# Patient Record
Sex: MALE | Race: WHITE | Employment: OTHER
[De-identification: names, ages, dates, MRNs, and addresses within clinical notes are randomized per-mention and may not be internally consistent; named-entity substitution may affect disease eponyms.]

---

## 2017-01-03 DIAGNOSIS — E13.9 DIABETES 1.5, MANAGED AS TYPE 2 (HCC): Primary | ICD-10-CM

## 2017-01-03 RX ORDER — PEN NEEDLE, DIABETIC 31 GX5/16"
NEEDLE, DISPOSABLE MISCELLANEOUS
Qty: 100 EACH | Refills: 6 | Status: SHIPPED | OUTPATIENT
Start: 2017-01-03 | End: 2017-05-15 | Stop reason: SDUPTHER

## 2017-01-23 PROBLEM — C44.319 BASAL CELL CARCINOMA OF CHEEK: Status: ACTIVE | Noted: 2017-01-23

## 2017-01-23 PROBLEM — D48.5 NEOPLASM OF UNCERTAIN BEHAVIOR OF SKIN: Status: ACTIVE | Noted: 2017-01-23

## 2017-01-24 ENCOUNTER — OFFICE VISIT (OUTPATIENT)
Dept: UROLOGY | Facility: CLINIC | Age: 77
End: 2017-01-24

## 2017-01-24 VITALS
BODY MASS INDEX: 31.36 KG/M2 | DIASTOLIC BLOOD PRESSURE: 50 MMHG | TEMPERATURE: 97.8 F | HEART RATE: 92 BPM | HEIGHT: 66 IN | WEIGHT: 195.11 LBS | SYSTOLIC BLOOD PRESSURE: 83 MMHG

## 2017-01-24 DIAGNOSIS — C67.9 MALIGNANT NEOPLASM OF URINARY BLADDER, UNSPECIFIED SITE (HCC): ICD-10-CM

## 2017-01-24 DIAGNOSIS — N13.8 BPH WITH OBSTRUCTION/LOWER URINARY TRACT SYMPTOMS: ICD-10-CM

## 2017-01-24 DIAGNOSIS — R33.9 INCOMPLETE EMPTYING OF BLADDER: Primary | ICD-10-CM

## 2017-01-24 DIAGNOSIS — N40.1 BPH WITH OBSTRUCTION/LOWER URINARY TRACT SYMPTOMS: ICD-10-CM

## 2017-01-24 PROCEDURE — 99214 OFFICE O/P EST MOD 30 MIN: CPT | Performed by: UROLOGY

## 2017-01-24 PROCEDURE — 1036F TOBACCO NON-USER: CPT | Performed by: UROLOGY

## 2017-01-24 PROCEDURE — G8427 DOCREV CUR MEDS BY ELIG CLIN: HCPCS | Performed by: UROLOGY

## 2017-01-24 PROCEDURE — G8484 FLU IMMUNIZE NO ADMIN: HCPCS | Performed by: UROLOGY

## 2017-01-24 PROCEDURE — 51798 US URINE CAPACITY MEASURE: CPT | Performed by: UROLOGY

## 2017-01-24 PROCEDURE — 4040F PNEUMOC VAC/ADMIN/RCVD: CPT | Performed by: UROLOGY

## 2017-01-24 PROCEDURE — G8419 CALC BMI OUT NRM PARAM NOF/U: HCPCS | Performed by: UROLOGY

## 2017-01-24 PROCEDURE — 1123F ACP DISCUSS/DSCN MKR DOCD: CPT | Performed by: UROLOGY

## 2017-01-24 ASSESSMENT — ENCOUNTER SYMPTOMS
EYE PAIN: 0
WHEEZING: 1
BACK PAIN: 1
COUGH: 1
SHORTNESS OF BREATH: 1
VOMITING: 0
COLOR CHANGE: 0
ABDOMINAL PAIN: 1
EYE REDNESS: 0
NAUSEA: 1

## 2017-02-06 ENCOUNTER — HOSPITAL ENCOUNTER (OUTPATIENT)
Age: 77
Discharge: HOME | End: 2017-02-06
Payer: MEDICARE

## 2017-02-06 ENCOUNTER — OFFICE VISIT (OUTPATIENT)
Dept: FAMILY MEDICINE CLINIC | Facility: CLINIC | Age: 77
End: 2017-02-06

## 2017-02-06 VITALS
OXYGEN SATURATION: 82 % | DIASTOLIC BLOOD PRESSURE: 78 MMHG | WEIGHT: 197.8 LBS | SYSTOLIC BLOOD PRESSURE: 113 MMHG | BODY MASS INDEX: 31.79 KG/M2 | HEIGHT: 66 IN | TEMPERATURE: 97.9 F | HEART RATE: 97 BPM

## 2017-02-06 DIAGNOSIS — E13.9 DIABETES 1.5, MANAGED AS TYPE 2 (HCC): ICD-10-CM

## 2017-02-06 DIAGNOSIS — R13.12 OROPHARYNGEAL DYSPHAGIA: Primary | ICD-10-CM

## 2017-02-06 DIAGNOSIS — J41.8 MIXED SIMPLE AND MUCOPURULENT CHRONIC BRONCHITIS (HCC): ICD-10-CM

## 2017-02-06 PROCEDURE — G8484 FLU IMMUNIZE NO ADMIN: HCPCS | Performed by: FAMILY MEDICINE

## 2017-02-06 PROCEDURE — 1036F TOBACCO NON-USER: CPT | Performed by: FAMILY MEDICINE

## 2017-02-06 PROCEDURE — G8926 SPIRO NO PERF OR DOC: HCPCS | Performed by: FAMILY MEDICINE

## 2017-02-06 PROCEDURE — 99213 OFFICE O/P EST LOW 20 MIN: CPT | Performed by: FAMILY MEDICINE

## 2017-02-06 PROCEDURE — 1123F ACP DISCUSS/DSCN MKR DOCD: CPT | Performed by: FAMILY MEDICINE

## 2017-02-06 PROCEDURE — 4040F PNEUMOC VAC/ADMIN/RCVD: CPT | Performed by: FAMILY MEDICINE

## 2017-02-06 PROCEDURE — G8427 DOCREV CUR MEDS BY ELIG CLIN: HCPCS | Performed by: FAMILY MEDICINE

## 2017-02-06 PROCEDURE — G8419 CALC BMI OUT NRM PARAM NOF/U: HCPCS | Performed by: FAMILY MEDICINE

## 2017-02-06 PROCEDURE — 3023F SPIROM DOC REV: CPT | Performed by: FAMILY MEDICINE

## 2017-02-06 ASSESSMENT — ENCOUNTER SYMPTOMS
VOMITING: 0
SORE THROAT: 0
ABDOMINAL PAIN: 0
CHEST TIGHTNESS: 0
COUGH: 1
BLOOD IN STOOL: 0
CHANGE IN BOWEL HABIT: 0
NAUSEA: 0
SHORTNESS OF BREATH: 0

## 2017-02-13 ENCOUNTER — HOSPITAL ENCOUNTER (OUTPATIENT)
Dept: GENERAL RADIOLOGY | Age: 77
Discharge: HOME | End: 2017-02-13
Payer: MEDICARE

## 2017-02-13 DIAGNOSIS — R13.12 OROPHARYNGEAL DYSPHAGIA: ICD-10-CM

## 2017-02-13 PROCEDURE — G8996 SWALLOW CURRENT STATUS: HCPCS

## 2017-02-13 PROCEDURE — 74230 X-RAY XM SWLNG FUNCJ C+: CPT | Performed by: RADIOLOGY

## 2017-02-13 PROCEDURE — G9500 RAD EXPOS IND/EXP TM DOC: HCPCS | Performed by: RADIOLOGY

## 2017-02-13 PROCEDURE — 74230 X-RAY XM SWLNG FUNCJ C+: CPT

## 2017-02-13 PROCEDURE — G8998 SWALLOW D/C STATUS: HCPCS

## 2017-02-14 ENCOUNTER — TELEPHONE (OUTPATIENT)
Dept: FAMILY MEDICINE CLINIC | Facility: CLINIC | Age: 77
End: 2017-02-14

## 2017-02-14 DIAGNOSIS — E13.9 DIABETES 1.5, MANAGED AS TYPE 2 (HCC): ICD-10-CM

## 2017-02-14 RX ORDER — INSULIN GLARGINE 100 [IU]/ML
22 INJECTION, SOLUTION SUBCUTANEOUS 2 TIMES DAILY
Qty: 5 PEN | Refills: 5 | Status: SHIPPED | OUTPATIENT
Start: 2017-02-14 | End: 2017-05-15

## 2017-03-03 ENCOUNTER — PROCEDURE VISIT (OUTPATIENT)
Dept: UROLOGY | Facility: CLINIC | Age: 77
End: 2017-03-03

## 2017-03-03 ENCOUNTER — HOSPITAL ENCOUNTER (OUTPATIENT)
Age: 77
Setting detail: SPECIMEN
Discharge: HOME | End: 2017-03-03
Payer: MEDICARE

## 2017-03-03 VITALS
WEIGHT: 197.75 LBS | BODY MASS INDEX: 31.78 KG/M2 | DIASTOLIC BLOOD PRESSURE: 42 MMHG | TEMPERATURE: 97.6 F | HEIGHT: 66 IN | HEART RATE: 65 BPM | RESPIRATION RATE: 16 BRPM | SYSTOLIC BLOOD PRESSURE: 99 MMHG

## 2017-03-03 DIAGNOSIS — N13.8 BPH WITH OBSTRUCTION/LOWER URINARY TRACT SYMPTOMS: ICD-10-CM

## 2017-03-03 DIAGNOSIS — C67.9 MALIGNANT NEOPLASM OF URINARY BLADDER, UNSPECIFIED SITE (HCC): Primary | ICD-10-CM

## 2017-03-03 DIAGNOSIS — N40.1 BPH WITH OBSTRUCTION/LOWER URINARY TRACT SYMPTOMS: ICD-10-CM

## 2017-03-03 PROCEDURE — 52000 CYSTOURETHROSCOPY: CPT | Performed by: UROLOGY

## 2017-03-03 PROCEDURE — 1036F TOBACCO NON-USER: CPT | Performed by: UROLOGY

## 2017-03-03 RX ORDER — FINASTERIDE 5 MG/1
5 TABLET, FILM COATED ORAL DAILY
Qty: 90 TABLET | Refills: 3 | Status: SHIPPED | OUTPATIENT
Start: 2017-03-03 | End: 2017-03-03 | Stop reason: CLARIF

## 2017-03-03 RX ORDER — FINASTERIDE 5 MG/1
5 TABLET, FILM COATED ORAL DAILY
Qty: 90 TABLET | Refills: 3 | Status: SHIPPED | OUTPATIENT
Start: 2017-03-03 | End: 2018-02-21 | Stop reason: SDUPTHER

## 2017-03-06 LAB
CASE NUMBER:: NORMAL
SPECIMEN DESCRIPTION: NORMAL
SURGICAL PATHOLOGY REPORT: NORMAL

## 2017-03-09 ENCOUNTER — TELEPHONE (OUTPATIENT)
Dept: UROLOGY | Facility: CLINIC | Age: 77
End: 2017-03-09

## 2017-03-24 ENCOUNTER — HOSPITAL ENCOUNTER (OUTPATIENT)
Dept: PREADMISSION TESTING | Age: 77
Discharge: HOME | End: 2017-03-24
Payer: MEDICARE

## 2017-03-24 ENCOUNTER — HOSPITAL ENCOUNTER (OUTPATIENT)
Dept: GENERAL RADIOLOGY | Age: 77
Discharge: HOME | End: 2017-03-24
Payer: MEDICARE

## 2017-03-24 VITALS
WEIGHT: 198 LBS | DIASTOLIC BLOOD PRESSURE: 63 MMHG | SYSTOLIC BLOOD PRESSURE: 106 MMHG | BODY MASS INDEX: 31.82 KG/M2 | RESPIRATION RATE: 16 BRPM | OXYGEN SATURATION: 90 % | HEIGHT: 66 IN | HEART RATE: 52 BPM | TEMPERATURE: 98 F

## 2017-03-24 LAB
ABSOLUTE EOS #: 0.1 K/UL (ref 0–0.4)
ABSOLUTE LYMPH #: 1.3 K/UL (ref 1–4.8)
ABSOLUTE MONO #: 0.5 K/UL (ref 0.1–1.3)
ANION GAP SERPL CALCULATED.3IONS-SCNC: 15 MMOL/L (ref 9–17)
BASOPHILS # BLD: 1 % (ref 0–2)
BASOPHILS ABSOLUTE: 0 K/UL (ref 0–0.2)
BUN BLDV-MCNC: 24 MG/DL (ref 8–23)
BUN/CREAT BLD: ABNORMAL (ref 9–20)
CALCIUM SERPL-MCNC: 9.7 MG/DL (ref 8.6–10.4)
CHLORIDE BLD-SCNC: 99 MMOL/L (ref 98–107)
CO2: 24 MMOL/L (ref 20–31)
CREAT SERPL-MCNC: 1.04 MG/DL (ref 0.7–1.2)
DIFFERENTIAL TYPE: ABNORMAL
EOSINOPHILS RELATIVE PERCENT: 2 % (ref 0–4)
GFR AFRICAN AMERICAN: >60 ML/MIN
GFR NON-AFRICAN AMERICAN: >60 ML/MIN
GFR SERPL CREATININE-BSD FRML MDRD: ABNORMAL ML/MIN/{1.73_M2}
GFR SERPL CREATININE-BSD FRML MDRD: ABNORMAL ML/MIN/{1.73_M2}
GLUCOSE BLD-MCNC: 390 MG/DL (ref 70–99)
HCT VFR BLD CALC: 45.5 % (ref 41–53)
HEMOGLOBIN: 15.4 G/DL (ref 13.5–17.5)
LYMPHOCYTES # BLD: 20 % (ref 24–44)
MCH RBC QN AUTO: 34.4 PG (ref 26–34)
MCHC RBC AUTO-ENTMCNC: 33.8 G/DL (ref 31–37)
MCV RBC AUTO: 101.9 FL (ref 80–100)
MONOCYTES # BLD: 7 % (ref 1–7)
PDW BLD-RTO: 13.6 % (ref 11.5–14.9)
PLATELET # BLD: 98 K/UL (ref 150–450)
PLATELET ESTIMATE: ABNORMAL
PMV BLD AUTO: 10 FL (ref 6–12)
POTASSIUM SERPL-SCNC: 4.7 MMOL/L (ref 3.7–5.3)
RBC # BLD: 4.47 M/UL (ref 4.5–5.9)
RBC # BLD: ABNORMAL 10*6/UL
SEG NEUTROPHILS: 70 % (ref 36–66)
SEGMENTED NEUTROPHILS ABSOLUTE COUNT: 4.8 K/UL (ref 1.3–9.1)
SODIUM BLD-SCNC: 138 MMOL/L (ref 135–144)
WBC # BLD: 6.8 K/UL (ref 3.5–11)
WBC # BLD: ABNORMAL 10*3/UL

## 2017-03-24 PROCEDURE — 80048 BASIC METABOLIC PNL TOTAL CA: CPT

## 2017-03-24 PROCEDURE — 85025 COMPLETE CBC W/AUTO DIFF WBC: CPT

## 2017-03-24 PROCEDURE — 71020 XR CHEST STANDARD TWO VW: CPT

## 2017-03-24 PROCEDURE — 36415 COLL VENOUS BLD VENIPUNCTURE: CPT

## 2017-03-24 PROCEDURE — 87086 URINE CULTURE/COLONY COUNT: CPT

## 2017-03-25 LAB
CULTURE: NORMAL
CULTURE: NORMAL
Lab: NORMAL
SPECIMEN DESCRIPTION: NORMAL
SPECIMEN DESCRIPTION: NORMAL
STATUS: NORMAL

## 2017-03-25 RX ORDER — SODIUM CHLORIDE 9 MG/ML
INJECTION, SOLUTION INTRAVENOUS CONTINUOUS
Status: CANCELLED | OUTPATIENT
Start: 2017-03-25

## 2017-03-25 RX ORDER — SODIUM CHLORIDE 0.9 % (FLUSH) 0.9 %
10 SYRINGE (ML) INJECTION EVERY 12 HOURS SCHEDULED
Status: CANCELLED | OUTPATIENT
Start: 2017-03-25

## 2017-03-25 RX ORDER — SODIUM CHLORIDE 0.9 % (FLUSH) 0.9 %
10 SYRINGE (ML) INJECTION PRN
Status: CANCELLED | OUTPATIENT
Start: 2017-03-25

## 2017-03-25 RX ORDER — LIDOCAINE HYDROCHLORIDE 10 MG/ML
1 INJECTION, SOLUTION EPIDURAL; INFILTRATION; INTRACAUDAL; PERINEURAL
Status: CANCELLED | OUTPATIENT
Start: 2017-03-25 | End: 2017-03-25

## 2017-03-29 ENCOUNTER — TELEPHONE (OUTPATIENT)
Dept: UROLOGY | Age: 77
End: 2017-03-29

## 2017-03-29 ENCOUNTER — TELEPHONE (OUTPATIENT)
Dept: PULMONOLOGY | Age: 77
End: 2017-03-29

## 2017-04-07 ENCOUNTER — CLINICAL DOCUMENTATION (OUTPATIENT)
Dept: UROLOGY | Age: 77
End: 2017-04-07

## 2017-04-10 ENCOUNTER — OFFICE VISIT (OUTPATIENT)
Dept: PULMONOLOGY | Age: 77
End: 2017-04-10
Payer: MEDICARE

## 2017-04-10 VITALS
HEIGHT: 67 IN | BODY MASS INDEX: 31.23 KG/M2 | OXYGEN SATURATION: 94 % | BODY MASS INDEX: 31.23 KG/M2 | HEART RATE: 86 BPM | OXYGEN SATURATION: 94 % | TEMPERATURE: 97.2 F | WEIGHT: 199 LBS | HEIGHT: 67 IN | HEART RATE: 83 BPM | WEIGHT: 199 LBS | RESPIRATION RATE: 16 BRPM | DIASTOLIC BLOOD PRESSURE: 59 MMHG | SYSTOLIC BLOOD PRESSURE: 101 MMHG

## 2017-04-10 DIAGNOSIS — J96.11 CHRONIC RESPIRATORY FAILURE WITH HYPOXIA (HCC): ICD-10-CM

## 2017-04-10 DIAGNOSIS — J44.9 COPD, MODERATE (HCC): Primary | ICD-10-CM

## 2017-04-10 DIAGNOSIS — R06.00 DYSPNEA, UNSPECIFIED TYPE: ICD-10-CM

## 2017-04-10 DIAGNOSIS — Z01.818 PREOP TESTING: ICD-10-CM

## 2017-04-10 DIAGNOSIS — Z87.891 PERSONAL HISTORY OF TOBACCO USE: ICD-10-CM

## 2017-04-10 PROCEDURE — G8427 DOCREV CUR MEDS BY ELIG CLIN: HCPCS | Performed by: INTERNAL MEDICINE

## 2017-04-10 PROCEDURE — G8417 CALC BMI ABV UP PARAM F/U: HCPCS | Performed by: INTERNAL MEDICINE

## 2017-04-10 PROCEDURE — G8926 SPIRO NO PERF OR DOC: HCPCS | Performed by: INTERNAL MEDICINE

## 2017-04-10 PROCEDURE — 94375 RESPIRATORY FLOW VOLUME LOOP: CPT | Performed by: INTERNAL MEDICINE

## 2017-04-10 PROCEDURE — 1036F TOBACCO NON-USER: CPT | Performed by: INTERNAL MEDICINE

## 2017-04-10 PROCEDURE — 94729 DIFFUSING CAPACITY: CPT | Performed by: INTERNAL MEDICINE

## 2017-04-10 PROCEDURE — 4040F PNEUMOC VAC/ADMIN/RCVD: CPT | Performed by: INTERNAL MEDICINE

## 2017-04-10 PROCEDURE — 1123F ACP DISCUSS/DSCN MKR DOCD: CPT | Performed by: INTERNAL MEDICINE

## 2017-04-10 PROCEDURE — 99213 OFFICE O/P EST LOW 20 MIN: CPT | Performed by: INTERNAL MEDICINE

## 2017-04-10 PROCEDURE — 3023F SPIROM DOC REV: CPT | Performed by: INTERNAL MEDICINE

## 2017-04-10 RX ORDER — ROPINIROLE 2 MG/1
TABLET, FILM COATED ORAL
Refills: 3 | COMMUNITY
Start: 2017-03-31 | End: 2017-12-11 | Stop reason: SDUPTHER

## 2017-04-10 ASSESSMENT — ENCOUNTER SYMPTOMS
SHORTNESS OF BREATH: 1
COUGH: 1
EYES NEGATIVE: 1

## 2017-04-24 RX ORDER — LOVASTATIN 40 MG/1
40 TABLET ORAL NIGHTLY
Qty: 90 TABLET | Refills: 1 | Status: SHIPPED | OUTPATIENT
Start: 2017-04-24 | End: 2017-10-20 | Stop reason: SDUPTHER

## 2017-04-28 ENCOUNTER — ANESTHESIA (OUTPATIENT)
Dept: OPERATING ROOM | Age: 77
End: 2017-04-28
Payer: MEDICARE

## 2017-04-28 ENCOUNTER — ANESTHESIA EVENT (OUTPATIENT)
Dept: OPERATING ROOM | Age: 77
End: 2017-04-28
Payer: MEDICARE

## 2017-04-28 ENCOUNTER — HOSPITAL ENCOUNTER (OUTPATIENT)
Age: 77
Setting detail: OUTPATIENT SURGERY
Discharge: HOME | End: 2017-04-28
Attending: UROLOGY | Admitting: UROLOGY
Payer: MEDICARE

## 2017-04-28 VITALS
HEART RATE: 85 BPM | WEIGHT: 199 LBS | SYSTOLIC BLOOD PRESSURE: 135 MMHG | HEIGHT: 69 IN | OXYGEN SATURATION: 92 % | TEMPERATURE: 97.4 F | BODY MASS INDEX: 29.47 KG/M2 | RESPIRATION RATE: 18 BRPM | DIASTOLIC BLOOD PRESSURE: 51 MMHG

## 2017-04-28 VITALS — SYSTOLIC BLOOD PRESSURE: 88 MMHG | DIASTOLIC BLOOD PRESSURE: 63 MMHG | OXYGEN SATURATION: 98 %

## 2017-04-28 LAB — GLUCOSE BLD-MCNC: 204 MG/DL (ref 75–110)

## 2017-04-28 PROCEDURE — 7100000031 HC ASPR PHASE II RECOVERY - ADDTL 15 MIN: Performed by: UROLOGY

## 2017-04-28 PROCEDURE — 88305 TISSUE EXAM BY PATHOLOGIST: CPT

## 2017-04-28 PROCEDURE — 7100000001 HC PACU RECOVERY - ADDTL 15 MIN: Performed by: UROLOGY

## 2017-04-28 PROCEDURE — 3700000001 HC ADD 15 MINUTES (ANESTHESIA): Performed by: UROLOGY

## 2017-04-28 PROCEDURE — 7100000030 HC ASPR PHASE II RECOVERY - FIRST 15 MIN: Performed by: UROLOGY

## 2017-04-28 PROCEDURE — 3600000012 HC SURGERY LEVEL 2 ADDTL 15MIN: Performed by: UROLOGY

## 2017-04-28 PROCEDURE — 2580000003 HC RX 258: Performed by: NURSE ANESTHETIST, CERTIFIED REGISTERED

## 2017-04-28 PROCEDURE — 6360000002 HC RX W HCPCS: Performed by: NURSE ANESTHETIST, CERTIFIED REGISTERED

## 2017-04-28 PROCEDURE — 7100000000 HC PACU RECOVERY - FIRST 15 MIN: Performed by: UROLOGY

## 2017-04-28 PROCEDURE — 2500000003 HC RX 250 WO HCPCS: Performed by: NURSE ANESTHETIST, CERTIFIED REGISTERED

## 2017-04-28 PROCEDURE — 82947 ASSAY GLUCOSE BLOOD QUANT: CPT

## 2017-04-28 PROCEDURE — 6360000002 HC RX W HCPCS: Performed by: UROLOGY

## 2017-04-28 PROCEDURE — 2580000003 HC RX 258: Performed by: UROLOGY

## 2017-04-28 PROCEDURE — 3600000002 HC SURGERY LEVEL 2 BASE: Performed by: UROLOGY

## 2017-04-28 PROCEDURE — 3700000000 HC ANESTHESIA ATTENDED CARE: Performed by: UROLOGY

## 2017-04-28 RX ORDER — EPHEDRINE SULFATE 50 MG/ML
INJECTION, SOLUTION INTRAVENOUS PRN
Status: DISCONTINUED | OUTPATIENT
Start: 2017-04-28 | End: 2017-04-28 | Stop reason: SDUPTHER

## 2017-04-28 RX ORDER — FENTANYL CITRATE 50 UG/ML
50 INJECTION, SOLUTION INTRAMUSCULAR; INTRAVENOUS EVERY 5 MIN PRN
Status: DISCONTINUED | OUTPATIENT
Start: 2017-04-28 | End: 2017-04-28 | Stop reason: HOSPADM

## 2017-04-28 RX ORDER — FENTANYL CITRATE 50 UG/ML
25 INJECTION, SOLUTION INTRAMUSCULAR; INTRAVENOUS EVERY 5 MIN PRN
Status: DISCONTINUED | OUTPATIENT
Start: 2017-04-28 | End: 2017-04-28 | Stop reason: HOSPADM

## 2017-04-28 RX ORDER — DIPHENHYDRAMINE HYDROCHLORIDE 50 MG/ML
12.5 INJECTION INTRAMUSCULAR; INTRAVENOUS
Status: DISCONTINUED | OUTPATIENT
Start: 2017-04-28 | End: 2017-04-28 | Stop reason: HOSPADM

## 2017-04-28 RX ORDER — MEPERIDINE HYDROCHLORIDE 25 MG/ML
12.5 INJECTION INTRAMUSCULAR; INTRAVENOUS; SUBCUTANEOUS EVERY 5 MIN PRN
Status: DISCONTINUED | OUTPATIENT
Start: 2017-04-28 | End: 2017-04-28 | Stop reason: HOSPADM

## 2017-04-28 RX ORDER — SODIUM CHLORIDE 9 MG/ML
INJECTION, SOLUTION INTRAVENOUS CONTINUOUS
Status: DISCONTINUED | OUTPATIENT
Start: 2017-04-28 | End: 2017-04-28 | Stop reason: HOSPADM

## 2017-04-28 RX ORDER — LABETALOL HYDROCHLORIDE 5 MG/ML
5 INJECTION, SOLUTION INTRAVENOUS EVERY 10 MIN PRN
Status: DISCONTINUED | OUTPATIENT
Start: 2017-04-28 | End: 2017-04-28 | Stop reason: HOSPADM

## 2017-04-28 RX ORDER — MIDAZOLAM HYDROCHLORIDE 1 MG/ML
INJECTION INTRAMUSCULAR; INTRAVENOUS PRN
Status: DISCONTINUED | OUTPATIENT
Start: 2017-04-28 | End: 2017-04-28 | Stop reason: SDUPTHER

## 2017-04-28 RX ORDER — HYDRALAZINE HYDROCHLORIDE 20 MG/ML
5 INJECTION INTRAMUSCULAR; INTRAVENOUS EVERY 10 MIN PRN
Status: DISCONTINUED | OUTPATIENT
Start: 2017-04-28 | End: 2017-04-28 | Stop reason: HOSPADM

## 2017-04-28 RX ORDER — MORPHINE SULFATE 2 MG/ML
1 INJECTION, SOLUTION INTRAMUSCULAR; INTRAVENOUS EVERY 5 MIN PRN
Status: DISCONTINUED | OUTPATIENT
Start: 2017-04-28 | End: 2017-04-28 | Stop reason: HOSPADM

## 2017-04-28 RX ORDER — ONDANSETRON 2 MG/ML
4 INJECTION INTRAMUSCULAR; INTRAVENOUS
Status: DISCONTINUED | OUTPATIENT
Start: 2017-04-28 | End: 2017-04-28 | Stop reason: HOSPADM

## 2017-04-28 RX ORDER — PROPOFOL 10 MG/ML
INJECTION, EMULSION INTRAVENOUS PRN
Status: DISCONTINUED | OUTPATIENT
Start: 2017-04-28 | End: 2017-04-28 | Stop reason: SDUPTHER

## 2017-04-28 RX ORDER — LIDOCAINE HYDROCHLORIDE 10 MG/ML
INJECTION, SOLUTION EPIDURAL; INFILTRATION; INTRACAUDAL; PERINEURAL PRN
Status: DISCONTINUED | OUTPATIENT
Start: 2017-04-28 | End: 2017-04-28 | Stop reason: SDUPTHER

## 2017-04-28 RX ORDER — METOCLOPRAMIDE HYDROCHLORIDE 5 MG/ML
10 INJECTION INTRAMUSCULAR; INTRAVENOUS
Status: DISCONTINUED | OUTPATIENT
Start: 2017-04-28 | End: 2017-04-28 | Stop reason: HOSPADM

## 2017-04-28 RX ORDER — FENTANYL CITRATE 50 UG/ML
INJECTION, SOLUTION INTRAMUSCULAR; INTRAVENOUS PRN
Status: DISCONTINUED | OUTPATIENT
Start: 2017-04-28 | End: 2017-04-28 | Stop reason: SDUPTHER

## 2017-04-28 RX ORDER — ONDANSETRON 2 MG/ML
INJECTION INTRAMUSCULAR; INTRAVENOUS PRN
Status: DISCONTINUED | OUTPATIENT
Start: 2017-04-28 | End: 2017-04-28 | Stop reason: SDUPTHER

## 2017-04-28 RX ORDER — SODIUM CHLORIDE, SODIUM LACTATE, POTASSIUM CHLORIDE, CALCIUM CHLORIDE 600; 310; 30; 20 MG/100ML; MG/100ML; MG/100ML; MG/100ML
INJECTION, SOLUTION INTRAVENOUS CONTINUOUS PRN
Status: DISCONTINUED | OUTPATIENT
Start: 2017-04-28 | End: 2017-04-28 | Stop reason: SDUPTHER

## 2017-04-28 RX ADMIN — EPHEDRINE SULFATE 10 MG: 50 INJECTION INTRAMUSCULAR; INTRAVENOUS; SUBCUTANEOUS at 07:38

## 2017-04-28 RX ADMIN — EPHEDRINE SULFATE 5 MG: 50 INJECTION INTRAMUSCULAR; INTRAVENOUS; SUBCUTANEOUS at 07:36

## 2017-04-28 RX ADMIN — PROPOFOL 150 MG: 10 INJECTION, EMULSION INTRAVENOUS at 07:25

## 2017-04-28 RX ADMIN — FENTANYL CITRATE 50 MCG: 50 INJECTION INTRAMUSCULAR; INTRAVENOUS at 07:32

## 2017-04-28 RX ADMIN — MIDAZOLAM HYDROCHLORIDE 2 MG: 1 INJECTION, SOLUTION INTRAMUSCULAR; INTRAVENOUS at 07:25

## 2017-04-28 RX ADMIN — EPHEDRINE SULFATE 5 MG: 50 INJECTION INTRAMUSCULAR; INTRAVENOUS; SUBCUTANEOUS at 07:42

## 2017-04-28 RX ADMIN — LIDOCAINE HYDROCHLORIDE 50 MG: 10 INJECTION, SOLUTION EPIDURAL; INFILTRATION; INTRACAUDAL; PERINEURAL at 07:25

## 2017-04-28 RX ADMIN — SODIUM CHLORIDE, POTASSIUM CHLORIDE, SODIUM LACTATE AND CALCIUM CHLORIDE: 600; 310; 30; 20 INJECTION, SOLUTION INTRAVENOUS at 07:20

## 2017-04-28 RX ADMIN — SODIUM CHLORIDE: 9 INJECTION, SOLUTION INTRAVENOUS at 07:09

## 2017-04-28 RX ADMIN — Medication 2 G: at 07:22

## 2017-04-28 RX ADMIN — ONDANSETRON 4 MG: 2 INJECTION, SOLUTION INTRAMUSCULAR; INTRAVENOUS at 07:34

## 2017-04-28 ASSESSMENT — PAIN SCALES - GENERAL
PAINLEVEL_OUTOF10: 0

## 2017-04-28 ASSESSMENT — COPD QUESTIONNAIRES: CAT_SEVERITY: SEVERE

## 2017-04-28 ASSESSMENT — PAIN - FUNCTIONAL ASSESSMENT: PAIN_FUNCTIONAL_ASSESSMENT: 0-10

## 2017-05-01 LAB — SURGICAL PATHOLOGY REPORT: NORMAL

## 2017-05-07 RX ORDER — CITALOPRAM 20 MG/1
20 TABLET ORAL EVERY MORNING
Qty: 90 TABLET | Refills: 1 | Status: SHIPPED | OUTPATIENT
Start: 2017-05-07 | End: 2017-10-28 | Stop reason: SDUPTHER

## 2017-05-12 ENCOUNTER — OFFICE VISIT (OUTPATIENT)
Dept: UROLOGY | Age: 77
End: 2017-05-12
Payer: MEDICARE

## 2017-05-12 VITALS
WEIGHT: 200.62 LBS | BODY MASS INDEX: 29.71 KG/M2 | HEIGHT: 69 IN | HEART RATE: 92 BPM | TEMPERATURE: 97.7 F | SYSTOLIC BLOOD PRESSURE: 98 MMHG | DIASTOLIC BLOOD PRESSURE: 60 MMHG

## 2017-05-12 DIAGNOSIS — R39.15 URINARY URGENCY: ICD-10-CM

## 2017-05-12 DIAGNOSIS — C67.2 MALIGNANT NEOPLASM OF LATERAL WALL OF URINARY BLADDER (HCC): Primary | ICD-10-CM

## 2017-05-12 DIAGNOSIS — N40.1 BPH WITH OBSTRUCTION/LOWER URINARY TRACT SYMPTOMS: ICD-10-CM

## 2017-05-12 DIAGNOSIS — N13.8 BPH WITH OBSTRUCTION/LOWER URINARY TRACT SYMPTOMS: ICD-10-CM

## 2017-05-12 PROCEDURE — 1036F TOBACCO NON-USER: CPT | Performed by: UROLOGY

## 2017-05-12 PROCEDURE — 99214 OFFICE O/P EST MOD 30 MIN: CPT | Performed by: UROLOGY

## 2017-05-12 PROCEDURE — 4040F PNEUMOC VAC/ADMIN/RCVD: CPT | Performed by: UROLOGY

## 2017-05-12 PROCEDURE — G8420 CALC BMI NORM PARAMETERS: HCPCS | Performed by: UROLOGY

## 2017-05-12 PROCEDURE — G8427 DOCREV CUR MEDS BY ELIG CLIN: HCPCS | Performed by: UROLOGY

## 2017-05-12 PROCEDURE — 1123F ACP DISCUSS/DSCN MKR DOCD: CPT | Performed by: UROLOGY

## 2017-05-15 ENCOUNTER — OFFICE VISIT (OUTPATIENT)
Dept: FAMILY MEDICINE CLINIC | Age: 77
End: 2017-05-15
Payer: MEDICARE

## 2017-05-15 VITALS
DIASTOLIC BLOOD PRESSURE: 57 MMHG | HEART RATE: 74 BPM | BODY MASS INDEX: 29.92 KG/M2 | TEMPERATURE: 97.4 F | OXYGEN SATURATION: 90 % | SYSTOLIC BLOOD PRESSURE: 93 MMHG | HEIGHT: 69 IN | WEIGHT: 202 LBS

## 2017-05-15 DIAGNOSIS — E13.9 DIABETES 1.5, MANAGED AS TYPE 2 (HCC): Primary | ICD-10-CM

## 2017-05-15 DIAGNOSIS — J41.8 MIXED SIMPLE AND MUCOPURULENT CHRONIC BRONCHITIS (HCC): ICD-10-CM

## 2017-05-15 DIAGNOSIS — I10 ESSENTIAL HYPERTENSION: ICD-10-CM

## 2017-05-15 PROCEDURE — 1123F ACP DISCUSS/DSCN MKR DOCD: CPT | Performed by: FAMILY MEDICINE

## 2017-05-15 PROCEDURE — 4040F PNEUMOC VAC/ADMIN/RCVD: CPT | Performed by: FAMILY MEDICINE

## 2017-05-15 PROCEDURE — G8926 SPIRO NO PERF OR DOC: HCPCS | Performed by: FAMILY MEDICINE

## 2017-05-15 PROCEDURE — 99213 OFFICE O/P EST LOW 20 MIN: CPT | Performed by: FAMILY MEDICINE

## 2017-05-15 PROCEDURE — 1036F TOBACCO NON-USER: CPT | Performed by: FAMILY MEDICINE

## 2017-05-15 PROCEDURE — G8420 CALC BMI NORM PARAMETERS: HCPCS | Performed by: FAMILY MEDICINE

## 2017-05-15 PROCEDURE — G8427 DOCREV CUR MEDS BY ELIG CLIN: HCPCS | Performed by: FAMILY MEDICINE

## 2017-05-15 PROCEDURE — 99213 OFFICE O/P EST LOW 20 MIN: CPT

## 2017-05-15 PROCEDURE — 3023F SPIROM DOC REV: CPT | Performed by: FAMILY MEDICINE

## 2017-05-15 RX ORDER — QUINAPRIL 40 MG/1
40 TABLET ORAL DAILY
Qty: 90 TABLET | Refills: 3 | Status: SHIPPED | OUTPATIENT
Start: 2017-05-15 | End: 2017-11-20 | Stop reason: SDUPTHER

## 2017-05-15 RX ORDER — SITAGLIPTIN 100 MG/1
100 TABLET, FILM COATED ORAL DAILY
Qty: 30 TABLET | Status: CANCELLED | OUTPATIENT
Start: 2017-05-15

## 2017-05-15 ASSESSMENT — ENCOUNTER SYMPTOMS
SORE THROAT: 0
NAUSEA: 0
BLOOD IN STOOL: 0
CHEST TIGHTNESS: 0
VOMITING: 0
SHORTNESS OF BREATH: 0
ABDOMINAL PAIN: 0
COUGH: 0

## 2017-05-16 ENCOUNTER — HOSPITAL ENCOUNTER (OUTPATIENT)
Age: 77
Discharge: HOME | End: 2017-05-16
Payer: MEDICARE

## 2017-05-16 DIAGNOSIS — E13.9 DIABETES 1.5, MANAGED AS TYPE 2 (HCC): ICD-10-CM

## 2017-05-16 LAB
ANION GAP SERPL CALCULATED.3IONS-SCNC: 13 MMOL/L (ref 9–17)
BUN BLDV-MCNC: 18 MG/DL (ref 8–23)
BUN/CREAT BLD: ABNORMAL (ref 9–20)
CALCIUM SERPL-MCNC: 9.4 MG/DL (ref 8.6–10.4)
CHLORIDE BLD-SCNC: 100 MMOL/L (ref 98–107)
CO2: 28 MMOL/L (ref 20–31)
CREAT SERPL-MCNC: 0.93 MG/DL (ref 0.7–1.2)
ESTIMATED AVERAGE GLUCOSE: 272 MG/DL
GFR AFRICAN AMERICAN: >60 ML/MIN
GFR NON-AFRICAN AMERICAN: >60 ML/MIN
GFR SERPL CREATININE-BSD FRML MDRD: ABNORMAL ML/MIN/{1.73_M2}
GFR SERPL CREATININE-BSD FRML MDRD: ABNORMAL ML/MIN/{1.73_M2}
GLUCOSE BLD-MCNC: 257 MG/DL (ref 70–99)
HBA1C MFR BLD: 11.1 % (ref 4–6)
POTASSIUM SERPL-SCNC: 4.7 MMOL/L (ref 3.7–5.3)
SODIUM BLD-SCNC: 141 MMOL/L (ref 135–144)

## 2017-05-16 PROCEDURE — 36415 COLL VENOUS BLD VENIPUNCTURE: CPT

## 2017-05-16 PROCEDURE — 83036 HEMOGLOBIN GLYCOSYLATED A1C: CPT

## 2017-05-16 PROCEDURE — 80048 BASIC METABOLIC PNL TOTAL CA: CPT

## 2017-05-29 PROBLEM — C67.8 MALIGNANT NEOPLASM OF OVERLAPPING SITES OF BLADDER (HCC): Status: ACTIVE | Noted: 2017-05-29

## 2017-07-17 ENCOUNTER — TELEPHONE (OUTPATIENT)
Dept: PULMONOLOGY | Age: 77
End: 2017-07-17

## 2017-08-24 DIAGNOSIS — E13.9 DIABETES 1.5, MANAGED AS TYPE 2 (HCC): ICD-10-CM

## 2017-08-25 RX ORDER — INSULIN HUMAN 100 [IU]/ML
INJECTION, SUSPENSION SUBCUTANEOUS
Qty: 5 PEN | Refills: 3 | Status: SHIPPED | OUTPATIENT
Start: 2017-08-25 | End: 2017-11-20 | Stop reason: SDUPTHER

## 2017-09-05 ENCOUNTER — PROCEDURE VISIT (OUTPATIENT)
Dept: UROLOGY | Age: 77
End: 2017-09-05
Payer: MEDICARE

## 2017-09-05 ENCOUNTER — HOSPITAL ENCOUNTER (OUTPATIENT)
Age: 77
Setting detail: SPECIMEN
Discharge: HOME | End: 2017-09-05
Payer: MEDICARE

## 2017-09-05 VITALS — HEART RATE: 96 BPM | TEMPERATURE: 97.8 F | SYSTOLIC BLOOD PRESSURE: 123 MMHG | DIASTOLIC BLOOD PRESSURE: 81 MMHG

## 2017-09-05 DIAGNOSIS — C67.8 MALIGNANT NEOPLASM OF OVERLAPPING SITES OF BLADDER (HCC): ICD-10-CM

## 2017-09-05 DIAGNOSIS — C67.8 MALIGNANT NEOPLASM OF OVERLAPPING SITES OF BLADDER (HCC): Primary | ICD-10-CM

## 2017-09-05 PROCEDURE — 52000 CYSTOURETHROSCOPY: CPT | Performed by: UROLOGY

## 2017-09-05 PROCEDURE — 1036F TOBACCO NON-USER: CPT | Performed by: UROLOGY

## 2017-09-05 RX ORDER — AMOXICILLIN 500 MG/1
1 CAPSULE ORAL EVERY 8 HOURS
Refills: 0 | COMMUNITY
Start: 2017-08-30 | End: 2017-12-18 | Stop reason: ALTCHOICE

## 2017-09-12 LAB — UROTHELIAL CANCER DETECTION: NORMAL

## 2017-09-29 ENCOUNTER — OFFICE VISIT (OUTPATIENT)
Dept: FAMILY MEDICINE CLINIC | Age: 77
End: 2017-09-29
Payer: MEDICARE

## 2017-09-29 DIAGNOSIS — Z23 FLU VACCINE NEED: Primary | ICD-10-CM

## 2017-09-29 PROCEDURE — 90688 IIV4 VACCINE SPLT 0.5 ML IM: CPT | Performed by: FAMILY MEDICINE

## 2017-09-29 PROCEDURE — 4040F PNEUMOC VAC/ADMIN/RCVD: CPT | Performed by: FAMILY MEDICINE

## 2017-09-29 PROCEDURE — G8428 CUR MEDS NOT DOCUMENT: HCPCS | Performed by: FAMILY MEDICINE

## 2017-09-29 PROCEDURE — G0008 ADMIN INFLUENZA VIRUS VAC: HCPCS

## 2017-09-29 PROCEDURE — G8417 CALC BMI ABV UP PARAM F/U: HCPCS | Performed by: FAMILY MEDICINE

## 2017-10-16 ENCOUNTER — TELEPHONE (OUTPATIENT)
Dept: PULMONOLOGY | Age: 77
End: 2017-10-16

## 2017-10-16 NOTE — TELEPHONE ENCOUNTER
Telephone encounter from 7-17-17 regarding CT- Evette Henson talked to Oli Sosa.   I called and left message on her voice mail- I also re -sent referral/order via in-basket, asking her to please sched/let me know status

## 2017-10-30 NOTE — TELEPHONE ENCOUNTER
Medication is on med list please review and address    Please address the medication refill and close the encounter. If I can be of assistance, please route to the applicable pool. Thank you.   Next Visit Date:  Future Appointments  Date Time Provider Magan Wright   11/10/2017 8:45 AM [facility]   3/6/2018 9:30 AM Jasper Jackson MD [facility]   4/10/2018 9:30 AM John Granado DO Resp Spec [address] Maintenance   Topic Date Due    DTaP/Tdap/Td vaccine (1 - Tdap) 03/01/1957    Pneumococcal low/med risk (1 of 2 - PCV13) 03/01/2003    Lipid screen  07/12/2019    Zostavax vaccine  Addressed    Flu vaccine  Completed       Hemoglobin A1C (%)   Date Value   05/16/2017 11.1 (H)   11/14/2016 12.1   07/12/2014 12.6             ( goal A1C is < 7)   No results found for: LABMICR  LDL Calculated (mg/dL)   Date Value   07/12/2014 59       (goal LDL is <100)   BUN (mg/dL)   Date Value   05/16/2017 18     BP Readings from Last 3 Encounters:   09/05/17 123/81   05/15/17 (!) 93/57   05/12/17 98/60          (goal 120/80)    All Future Testing planned in [facility]  Lab Frequency Next Occurrence               Patient Active Problem List:     COPD (chronic obstructive pulmonary disease) (HCC)     Diabetes 1.5, managed as type 2 (HCC)     Restless legs syndrome     Hyperlipidemia     Hypertension     Depression     History of bladder cancer     BPH (benign prostatic hyperplasia)     Tobacco use     Neoplasm of uncertain behavior of skin     Basal cell carcinoma of cheek     Malignant neoplasm of overlapping sites of bladder ([facility] [address]

## 2017-10-31 RX ORDER — CITALOPRAM 20 MG/1
20 TABLET ORAL EVERY MORNING
Qty: 90 TABLET | Refills: 1 | Status: ON HOLD | OUTPATIENT
Start: 2017-10-31 | End: 2018-03-05 | Stop reason: HOSPADM

## 2017-10-31 RX ORDER — LOVASTATIN 40 MG/1
40 TABLET ORAL NIGHTLY
Qty: 90 TABLET | Refills: 1 | Status: SHIPPED | OUTPATIENT
Start: 2017-10-31 | End: 2018-04-29

## 2017-11-15 DIAGNOSIS — E13.9 DIABETES 1.5, MANAGED AS TYPE 2 (HCC): ICD-10-CM

## 2017-11-15 RX ORDER — PEN NEEDLE, DIABETIC 31 GX5/16"
NEEDLE, DISPOSABLE MISCELLANEOUS
Qty: 100 EACH | Refills: 2 | Status: SHIPPED | OUTPATIENT
Start: 2017-11-15

## 2017-11-15 NOTE — TELEPHONE ENCOUNTER
Please address the medication refill and close the encounter. If I can be of assistance, please route to the applicable pool. Thank you.   Next Visit Date:  Future Appointments  Date Time Provider Magan Wright   11/20/2017 2:15 PM [facility]   3/6/2018 9:30 AM Mamta Tellez MD [facility]   4/10/2018 9:30 AM Chasidy Pollard DO Resp Spec [address] Maintenance   Topic Date Due    DTaP/Tdap/Td vaccine (1 - Tdap) 03/01/1957    Pneumococcal low/med risk (1 of 2 - PCV13) 03/01/2003    Lipid screen  07/12/2019    Zostavax vaccine  Addressed    Flu vaccine  Completed       Hemoglobin A1C (%)   Date Value   05/16/2017 11.1 (H)   11/14/2016 12.1   07/12/2014 12.6             ( goal A1C is < 7)   No results found for: LABMICR  LDL Calculated (mg/dL)   Date Value   07/12/2014 59       (goal LDL is <100)   BUN (mg/dL)   Date Value   05/16/2017 18     BP Readings from Last 3 Encounters:   09/05/17 123/81   05/15/17 (!) 93/57   05/12/17 98/60          (goal 120/80)    All Future Testing planned in [facility]  Lab Frequency Next Occurrence   CT Lung Screening Once 04/10/2018               Patient Active Problem List:     COPD (chronic obstructive pulmonary disease) (HCC)     Diabetes 1.5, managed as type 2 (HCC)     Restless legs syndrome     Hyperlipidemia     Hypertension     Depression     History of bladder cancer     BPH (benign prostatic hyperplasia)     Tobacco use     Neoplasm of uncertain behavior of skin     Basal cell carcinoma of cheek     Malignant neoplasm of overlapping sites of bladder ([facility] [address]

## 2017-11-20 ENCOUNTER — OFFICE VISIT (OUTPATIENT)
Dept: FAMILY MEDICINE CLINIC | Age: 77
End: 2017-11-20
Payer: MEDICARE

## 2017-11-20 VITALS
DIASTOLIC BLOOD PRESSURE: 62 MMHG | TEMPERATURE: 96.7 F | HEIGHT: 69 IN | HEART RATE: 94 BPM | BODY MASS INDEX: 28.76 KG/M2 | SYSTOLIC BLOOD PRESSURE: 99 MMHG | WEIGHT: 194.2 LBS

## 2017-11-20 DIAGNOSIS — I10 ESSENTIAL HYPERTENSION: ICD-10-CM

## 2017-11-20 DIAGNOSIS — Z23 NEED FOR VACCINE FOR DT (DIPHTHERIA-TETANUS): ICD-10-CM

## 2017-11-20 DIAGNOSIS — Z23 NEED FOR PNEUMOCOCCAL VACCINATION: ICD-10-CM

## 2017-11-20 DIAGNOSIS — E03.8 OTHER SPECIFIED HYPOTHYROIDISM: ICD-10-CM

## 2017-11-20 DIAGNOSIS — E13.9 DIABETES 1.5, MANAGED AS TYPE 2 (HCC): ICD-10-CM

## 2017-11-20 DIAGNOSIS — R53.82 CHRONIC FATIGUE: Primary | ICD-10-CM

## 2017-11-20 PROCEDURE — 90471 IMMUNIZATION ADMIN: CPT | Performed by: FAMILY MEDICINE

## 2017-11-20 PROCEDURE — G0009 ADMIN PNEUMOCOCCAL VACCINE: HCPCS | Performed by: FAMILY MEDICINE

## 2017-11-20 PROCEDURE — G8484 FLU IMMUNIZE NO ADMIN: HCPCS | Performed by: FAMILY MEDICINE

## 2017-11-20 PROCEDURE — 99213 OFFICE O/P EST LOW 20 MIN: CPT | Performed by: FAMILY MEDICINE

## 2017-11-20 PROCEDURE — 99214 OFFICE O/P EST MOD 30 MIN: CPT

## 2017-11-20 PROCEDURE — 90715 TDAP VACCINE 7 YRS/> IM: CPT

## 2017-11-20 PROCEDURE — 1123F ACP DISCUSS/DSCN MKR DOCD: CPT | Performed by: FAMILY MEDICINE

## 2017-11-20 PROCEDURE — G8427 DOCREV CUR MEDS BY ELIG CLIN: HCPCS | Performed by: FAMILY MEDICINE

## 2017-11-20 PROCEDURE — 90670 PCV13 VACCINE IM: CPT

## 2017-11-20 PROCEDURE — 4040F PNEUMOC VAC/ADMIN/RCVD: CPT | Performed by: FAMILY MEDICINE

## 2017-11-20 PROCEDURE — 1036F TOBACCO NON-USER: CPT | Performed by: FAMILY MEDICINE

## 2017-11-20 PROCEDURE — G8417 CALC BMI ABV UP PARAM F/U: HCPCS | Performed by: FAMILY MEDICINE

## 2017-11-20 RX ORDER — QUINAPRIL 40 MG/1
40 TABLET ORAL DAILY
Qty: 90 TABLET | Refills: 3 | Status: ON HOLD | OUTPATIENT
Start: 2017-11-20 | End: 2018-03-15 | Stop reason: HOSPADM

## 2017-11-20 ASSESSMENT — PATIENT HEALTH QUESTIONNAIRE - PHQ9
SUM OF ALL RESPONSES TO PHQ9 QUESTIONS 1 & 2: 0
2. FEELING DOWN, DEPRESSED OR HOPELESS: 0
1. LITTLE INTEREST OR PLEASURE IN DOING THINGS: 0
SUM OF ALL RESPONSES TO PHQ QUESTIONS 1-9: 0

## 2017-11-20 ASSESSMENT — ENCOUNTER SYMPTOMS
COUGH: 0
SHORTNESS OF BREATH: 0
BACK PAIN: 0

## 2017-11-20 NOTE — PATIENT INSTRUCTIONS
Thank you for letting us take care of you today. We hope all your questions were addressed. If a question was overlooked or something else comes to mind after you return home, please contact a member of your Care Team listed below. Please make sure you have a routine office visit set up to follow-up on [address]. Your Care Team at [place] [address] is Team #2  Sarika Nieves DO (Faculty)  Delfin Garcia MD (Resident)  Ivelisse Swann MD (Resident)  Kirill Leonard MD (Resident)  Cuong Cartagena MD (Resident)  Nan Osborn MD (Resident)  RAZ Dimas., A  Thu Conde, TOM Abdi ([address])  Nancy Roberts RN, ([address] [facility])  Amee Castro, Ph.D., (Behavioral Services)  Navi Murillo, [facility] (Clinical Pharmacist)     Office phone number: [phone]    If you need to get in right away due to illness, please be advised we have \"Same Day\" appointments available Monday-Friday. Please call us at [phone] option #1 to schedule your \"Same Day\" appointment.

## 2017-11-20 NOTE — PROGRESS NOTES
Subjective:      Patient ID: Jake Og is a 76 y.o. male. Recent tooth pull - (5)  Plans to pull top teeth yet - 12-28    Need to check labs - DM control  Wife brought medication bottles - reviewed    Usual tiredness reported        Review of Systems   Constitutional: Positive for fatigue. Negative for activity change. Respiratory: Negative for cough and shortness of breath. Cardiovascular: Negative for chest pain and leg swelling. Genitourinary: Negative for dysuria. Musculoskeletal: Negative for back pain. Neurological: Negative for dizziness. Psychiatric/Behavioral: Negative for agitation, dysphoric mood and self-injury. Objective:   Physical Exam   Constitutional: He appears well-developed and well-nourished. HENT:   Head: Atraumatic. Eyes: Conjunctivae are normal.   Neck: Neck supple. No thyromegaly present. Cardiovascular: Normal rate and regular rhythm. Pulmonary/Chest: Effort normal and breath sounds normal.   No tobacco odor   Skin: Skin is warm and dry. Psychiatric: He has a normal mood and affect. His behavior is normal. Judgment and thought content normal.       Assessment:      1. Chronic fatigue  Hemoglobin A1C   2. Need for vaccine for DT (diphtheria-tetanus)  Tdap (age 6y and older) IM ([employer])   3. Need for pneumococcal vaccination  Pneumococcal conjugate vaccine 13-valent IM (PREVNAR 13)   4. Diabetes 1.5, [facility] as [facility]  Hemoglobin A1C    Microalbumin, Ur    Basic Metabolic Panel    TSH With Reflex Ft4    insulin NPH (HUMULIN N KWIKPEN) 100 UNIT/ML injection pen    quinapril (ACCUPRIL) 40 MG tablet   5. Other specified hypothyroidism  TSH With Reflex Ft4   6. Essential hypertension  quinapril (ACCUPRIL) 40 MG tablet           Plan:      Labs - ASAP  Medications - renewed - see orders  UTD - FluVax    Update TDaP and Pneumovax ()    The patient is asked to return in 3 months.

## 2017-11-20 NOTE — PROGRESS NOTES
Visit Information    Have you changed or started any medications since your last visit including any over-the-counter medicines, vitamins, or herbal medicines? no   Have you stopped taking any of your medications? Is so, why? -  no  Are you having any side effects from any of your medications? - no    Have you seen any other physician or provider since your last visit?  no   Have you had any other diagnostic tests since your last visit?  no   Have you been seen in the emergency room and/or had an admission in a hospital since we last saw you?  no   Have you had your routine dental cleaning in the past 6 months?  yes     Do you have an active MyChart account? If no, what is the barrier?   No: declined     Patient Care Team:  Alexia Abdi DO as PCP - General (Family Medicine)  Andres Toledo MD as Consulting Physician (Urology)  Brown Abbott DO as Consulting Physician (Pulmonology)  Libby Gunderson RN as     Medical History Review  Past Medical, Family, and Social History reviewed and does contribute to the patient presenting condition    Health Maintenance   Topic Date Due    DTaP/Tdap/Td vaccine (1 - Tdap) 03/01/1957    Pneumococcal low/med risk (1 of 2 - PCV13) 03/01/2003    Lipid screen  07/12/2019    Zostavax vaccine  Addressed    Flu vaccine  Completed

## 2017-12-08 RX ORDER — GLIPIZIDE 10 MG/1
10 TABLET ORAL DAILY
Qty: 180 TABLET | Refills: 1 | Status: SHIPPED | OUTPATIENT
Start: 2017-12-08 | End: 2017-12-18 | Stop reason: SDUPTHER

## 2017-12-08 RX ORDER — GLIPIZIDE 10 MG/1
10 TABLET ORAL DAILY
Qty: 180 TABLET | Refills: 1 | Status: SHIPPED | OUTPATIENT
Start: 2017-12-08 | End: 2017-12-08 | Stop reason: SDUPTHER

## 2017-12-11 RX ORDER — ROPINIROLE 2 MG/1
TABLET, FILM COATED ORAL
Qty: 90 TABLET | Refills: 1 | Status: SHIPPED | OUTPATIENT
Start: 2017-12-11 | End: 2018-08-03 | Stop reason: ALTCHOICE

## 2017-12-14 ENCOUNTER — HOSPITAL ENCOUNTER (EMERGENCY)
Age: 77
Discharge: HOME | End: 2017-12-14
Attending: EMERGENCY MEDICINE
Payer: MEDICARE

## 2017-12-14 VITALS
BODY MASS INDEX: 28.25 KG/M2 | HEART RATE: 57 BPM | RESPIRATION RATE: 18 BRPM | TEMPERATURE: 97.5 F | OXYGEN SATURATION: 92 % | HEIGHT: 67 IN | DIASTOLIC BLOOD PRESSURE: 73 MMHG | SYSTOLIC BLOOD PRESSURE: 134 MMHG | WEIGHT: 180 LBS

## 2017-12-14 DIAGNOSIS — L03.314 CELLULITIS OF GROIN, RIGHT: Primary | ICD-10-CM

## 2017-12-14 PROCEDURE — 99283 EMERGENCY DEPT VISIT LOW MDM: CPT

## 2017-12-14 RX ORDER — CEPHALEXIN 500 MG/1
500 CAPSULE ORAL 4 TIMES DAILY
Qty: 40 CAPSULE | Refills: 0 | Status: SHIPPED | OUTPATIENT
Start: 2017-12-14 | End: 2017-12-24

## 2017-12-14 RX ORDER — SULFAMETHOXAZOLE AND TRIMETHOPRIM 800; 160 MG/1; MG/1
1 TABLET ORAL 2 TIMES DAILY
Qty: 20 TABLET | Refills: 0 | Status: SHIPPED | OUTPATIENT
Start: 2017-12-14 | End: 2017-12-24

## 2017-12-14 ASSESSMENT — PAIN DESCRIPTION - LOCATION: LOCATION: ABDOMEN

## 2017-12-14 ASSESSMENT — ENCOUNTER SYMPTOMS
NAUSEA: 0
CONSTIPATION: 1
DIARRHEA: 0
SHORTNESS OF BREATH: 0
CHEST TIGHTNESS: 0
ABDOMINAL PAIN: 0
COLOR CHANGE: 1
VOMITING: 0

## 2017-12-14 ASSESSMENT — PAIN SCALES - GENERAL: PAINLEVEL_OUTOF10: 5

## 2017-12-14 ASSESSMENT — PAIN DESCRIPTION - ORIENTATION: ORIENTATION: RIGHT;LOWER

## 2017-12-14 ASSESSMENT — PAIN DESCRIPTION - PAIN TYPE: TYPE: ACUTE PAIN

## 2017-12-14 NOTE — ED PROVIDER NOTES
[address] ED  Emergency Department Encounter  Emergency Medicine Resident     Pt Name: Bhavna Hurt  MRN: [id number]  [place] 1940  Date of evaluation: 12/14/17  PCP:  Adam Forman DO    CHIEF COMPLAINT       Chief Complaint   Patient presents with    Cyst       HISTORY OF PRESENT ILLNESS  (Location/Symptom, Timing/Onset, Context/Setting, Quality, Duration, Modifying Factors, Severity.)      Bhavna Hurt is a 76 y.o. male who presents with A draining abscess. Patient says that he has had this over his right groin for the last 1 week. Associated symptoms include redness around the site. Patient says that since yesterday the abscess started draining both purulent as well as bloody fluid. However denies any fevers, chills, nausea, vomiting, shortness of breath, chest pain, abdominal pain. Denies any systemic signs of infections. Was afebrile in the emergency department and at his baseline. Past medical history is significant for a triple a repair, however there was multiple years ago and patient has no abdominal pain at this time. Patient already has a primary care appointment for tomorrow. No leg swelling reported. PAST MEDICAL / SURGICAL / SOCIAL / FAMILY HISTORY      has a past medical history of Abdominal aortic aneurysm (AAA) ([facility] [address] Alveolar hypoventilation; Asthma; BCC (basal cell carcinoma of skin); Bladder cancer [facility]); BPH (benign prostatic hyperplasia); Cataracts, bilateral; Chronic constipation; Chronic cor pulmonale ([address] Chronic hypoxemic respiratory failure (HCC); COPD (chronic obstructive pulmonary disease) ([facility] [address] Depression; Diverticulitis; GERD (gastroesophageal reflux disease); Hard of hearing; History of kidney stones; History of nasal obstruction; History of smoking; Hoarseness; Hyperlipidemia; Hypertension; Hypertrophy of nasal turbinates; Irritable bowel; MDRO (multiple drug resistant organisms) resistance; Mild obstructive sleep apnea;  On °C) (Oral)   Resp 18   Ht 5' 7\" (1.702 m)   Wt 180 lb (81.6 kg)   SpO2 92%   BMI 28.19 kg/m²     Physical Exam   Constitutional: He appears well-developed and well-nourished. No distress. HENT:   Head: Normocephalic and atraumatic. Eyes: Conjunctivae and EOM are normal.   Neck: Normal range of motion. Cardiovascular: Normal rate, regular rhythm and normal heart sounds. Exam reveals no gallop and no friction rub. No murmur heard. Pulmonary/Chest: Effort normal and breath sounds normal. No respiratory distress. He has no wheezes. He has no rales. Abdominal: Soft. He exhibits no distension. There is no tenderness. There is no rebound and no guarding. Musculoskeletal: He exhibits no tenderness. Skin: Skin is warm and dry. There is erythema. Right groin with significant erythema that is about 4 inches in diameter, small area in the center with serosanguineous drainage. Bedside ultrasound placed that showed no drainable fluid pocket. Significant induration surrounding however no fluctuance appreciated. DIFFERENTIAL  DIAGNOSIS     PLAN (LABS / IMAGING / EKG):  No orders of the defined types were placed in this encounter. MEDICATIONS ORDERED:  Orders Placed This Encounter   Medications    sulfamethoxazole-trimethoprim (BACTRIM DS) 800-160 MG per tablet     Sig: Take 1 tablet by mouth 2 times daily for 10 days     Dispense:  20 tablet     Refill:  0    cephALEXin (KEFLEX) 500 MG capsule     Sig: Take 1 capsule by mouth 4 times daily for 10 days     Dispense:  40 capsule     Refill:  0       DDX: cellulitis vs abscess     DIAGNOSTIC RESULTS / EMERGENCY DEPARTMENT COURSE / MDM     LABS:  No results found for this visit on 12/14/17. IMPRESSION: 75-year-old male comes in with a draining abscess and surrounding cellulitis over the right groin. An ultrasound was placed, patient no longer has any drainable fluid, patient says that his abscess spontaneously drained yesterday.   Continuing to

## 2017-12-14 NOTE — ED PROVIDER NOTES
[address] ED  eMERGENCY dEPARTMENT eNCOUnter   Attending Attestation     Pt Name: Nikolas Prado  MRN: [id number]  [place] 1940  Date of evaluation: 12/14/17       Nikolas Prado is a 76 y.o. male who presents with Cyst      MDM:   43-year-old male presents with a draining abscess and cellulitis to the right lower anterior abdominal wall. Patient states that he noticed some irritation and discomfort with a small area of drainage. He denies any systemic symptoms, has no fevers or chills. The patient's vital signs are stable, at this time we will provide oral antibiotics, the wound is draining and ultrasound showed no evidence of a drainable fluid collection. Patient was instructed to return immediately if he develops worsening fevers, chills nausea vomiting or for any other concerning symptoms he might develop. We did circumscribe the cellulitis with a skin marker and the patient was told to return if the cellulitis extends beyond these borders. Vitals:   Vitals:    12/14/17 1448   BP: 134/73   Pulse: 57   Resp: 18   Temp: 97.5 °F (36.4 °C)   TempSrc: Oral   SpO2: 92%   Weight: 180 lb (81.6 kg)   Height: 5' 7\" (1.702 m)         I personally evaluated and examined the patient in conjunction with the resident and agree with the assessment, treatment plan, and disposition of the patient as recorded by the resident. I performed a history and physical examination of the patient and discussed management with the resident. I reviewed the residents note and agree with the documented findings and plan of care. Any areas of disagreement are noted on the chart. I was personally present for the key portions of any procedures. I have documented in the chart those procedures where I was not present during the key portions. I have personally reviewed all images and agree with the resident's interpretation. I have reviewed the emergency nurses triage note.  I agree with the chief complaint, past

## 2017-12-18 ENCOUNTER — OFFICE VISIT (OUTPATIENT)
Dept: FAMILY MEDICINE CLINIC | Age: 77
End: 2017-12-18
Payer: MEDICARE

## 2017-12-18 VITALS
BODY MASS INDEX: 30.54 KG/M2 | DIASTOLIC BLOOD PRESSURE: 65 MMHG | TEMPERATURE: 98 F | HEIGHT: 67 IN | SYSTOLIC BLOOD PRESSURE: 94 MMHG | WEIGHT: 194.6 LBS | HEART RATE: 101 BPM

## 2017-12-18 DIAGNOSIS — L02.221 FURUNCLE OF ABDOMINAL WALL: Primary | ICD-10-CM

## 2017-12-18 DIAGNOSIS — R26.81 GAIT INSTABILITY: ICD-10-CM

## 2017-12-18 DIAGNOSIS — F32.A DEPRESSIVE DISORDER: ICD-10-CM

## 2017-12-18 DIAGNOSIS — E13.9 DIABETES 1.5, MANAGED AS TYPE 2 (HCC): ICD-10-CM

## 2017-12-18 PROCEDURE — G8484 FLU IMMUNIZE NO ADMIN: HCPCS | Performed by: FAMILY MEDICINE

## 2017-12-18 PROCEDURE — 4040F PNEUMOC VAC/ADMIN/RCVD: CPT | Performed by: FAMILY MEDICINE

## 2017-12-18 PROCEDURE — 1036F TOBACCO NON-USER: CPT | Performed by: FAMILY MEDICINE

## 2017-12-18 PROCEDURE — G8417 CALC BMI ABV UP PARAM F/U: HCPCS | Performed by: FAMILY MEDICINE

## 2017-12-18 PROCEDURE — 99213 OFFICE O/P EST LOW 20 MIN: CPT

## 2017-12-18 PROCEDURE — G8427 DOCREV CUR MEDS BY ELIG CLIN: HCPCS | Performed by: FAMILY MEDICINE

## 2017-12-18 PROCEDURE — 1123F ACP DISCUSS/DSCN MKR DOCD: CPT | Performed by: FAMILY MEDICINE

## 2017-12-18 PROCEDURE — 99213 OFFICE O/P EST LOW 20 MIN: CPT | Performed by: FAMILY MEDICINE

## 2017-12-18 RX ORDER — GLIPIZIDE 10 MG/1
10 TABLET ORAL
Qty: 180 TABLET | Refills: 3 | Status: ON HOLD | OUTPATIENT
Start: 2017-12-18 | End: 2018-03-05 | Stop reason: HOSPADM

## 2017-12-18 ASSESSMENT — ENCOUNTER SYMPTOMS
CHEST TIGHTNESS: 0
ABDOMINAL PAIN: 0
SHORTNESS OF BREATH: 0
COUGH: 0

## 2017-12-18 NOTE — PROGRESS NOTES
Subjective:      Patient ID: Jake Og is a 76 y.o. male. Inspect lower right abd. - open wound  Seen and treated within the week at [facility] ED - Dx - abcess - Rx'd - Keflex/Bactrim for 10 days  Wife watching it carefully - drainage has stopped  Denies fever, N/V or fatigue (out of the4 ordinary)        Review of Systems   Constitutional: Negative for fatigue, fever and unexpected weight change. Respiratory: Negative for cough, chest tightness and shortness of breath. Cardiovascular: Negative for chest pain. Gastrointestinal: Negative for abdominal pain. Genitourinary: Negative for dysuria. Skin: Positive for wound. Neurological: Negative for dizziness and weakness. Objective:   Physical Exam   Constitutional: He appears well-developed and well-nourished. Cardiovascular: Normal rate and regular rhythm. Distant Heart sounds - stable   Pulmonary/Chest: Effort normal and breath sounds normal.   Abdominal:   Tiny point of healing abscess open, drained, red, induration, measures 4-5 mm. Adjacent to the prior abscess he has been previously treated for. Area is clean and dry. No red streaking on skin, no foul smell       Assessment:      1. Furuncle of abdominal wall     2. Diabetes 1.5, managed as type 2 (HCC)  glipiZIDE (GLUCOTROL) 10 MG tablet   3. Gait instability  Handicap placard   4. Depressive disorder             Plan:      Continue ATB's (Keflex 4/d and Bactrim 2/d)  till gone [facility] ED ordered)    Labs reviewed (Pathology Labs - see copy in media) - A1c - 9.4 (medications adjusted - increase Humulin to 30 units twice daily) - glipizide 5 mg.  2/d. Follow DM closely. Will titrate up insulin slowly in next 90 days - follow A1c    Handicap permit - 2 copies - Dx. Dyspnea / 5 years    The patient is asked to return in 3 months.

## 2017-12-18 NOTE — PATIENT INSTRUCTIONS
Thank you for letting us take care of you today. We hope all your questions were addressed. If a question was overlooked or something else comes to mind after you return home, please contact a member of your Care Team listed below. Please make sure you have a routine office visit set up to follow-up on [address]. Your Care Team at [place] [address] is Team #2  Gerardo Ozuna DO (Faculty)  Stephanie Taveras MD (Resident)  Ming Smith MD (Resident)  Celena German MD (Resident)  Sherman Johnson MD (Resident)  Silas Borjas MD (Resident)  RAZ Mckeon., TOM Blank, COLLEEN Edge ([address])  Mirela Sanchez RN, ([address] [facility])  Alma Alvarez, Ph.D., (Behavioral Services)  Edna Mendoza, [facility] (Clinical Pharmacist)     Office phone number: [phone]    If you need to get in right away due to illness, please be advised we have \"Same Day\" appointments available Monday-Friday. Please call us at [phone] option #1 to schedule your \"Same Day\" appointment.

## 2018-02-21 ENCOUNTER — HOSPITAL ENCOUNTER (INPATIENT)
Age: 78
LOS: 27 days | Discharge: NURSING, REHAB OR LONG-TERM CARE | DRG: 871 | End: 2018-03-20
Attending: EMERGENCY MEDICINE | Admitting: INTERNAL MEDICINE
Payer: MEDICARE

## 2018-02-21 ENCOUNTER — APPOINTMENT (OUTPATIENT)
Dept: CT IMAGING | Age: 78
DRG: 871 | End: 2018-02-21
Payer: MEDICARE

## 2018-02-21 ENCOUNTER — APPOINTMENT (OUTPATIENT)
Dept: GENERAL RADIOLOGY | Age: 78
DRG: 871 | End: 2018-02-21
Payer: MEDICARE

## 2018-02-21 DIAGNOSIS — R79.89 ELEVATED D-DIMER: ICD-10-CM

## 2018-02-21 DIAGNOSIS — A41.9 SEPSIS, DUE TO UNSPECIFIED ORGANISM: Primary | ICD-10-CM

## 2018-02-21 DIAGNOSIS — E87.20 LACTIC ACIDOSIS: ICD-10-CM

## 2018-02-21 DIAGNOSIS — J44.1 COPD EXACERBATION (HCC): ICD-10-CM

## 2018-02-21 DIAGNOSIS — N40.1 BPH WITH OBSTRUCTION/LOWER URINARY TRACT SYMPTOMS: ICD-10-CM

## 2018-02-21 DIAGNOSIS — N13.8 BPH WITH OBSTRUCTION/LOWER URINARY TRACT SYMPTOMS: ICD-10-CM

## 2018-02-21 DIAGNOSIS — R39.12 WEAK URINARY STREAM: ICD-10-CM

## 2018-02-21 PROBLEM — E11.9 DIABETES MELLITUS TYPE 2, INSULIN DEPENDENT (HCC): Status: ACTIVE | Noted: 2018-02-21

## 2018-02-21 PROBLEM — Z79.4 DIABETES MELLITUS TYPE 2, INSULIN DEPENDENT (HCC): Status: ACTIVE | Noted: 2018-02-21

## 2018-02-21 LAB
-: ABNORMAL
ABSOLUTE EOS #: 0 K/UL (ref 0–0.4)
ABSOLUTE IMMATURE GRANULOCYTE: ABNORMAL K/UL (ref 0–0.3)
ABSOLUTE LYMPH #: 0.42 K/UL (ref 1–4.8)
ABSOLUTE MONO #: 1.25 K/UL (ref 0.1–1.3)
ALBUMIN SERPL-MCNC: 3.6 G/DL (ref 3.5–5.2)
ALBUMIN SERPL-MCNC: 3.9 G/DL (ref 3.5–5.2)
ALBUMIN/GLOBULIN RATIO: ABNORMAL (ref 1–2.5)
ALBUMIN/GLOBULIN RATIO: ABNORMAL (ref 1–2.5)
ALP BLD-CCNC: 56 U/L (ref 40–129)
ALP BLD-CCNC: 66 U/L (ref 40–129)
ALT SERPL-CCNC: 27 U/L (ref 5–41)
ALT SERPL-CCNC: 29 U/L (ref 5–41)
AMORPHOUS: ABNORMAL
ANION GAP SERPL CALCULATED.3IONS-SCNC: 14 MMOL/L (ref 9–17)
ANION GAP SERPL CALCULATED.3IONS-SCNC: 17 MMOL/L (ref 9–17)
AST SERPL-CCNC: 24 U/L
AST SERPL-CCNC: 24 U/L
BACTERIA: ABNORMAL
BASOPHILS # BLD: 0 % (ref 0–2)
BASOPHILS ABSOLUTE: 0 K/UL (ref 0–0.2)
BILIRUB SERPL-MCNC: 1.37 MG/DL (ref 0.3–1.2)
BILIRUB SERPL-MCNC: 1.64 MG/DL (ref 0.3–1.2)
BILIRUBIN DIRECT: 0.44 MG/DL
BILIRUBIN URINE: NEGATIVE
BILIRUBIN, INDIRECT: 1.2 MG/DL (ref 0–1)
BUN BLDV-MCNC: 17 MG/DL (ref 8–23)
BUN BLDV-MCNC: 19 MG/DL (ref 8–23)
BUN/CREAT BLD: ABNORMAL (ref 9–20)
BUN/CREAT BLD: ABNORMAL (ref 9–20)
CALCIUM SERPL-MCNC: 8.2 MG/DL (ref 8.6–10.4)
CALCIUM SERPL-MCNC: 9.2 MG/DL (ref 8.6–10.4)
CASTS UA: ABNORMAL /LPF
CHLORIDE BLD-SCNC: 100 MMOL/L (ref 98–107)
CHLORIDE BLD-SCNC: 96 MMOL/L (ref 98–107)
CO2: 24 MMOL/L (ref 20–31)
CO2: 25 MMOL/L (ref 20–31)
COLOR: YELLOW
COMMENT UA: ABNORMAL
CREAT SERPL-MCNC: 0.87 MG/DL (ref 0.7–1.2)
CREAT SERPL-MCNC: 0.96 MG/DL (ref 0.7–1.2)
CRYSTALS, UA: ABNORMAL /HPF
D-DIMER QUANTITATIVE: 7.17 MG/L FEU
DIFFERENTIAL TYPE: ABNORMAL
DIRECT EXAM: NORMAL
EKG ATRIAL RATE: 101 BPM
EKG Q-T INTERVAL: 378 MS
EKG QRS DURATION: 152 MS
EKG QTC CALCULATION (BAZETT): 490 MS
EKG R AXIS: -32 DEGREES
EKG T AXIS: -13 DEGREES
EKG VENTRICULAR RATE: 101 BPM
EOSINOPHILS RELATIVE PERCENT: 0 % (ref 0–4)
EPITHELIAL CELLS UA: ABNORMAL /HPF
GFR AFRICAN AMERICAN: >60 ML/MIN
GFR AFRICAN AMERICAN: >60 ML/MIN
GFR NON-AFRICAN AMERICAN: >60 ML/MIN
GFR NON-AFRICAN AMERICAN: >60 ML/MIN
GFR SERPL CREATININE-BSD FRML MDRD: ABNORMAL ML/MIN/{1.73_M2}
GLOBULIN: ABNORMAL G/DL (ref 1.5–3.8)
GLUCOSE BLD-MCNC: 279 MG/DL (ref 70–99)
GLUCOSE BLD-MCNC: 314 MG/DL (ref 70–99)
GLUCOSE URINE: ABNORMAL
HCT VFR BLD CALC: 44.4 % (ref 41–53)
HEMOGLOBIN: 15.1 G/DL (ref 13.5–17.5)
IMMATURE GRANULOCYTES: ABNORMAL %
INR BLD: 1.3
INR BLD: 1.4
KETONES, URINE: ABNORMAL
LACTIC ACID, SEPSIS WHOLE BLOOD: ABNORMAL MMOL/L (ref 0.5–1.9)
LACTIC ACID, SEPSIS: 2.9 MMOL/L (ref 0.5–1.9)
LACTIC ACID: 2.4 MMOL/L (ref 0.5–2.2)
LACTIC ACID: 4.1 MMOL/L (ref 0.5–2.2)
LEUKOCYTE ESTERASE, URINE: NEGATIVE
LYMPHOCYTES # BLD: 3 % (ref 24–44)
Lab: NORMAL
MCH RBC QN AUTO: 34 PG (ref 26–34)
MCHC RBC AUTO-ENTMCNC: 34 G/DL (ref 31–37)
MCV RBC AUTO: 100.1 FL (ref 80–100)
MONOCYTES # BLD: 9 % (ref 1–7)
MORPHOLOGY: ABNORMAL
MUCUS: ABNORMAL
MYOGLOBIN: 104 NG/ML (ref 28–72)
NITRITE, URINE: NEGATIVE
NRBC AUTOMATED: ABNORMAL PER 100 WBC
OTHER OBSERVATIONS UA: ABNORMAL
PARTIAL THROMBOPLASTIN TIME: 27.3 SEC (ref 23–31)
PDW BLD-RTO: 13.6 % (ref 11.5–14.9)
PH UA: 6 (ref 5–8)
PLATELET # BLD: 99 K/UL (ref 150–450)
PLATELET ESTIMATE: ABNORMAL
PMV BLD AUTO: 9 FL (ref 6–12)
POTASSIUM SERPL-SCNC: 4.4 MMOL/L (ref 3.7–5.3)
POTASSIUM SERPL-SCNC: 4.4 MMOL/L (ref 3.7–5.3)
PROTEIN UA: ABNORMAL
PROTHROMBIN TIME: 13.8 SEC (ref 9.7–12)
PROTHROMBIN TIME: 14.5 SEC (ref 9.7–12)
RBC # BLD: 4.44 M/UL (ref 4.5–5.9)
RBC # BLD: ABNORMAL 10*6/UL
RBC UA: ABNORMAL /HPF
RENAL EPITHELIAL, UA: ABNORMAL /HPF
SEG NEUTROPHILS: 88 % (ref 36–66)
SEGMENTED NEUTROPHILS ABSOLUTE COUNT: 12.23 K/UL (ref 1.3–9.1)
SODIUM BLD-SCNC: 138 MMOL/L (ref 135–144)
SODIUM BLD-SCNC: 138 MMOL/L (ref 135–144)
SPECIFIC GRAVITY UA: 1.02 (ref 1–1.03)
SPECIMEN DESCRIPTION: NORMAL
SPECIMEN DESCRIPTION: NORMAL
STATUS: NORMAL
TOTAL CK: 126 U/L (ref 39–308)
TOTAL PROTEIN: 7.1 G/DL (ref 6.4–8.3)
TOTAL PROTEIN: 7.7 G/DL (ref 6.4–8.3)
TRICHOMONAS: ABNORMAL
TROPONIN INTERP: NORMAL
TROPONIN T: <0.03 NG/ML
TURBIDITY: CLEAR
URINE HGB: NEGATIVE
UROBILINOGEN, URINE: NORMAL
WBC # BLD: 13.9 K/UL (ref 3.5–11)
WBC # BLD: ABNORMAL 10*3/UL
WBC UA: ABNORMAL /HPF
YEAST: ABNORMAL

## 2018-02-21 PROCEDURE — 82947 ASSAY GLUCOSE BLOOD QUANT: CPT

## 2018-02-21 PROCEDURE — 80048 BASIC METABOLIC PNL TOTAL CA: CPT

## 2018-02-21 PROCEDURE — 6370000000 HC RX 637 (ALT 250 FOR IP): Performed by: STUDENT IN AN ORGANIZED HEALTH CARE EDUCATION/TRAINING PROGRAM

## 2018-02-21 PROCEDURE — 83874 ASSAY OF MYOGLOBIN: CPT

## 2018-02-21 PROCEDURE — 87449 NOS EACH ORGANISM AG IA: CPT

## 2018-02-21 PROCEDURE — 87186 SC STD MICRODIL/AGAR DIL: CPT

## 2018-02-21 PROCEDURE — 72125 CT NECK SPINE W/O DYE: CPT

## 2018-02-21 PROCEDURE — 87804 INFLUENZA ASSAY W/OPTIC: CPT

## 2018-02-21 PROCEDURE — 72170 X-RAY EXAM OF PELVIS: CPT

## 2018-02-21 PROCEDURE — 85379 FIBRIN DEGRADATION QUANT: CPT

## 2018-02-21 PROCEDURE — 86403 PARTICLE AGGLUT ANTBDY SCRN: CPT

## 2018-02-21 PROCEDURE — 80076 HEPATIC FUNCTION PANEL: CPT

## 2018-02-21 PROCEDURE — 80053 COMPREHEN METABOLIC PANEL: CPT

## 2018-02-21 PROCEDURE — 87147 CULTURE TYPE IMMUNOLOGIC: CPT

## 2018-02-21 PROCEDURE — 96366 THER/PROPH/DIAG IV INF ADDON: CPT

## 2018-02-21 PROCEDURE — P9612 CATHETERIZE FOR URINE SPEC: HCPCS

## 2018-02-21 PROCEDURE — 96375 TX/PRO/DX INJ NEW DRUG ADDON: CPT

## 2018-02-21 PROCEDURE — 99285 EMERGENCY DEPT VISIT HI MDM: CPT

## 2018-02-21 PROCEDURE — 2580000003 HC RX 258: Performed by: STUDENT IN AN ORGANIZED HEALTH CARE EDUCATION/TRAINING PROGRAM

## 2018-02-21 PROCEDURE — 6370000000 HC RX 637 (ALT 250 FOR IP): Performed by: EMERGENCY MEDICINE

## 2018-02-21 PROCEDURE — 87040 BLOOD CULTURE FOR BACTERIA: CPT

## 2018-02-21 PROCEDURE — 96365 THER/PROPH/DIAG IV INF INIT: CPT

## 2018-02-21 PROCEDURE — 6360000004 HC RX CONTRAST MEDICATION: Performed by: EMERGENCY MEDICINE

## 2018-02-21 PROCEDURE — 83605 ASSAY OF LACTIC ACID: CPT

## 2018-02-21 PROCEDURE — 2580000003 HC RX 258: Performed by: EMERGENCY MEDICINE

## 2018-02-21 PROCEDURE — 85730 THROMBOPLASTIN TIME PARTIAL: CPT

## 2018-02-21 PROCEDURE — 94761 N-INVAS EAR/PLS OXIMETRY MLT: CPT

## 2018-02-21 PROCEDURE — 84484 ASSAY OF TROPONIN QUANT: CPT

## 2018-02-21 PROCEDURE — 94664 DEMO&/EVAL PT USE INHALER: CPT

## 2018-02-21 PROCEDURE — 85610 PROTHROMBIN TIME: CPT

## 2018-02-21 PROCEDURE — 71260 CT THORAX DX C+: CPT

## 2018-02-21 PROCEDURE — 36415 COLL VENOUS BLD VENIPUNCTURE: CPT

## 2018-02-21 PROCEDURE — 87086 URINE CULTURE/COLONY COUNT: CPT

## 2018-02-21 PROCEDURE — 6360000002 HC RX W HCPCS: Performed by: EMERGENCY MEDICINE

## 2018-02-21 PROCEDURE — 2060000000 HC ICU INTERMEDIATE R&B

## 2018-02-21 PROCEDURE — 94640 AIRWAY INHALATION TREATMENT: CPT

## 2018-02-21 PROCEDURE — 81001 URINALYSIS AUTO W/SCOPE: CPT

## 2018-02-21 PROCEDURE — 87205 SMEAR GRAM STAIN: CPT

## 2018-02-21 PROCEDURE — 70450 CT HEAD/BRAIN W/O DYE: CPT

## 2018-02-21 PROCEDURE — 87899 AGENT NOS ASSAY W/OPTIC: CPT

## 2018-02-21 PROCEDURE — 82550 ASSAY OF CK (CPK): CPT

## 2018-02-21 PROCEDURE — 6370000000 HC RX 637 (ALT 250 FOR IP): Performed by: INTERNAL MEDICINE

## 2018-02-21 PROCEDURE — 85025 COMPLETE CBC W/AUTO DIFF WBC: CPT

## 2018-02-21 PROCEDURE — 87149 DNA/RNA DIRECT PROBE: CPT

## 2018-02-21 PROCEDURE — 6360000002 HC RX W HCPCS: Performed by: STUDENT IN AN ORGANIZED HEALTH CARE EDUCATION/TRAINING PROGRAM

## 2018-02-21 PROCEDURE — 71046 X-RAY EXAM CHEST 2 VIEWS: CPT

## 2018-02-21 PROCEDURE — 86738 MYCOPLASMA ANTIBODY: CPT

## 2018-02-21 RX ORDER — SODIUM CHLORIDE 9 MG/ML
INJECTION, SOLUTION INTRAVENOUS CONTINUOUS
Status: DISCONTINUED | OUTPATIENT
Start: 2018-02-21 | End: 2018-02-27

## 2018-02-21 RX ORDER — POTASSIUM CHLORIDE 7.45 MG/ML
10 INJECTION INTRAVENOUS PRN
Status: DISCONTINUED | OUTPATIENT
Start: 2018-02-21 | End: 2018-03-20 | Stop reason: HOSPADM

## 2018-02-21 RX ORDER — DEXTROSE MONOHYDRATE 25 G/50ML
12.5 INJECTION, SOLUTION INTRAVENOUS PRN
Status: DISCONTINUED | OUTPATIENT
Start: 2018-02-21 | End: 2018-03-20 | Stop reason: HOSPADM

## 2018-02-21 RX ORDER — NICOTINE POLACRILEX 4 MG
15 LOZENGE BUCCAL PRN
Status: DISCONTINUED | OUTPATIENT
Start: 2018-02-21 | End: 2018-03-20 | Stop reason: HOSPADM

## 2018-02-21 RX ORDER — 0.9 % SODIUM CHLORIDE 0.9 %
1000 INTRAVENOUS SOLUTION INTRAVENOUS ONCE
Status: COMPLETED | OUTPATIENT
Start: 2018-02-21 | End: 2018-02-21

## 2018-02-21 RX ORDER — SODIUM CHLORIDE 0.9 % (FLUSH) 0.9 %
10 SYRINGE (ML) INJECTION PRN
Status: DISCONTINUED | OUTPATIENT
Start: 2018-02-21 | End: 2018-03-20 | Stop reason: HOSPADM

## 2018-02-21 RX ORDER — ALBUTEROL SULFATE 90 UG/1
2 AEROSOL, METERED RESPIRATORY (INHALATION)
Status: DISCONTINUED | OUTPATIENT
Start: 2018-02-21 | End: 2018-02-21

## 2018-02-21 RX ORDER — 0.9 % SODIUM CHLORIDE 0.9 %
100 INTRAVENOUS SOLUTION INTRAVENOUS ONCE
Status: COMPLETED | OUTPATIENT
Start: 2018-02-21 | End: 2018-02-21

## 2018-02-21 RX ORDER — ACETAMINOPHEN 325 MG/1
650 TABLET ORAL ONCE
Status: COMPLETED | OUTPATIENT
Start: 2018-02-21 | End: 2018-02-21

## 2018-02-21 RX ORDER — TAMSULOSIN HYDROCHLORIDE 0.4 MG/1
CAPSULE ORAL
Qty: 90 CAPSULE | Refills: 3 | Status: SHIPPED | OUTPATIENT
Start: 2018-02-21 | End: 2018-02-21 | Stop reason: ALTCHOICE

## 2018-02-21 RX ORDER — CITALOPRAM 20 MG/1
20 TABLET ORAL EVERY MORNING
Status: DISCONTINUED | OUTPATIENT
Start: 2018-02-22 | End: 2018-02-22

## 2018-02-21 RX ORDER — QUINAPRIL 10 MG/1
40 TABLET ORAL DAILY
Status: DISCONTINUED | OUTPATIENT
Start: 2018-02-21 | End: 2018-02-21 | Stop reason: CLARIF

## 2018-02-21 RX ORDER — ALBUTEROL SULFATE 90 UG/1
2 AEROSOL, METERED RESPIRATORY (INHALATION) EVERY 4 HOURS PRN
Status: DISCONTINUED | OUTPATIENT
Start: 2018-02-21 | End: 2018-03-12

## 2018-02-21 RX ORDER — SODIUM CHLORIDE 0.9 % (FLUSH) 0.9 %
10 SYRINGE (ML) INJECTION PRN
Status: DISCONTINUED | OUTPATIENT
Start: 2018-02-21 | End: 2018-02-21 | Stop reason: SDUPTHER

## 2018-02-21 RX ORDER — 0.9 % SODIUM CHLORIDE 0.9 %
30 INTRAVENOUS SOLUTION INTRAVENOUS ONCE
Status: COMPLETED | OUTPATIENT
Start: 2018-02-21 | End: 2018-02-21

## 2018-02-21 RX ORDER — MAGNESIUM SULFATE 1 G/100ML
1 INJECTION INTRAVENOUS PRN
Status: DISCONTINUED | OUTPATIENT
Start: 2018-02-21 | End: 2018-03-20 | Stop reason: HOSPADM

## 2018-02-21 RX ORDER — POTASSIUM CHLORIDE 20MEQ/15ML
40 LIQUID (ML) ORAL PRN
Status: DISCONTINUED | OUTPATIENT
Start: 2018-02-21 | End: 2018-03-20 | Stop reason: HOSPADM

## 2018-02-21 RX ORDER — FINASTERIDE 5 MG/1
5 TABLET, FILM COATED ORAL DAILY
Status: DISCONTINUED | OUTPATIENT
Start: 2018-02-21 | End: 2018-03-20 | Stop reason: HOSPADM

## 2018-02-21 RX ORDER — TAMSULOSIN HYDROCHLORIDE 0.4 MG/1
0.4 CAPSULE ORAL DAILY
Status: DISCONTINUED | OUTPATIENT
Start: 2018-02-21 | End: 2018-03-07

## 2018-02-21 RX ORDER — LOVASTATIN 20 MG/1
40 TABLET ORAL NIGHTLY
Status: DISCONTINUED | OUTPATIENT
Start: 2018-02-21 | End: 2018-02-21 | Stop reason: CLARIF

## 2018-02-21 RX ORDER — FINASTERIDE 5 MG/1
5 TABLET, FILM COATED ORAL DAILY
Qty: 90 TABLET | Refills: 3 | Status: SHIPPED | OUTPATIENT
Start: 2018-02-21 | End: 2018-08-13 | Stop reason: SDUPTHER

## 2018-02-21 RX ORDER — FINASTERIDE 5 MG/1
1 TABLET, FILM COATED ORAL DAILY
Status: DISCONTINUED | OUTPATIENT
Start: 2018-02-21 | End: 2018-02-21 | Stop reason: CLARIF

## 2018-02-21 RX ORDER — IPRATROPIUM BROMIDE AND ALBUTEROL SULFATE 2.5; .5 MG/3ML; MG/3ML
1 SOLUTION RESPIRATORY (INHALATION)
Status: DISCONTINUED | OUTPATIENT
Start: 2018-02-21 | End: 2018-02-24

## 2018-02-21 RX ORDER — ROPINIROLE 2 MG/1
2 TABLET, FILM COATED ORAL DAILY
Status: DISCONTINUED | OUTPATIENT
Start: 2018-02-21 | End: 2018-02-24

## 2018-02-21 RX ORDER — IPRATROPIUM BROMIDE AND ALBUTEROL SULFATE 2.5; .5 MG/3ML; MG/3ML
1 SOLUTION RESPIRATORY (INHALATION)
Status: DISCONTINUED | OUTPATIENT
Start: 2018-02-21 | End: 2018-02-21

## 2018-02-21 RX ORDER — ALBUTEROL SULFATE 2.5 MG/3ML
2.5 SOLUTION RESPIRATORY (INHALATION) EVERY 6 HOURS PRN
Status: DISCONTINUED | OUTPATIENT
Start: 2018-02-21 | End: 2018-03-12

## 2018-02-21 RX ORDER — METHYLPREDNISOLONE SODIUM SUCCINATE 125 MG/2ML
125 INJECTION, POWDER, LYOPHILIZED, FOR SOLUTION INTRAMUSCULAR; INTRAVENOUS ONCE
Status: COMPLETED | OUTPATIENT
Start: 2018-02-21 | End: 2018-02-21

## 2018-02-21 RX ORDER — ALBUTEROL SULFATE 2.5 MG/3ML
5 SOLUTION RESPIRATORY (INHALATION)
Status: DISCONTINUED | OUTPATIENT
Start: 2018-02-21 | End: 2018-02-21

## 2018-02-21 RX ORDER — SIMVASTATIN 20 MG
20 TABLET ORAL NIGHTLY
Status: DISCONTINUED | OUTPATIENT
Start: 2018-02-21 | End: 2018-03-20 | Stop reason: HOSPADM

## 2018-02-21 RX ORDER — SODIUM CHLORIDE 0.9 % (FLUSH) 0.9 %
10 SYRINGE (ML) INJECTION EVERY 12 HOURS SCHEDULED
Status: DISCONTINUED | OUTPATIENT
Start: 2018-02-21 | End: 2018-03-20 | Stop reason: HOSPADM

## 2018-02-21 RX ORDER — INSULIN GLARGINE 100 [IU]/ML
20 INJECTION, SOLUTION SUBCUTANEOUS NIGHTLY
Status: DISCONTINUED | OUTPATIENT
Start: 2018-02-21 | End: 2018-02-24

## 2018-02-21 RX ORDER — DEXTROSE MONOHYDRATE 50 MG/ML
100 INJECTION, SOLUTION INTRAVENOUS PRN
Status: DISCONTINUED | OUTPATIENT
Start: 2018-02-21 | End: 2018-03-20 | Stop reason: HOSPADM

## 2018-02-21 RX ORDER — FAMOTIDINE 20 MG/1
20 TABLET, FILM COATED ORAL 2 TIMES DAILY
Status: DISCONTINUED | OUTPATIENT
Start: 2018-02-21 | End: 2018-02-24

## 2018-02-21 RX ORDER — POTASSIUM CHLORIDE 20 MEQ/1
40 TABLET, EXTENDED RELEASE ORAL PRN
Status: DISCONTINUED | OUTPATIENT
Start: 2018-02-21 | End: 2018-03-20 | Stop reason: HOSPADM

## 2018-02-21 RX ORDER — ACETAMINOPHEN 325 MG/1
650 TABLET ORAL EVERY 4 HOURS PRN
Status: DISCONTINUED | OUTPATIENT
Start: 2018-02-21 | End: 2018-03-20 | Stop reason: HOSPADM

## 2018-02-21 RX ORDER — LISINOPRIL 20 MG/1
20 TABLET ORAL DAILY
Status: DISCONTINUED | OUTPATIENT
Start: 2018-02-21 | End: 2018-02-24

## 2018-02-21 RX ADMIN — SIMVASTATIN 20 MG: 20 TABLET, FILM COATED ORAL at 22:13

## 2018-02-21 RX ADMIN — TAMSULOSIN HYDROCHLORIDE 0.4 MG: 0.4 CAPSULE ORAL at 22:13

## 2018-02-21 RX ADMIN — SODIUM CHLORIDE 1000 ML: 9 INJECTION, SOLUTION INTRAVENOUS at 15:43

## 2018-02-21 RX ADMIN — MAGNESIUM HYDROXIDE 30 ML: 400 SUSPENSION ORAL at 18:30

## 2018-02-21 RX ADMIN — IOPAMIDOL 100 ML: 755 INJECTION, SOLUTION INTRAVENOUS at 17:20

## 2018-02-21 RX ADMIN — CEFTRIAXONE SODIUM 1 G: 1 INJECTION, POWDER, FOR SOLUTION INTRAMUSCULAR; INTRAVENOUS at 17:21

## 2018-02-21 RX ADMIN — IPRATROPIUM BROMIDE AND ALBUTEROL SULFATE 1 AMPULE: .5; 3 SOLUTION RESPIRATORY (INHALATION) at 20:37

## 2018-02-21 RX ADMIN — AZITHROMYCIN MONOHYDRATE 500 MG: 500 INJECTION, POWDER, LYOPHILIZED, FOR SOLUTION INTRAVENOUS at 17:46

## 2018-02-21 RX ADMIN — FINASTERIDE 5 MG: 5 TABLET, FILM COATED ORAL at 22:13

## 2018-02-21 RX ADMIN — INSULIN LISPRO 5 UNITS: 100 INJECTION, SOLUTION INTRAVENOUS; SUBCUTANEOUS at 22:17

## 2018-02-21 RX ADMIN — PIPERACILLIN SODIUM,TAZOBACTAM SODIUM 3.38 G: 3; .375 INJECTION, POWDER, FOR SOLUTION INTRAVENOUS at 22:13

## 2018-02-21 RX ADMIN — MOMETASONE FUROATE AND FORMOTEROL FUMARATE DIHYDRATE 2 PUFF: 200; 5 AEROSOL RESPIRATORY (INHALATION) at 22:14

## 2018-02-21 RX ADMIN — ROPINIROLE HYDROCHLORIDE 2 MG: 2 TABLET, FILM COATED ORAL at 22:13

## 2018-02-21 RX ADMIN — LISINOPRIL 20 MG: 20 TABLET ORAL at 22:13

## 2018-02-21 RX ADMIN — Medication 2 PUFF: at 17:50

## 2018-02-21 RX ADMIN — FAMOTIDINE 20 MG: 20 TABLET, FILM COATED ORAL at 22:13

## 2018-02-21 RX ADMIN — METHYLPREDNISOLONE SODIUM SUCCINATE 125 MG: 125 INJECTION, POWDER, FOR SOLUTION INTRAMUSCULAR; INTRAVENOUS at 15:43

## 2018-02-21 RX ADMIN — Medication 10 ML: at 17:20

## 2018-02-21 RX ADMIN — ENOXAPARIN SODIUM 40 MG: 40 INJECTION SUBCUTANEOUS at 22:14

## 2018-02-21 RX ADMIN — Medication 20 UNITS: at 22:17

## 2018-02-21 RX ADMIN — ACETAMINOPHEN 650 MG: 325 TABLET, FILM COATED ORAL at 15:43

## 2018-02-21 RX ADMIN — SODIUM CHLORIDE 100 ML: 9 INJECTION, SOLUTION INTRAVENOUS at 17:19

## 2018-02-21 RX ADMIN — SODIUM CHLORIDE 2694 ML: 9 INJECTION, SOLUTION INTRAVENOUS at 17:20

## 2018-02-21 RX ADMIN — SODIUM CHLORIDE: 9 INJECTION, SOLUTION INTRAVENOUS at 22:07

## 2018-02-21 ASSESSMENT — ENCOUNTER SYMPTOMS
DIARRHEA: 0
NAUSEA: 1
CONSTIPATION: 0
VOMITING: 0
BACK PAIN: 1
SPUTUM PRODUCTION: 1
ABDOMINAL PAIN: 0
NAUSEA: 0
EYE PAIN: 0
RHINORRHEA: 0
COUGH: 1
SHORTNESS OF BREATH: 1
SHORTNESS OF BREATH: 0

## 2018-02-21 ASSESSMENT — PAIN SCALES - GENERAL: PAINLEVEL_OUTOF10: 0

## 2018-02-21 NOTE — ED PROVIDER NOTES
MD Ameena   finasteride (PROSCAR) 5 MG tablet TAKE 1 TABLET BY MOUTH DAILY 2/21/18   Danielle Oden MD   glipiZIDE (GLUCOTROL) 10 MG tablet Take 1 tablet by mouth 2 times daily (before meals) 12/18/17   Karen Snyder DO   rOPINIRole (REQUIP) 2 MG tablet TAKE ONE TABLET BY MOUTH EVERY EVENING AS DIRECTED 12/11/17 1/10/18  Laura Renner, DO   insulin NPH (HUMULIN N KWIKPEN) 100 UNIT/ML injection pen Inject 30 Units into the skin 2 times daily (before meals) 11/20/17 3/28/18  Karen Snyder DO   quinapril (ACCUPRIL) 40 MG tablet Take 1 tablet by mouth daily 11/20/17   Karen Snyder DO   B-D UF III MINI PEN NEEDLES 31G X 5 MM MISC USE WITH INSULIN 4 TIMES DAILY 11/15/17   Karen Snyder DO   lovastatin (MEVACOR) 40 MG tablet TAKE 1 TABLET BY MOUTH NIGHTLY 10/31/17 4/29/18  Karen Snyder DO   citalopram (CELEXA) 20 MG tablet TAKE 1 TABLET BY MOUTH EVERY MORNING 10/31/17   Karen Snyder DO   PROAIR  (90 BASE) MCG/ACT inhaler INHALE 2 PUFFS USING INHALER EVERY 4 HOURS AS NEEDED 4/25/16   Donna Renner, DO   fluticasone-salmeterol (ADVAIR) 250-50 MCG/DOSE AEPB Inhale 1 puff into the lungs every 12 hours. 4/10/14   Karen Snyder DO       REVIEW OF SYSTEMS    (2-9 systems for level 4, 10 or more for level 5)      Review of Systems   Constitutional: Negative for chills and fever. HENT: Negative for congestion and rhinorrhea. Eyes: Negative for pain and visual disturbance. Respiratory: Positive for cough. Negative for shortness of breath. Cardiovascular: Negative for chest pain and palpitations. Gastrointestinal: Negative for abdominal pain, constipation, diarrhea, nausea and vomiting. Genitourinary: Negative for difficulty urinating and dysuria. Musculoskeletal: Negative for gait problem and neck pain. Skin: Negative for rash and wound. Neurological: Negative for dizziness, weakness and numbness.        PHYSICAL EXAM   (up to 7 for level 4, 8 or more the hospital encounter of 02/21/18   Rapid influenza A/B antigens   Result Value Ref Range    Specimen Description       . NASOPHARYNGEAL SWAB Performed at [facility]: NARAYAN [address]    Specimen Description  Rigoberto Brown. [address] [phone]     Special Requests NOT REPORTED     Direct Exam PRESUMPTIVE NEGATIVE for Influenza A + B antigens. Direct Exam       PCR testing to confirm this result is available upon request.  Specimen will be    Direct Exam        saved in the laboratory for 7 days. Please call [phone] if PCR testing is    Direct Exam  indicated.      Status FINAL 02/21/2018    CBC Auto Differential   Result Value Ref Range    WBC 13.9 (H) 3.5 - 11.0 k/uL    RBC 4.44 (L) 4.5 - 5.9 m/uL    Hemoglobin 15.1 13.5 - 17.5 g/dL    Hematocrit 44.4 41 - 53 %    .1 (H) 80 - 100 fL    MCH 34.0 26 - 34 pg    MCHC 34.0 31 - 37 g/dL    RDW 13.6 11.5 - 14.9 %    Platelets 99 (L) 479 - 450 k/uL    MPV 9.0 6.0 - 12.0 fL    NRBC Automated NOT REPORTED per 100 WBC    Differential Type NOT REPORTED     Immature Granulocytes NOT REPORTED 0 %    Absolute Immature Granulocyte NOT REPORTED 0.00 - 0.30 k/uL    WBC Morphology NOT REPORTED     RBC Morphology NOT REPORTED     Platelet Estimate NOT REPORTED     Seg Neutrophils 88 (H) 36 - 66 %    Lymphocytes 3 (L) 24 - 44 %    Monocytes 9 (H) 1 - 7 %    Eosinophils % 0 0 - 4 %    Basophils 0 0 - 2 %    Segs Absolute 12.23 (H) 1.3 - 9.1 k/uL    Absolute Lymph # 0.42 (L) 1.0 - 4.8 k/uL    Absolute Mono # 1.25 0.1 - 1.3 k/uL    Absolute Eos # 0.00 0.0 - 0.4 k/uL    Basophils # 0.00 0.0 - 0.2 k/uL    Morphology MACROCYTOSIS PRESENT    Basic Metabolic Panel   Result Value Ref Range    Glucose 279 (H) 70 - 99 mg/dL    BUN 19 8 - 23 mg/dL    CREATININE 0.96 0.70 - 1.20 mg/dL    Bun/Cre Ratio NOT REPORTED 9 - 20    Calcium 9.2 8.6 - 10.4 mg/dL    Sodium 138 135 - 144 mmol/L    Potassium 4.4 3.7 - 5.3 mmol/L    Chloride 96 (L) 98 - 107 mmol/L    CO2 25 20 - 31 mmol/L    Anion Gap 17 9 - 17 mmol/L    GFR Non-African American >60 >60 mL/min    GFR African American >60 >60 mL/min    GFR Comment          GFR Staging NOT REPORTED    Troponin   Result Value Ref Range    Troponin T <0.03 <0.03 ng/mL    Troponin Interp         D-Dimer, Quantitative   Result Value Ref Range    D-Dimer, Quant 7.17 (H) <0.50 mg/L FEU   Lactic Acid   Result Value Ref Range    Lactic Acid 4.1 (H) 0.5 - 2.2 mmol/L   Creatine Kinase   Result Value Ref Range    Total  39 - 308 U/L   Myoglobin   Result Value Ref Range    Myoglobin 104 (H) 28 - 72 ng/mL   Protime-INR   Result Value Ref Range    Protime 13.8 (H) 9.7 - 12.0 sec    INR 1.3    APTT   Result Value Ref Range    PTT 27.3 23.0 - 31.0 sec   Hepatic Function Panel   Result Value Ref Range    Alb 3.9 3.5 - 5.2 g/dL    Alkaline Phosphatase 66 40 - 129 U/L    ALT 29 5 - 41 U/L    AST 24 <40 U/L    Total Bilirubin 1.64 (H) 0.3 - 1.2 mg/dL    Bilirubin, Direct 0.44 (H) <0.31 mg/dL    Bilirubin, Indirect 1.20 (H) 0.00 - 1.00 mg/dL    Total Protein 7.7 6.4 - 8.3 g/dL    Globulin NOT REPORTED 1.5 - 3.8 g/dL    Albumin/Globulin Ratio NOT REPORTED 1.0 - 2.5   EKG 12 Lead   Result Value Ref Range    Ventricular Rate 101 BPM    Atrial Rate 101 BPM    QRS Duration 152 ms    Q-T Interval 378 ms    QTc Calculation (Bazett) 490 ms    R Axis -32 degrees    T Axis -13 degrees       IMPRESSION: Patient evaluated by myself and attending physician. Patient appears in no acute distress. Patient does appear very dry likely is having difficult time swallowing. Patient states this is chronic. Patient was tachycardic on arrival at 108 with respiratory rate documented at 32. Patient normally on nasal cannula and placed on nasal cannula with O2 saturation going above 92%. Patient in no respiratory distress, is able speak in full sentences, and with no accessory muscle usage. Patient does appear to have mild increased work of breathing.   Abdomen soft, nontender, nondistended. Heart rate mildly tachycardic with regular rhythm. Lungs clear to auscultation with diminished breath sounds bilaterally. Patient no focal neurologic deficits on exam.  No cervical tenderness, full range of cervical spine, and no reproducible numbness with compression. We'll continue with imaging, lab work, and symptomatic treatment. Plan for admission. RADIOLOGY:  Xr Chest Standard (2 Vw)    Result Date: 2/21/2018  EXAMINATION: TWO VIEWS OF THE CHEST 2/21/2018 4:08 pm COMPARISON: Two-view chest from 03/24/2017 HISTORY: ORDERING SYSTEM PROVIDED HISTORY: fall TECHNOLOGIST PROVIDED HISTORY: Reason for exam:->fall Ordering Physician Provided Reason for Exam: fall Acuity: Acute Type of Exam: Initial Mechanism of Injury: Pt states that he got dizzy and fell off toilet today. Left hip discomfort Relevant Medical/Surgical History: Pt states that he got dizzy and fell off toilet today. Left hip discomfort Additional history of hypertension, bladder cancer, gastroesophageal reflux disease, asthma, tobacco abuse, chronic obstructive pulmonary disease and respiratory failure, and multiple drug resistant organisms. FINDINGS: Overlying ECG monitor leads. Mildly enlarged but stable appearing cardiac silhouette. Uncoiled and calcified thoracic aorta, similar by comparison. Mild basilar atelectasis or scarring, best seen right lateral base. No consolidation or sizable pleural effusion. No pneumothorax. No obvious rib fracture. Some loss mid thoracic vertebral body height of indeterminate age. Mild kyphoscoliosis thoracic spine with some osteopenia and DJD. Clips status post cholecystectomy. No acute cardiopulmonary disease. Bibasilar atelectasis or scarring. Some loss mid thoracic vertebral body height, possibly chronic. Correlate clinically.      Xr Pelvis (1-2 Views)    Result Date: 2/21/2018  EXAMINATION: SINGLE VIEW OF THE PELVIS 2/21/2018 4:09 pm COMPARISON: CT angiography of the abdomen and COPD exacerbation ([facility] [address]    3. Lactic acidosis    4. Elevated d-dimer          DISPOSITION / PLAN     DISPOSITION Decision To Admit 02/21/2018 05:51:26 PM      PATIENT REFERRED TO:  No follow-up provider specified.     DISCHARGE MEDICATIONS:  New Prescriptions    No medications on file       Melita Serrano DO  Emergency Medicine Resident    (Please note that portions of this note were completed with a voice recognition program.  Efforts were made to edit the dictations but occasionally words are mis-transcribed.)        Melita Serrano DO  02/22/18 0036

## 2018-02-21 NOTE — TELEPHONE ENCOUNTER
Pt was last seen for a cysto on 9/5/17 and is scheduled to return for another cysto on 3/6/18. Requested medications were last ordered on 3/21/17 for a year supply. Ok to refill year supply as requested.

## 2018-02-21 NOTE — H&P
[facility]    HISTORY AND PHYSICAL EXAMINATION            Date:   2/21/2018  Patient name:  Bree Doctor  Date of admission:  2/21/2018  2:48 PM  MRN:   [id number]  Account:  [de-identified]  YOB: 1940  PCP:    Cynthia Duarte DO  Room:   11/11  Code Status:    No Order    Chief Complaint:     Chief Complaint   Patient presents with    Shortness of Breath     History Obtained From:     patient, spouse    History of Present Illness: The patient is a 76 y.o. Non-/non  male who presents with Shortness of Breath   and he is admitted to the hospital for the management of SOB and lightheadedness. Hx COPD, DM II, HTN, HLD; hx nephrolithiasis and bladder cysts with chronic UTIs per family. Per patient felt lightheaded while using the bathroom, dizziness without syncope, fell from toilet today and was unable to get up. No LOC. Low fever and chills for past 1-2 days. Rhinorrhea and congestion. Nausea without emesis today. Chronic SOB, on 2L home O2 daytime with exercise and nighttime, but patient only using at nighttime. Baseline sputum production, usually unable to bring up. No chest pain. No abd pain. No flank pain. Per patient no change in fluid intake or urination, but per family patient not keeping hydrated. No diarrhea/constipation.     Past Medical History:     Past Medical History:   Diagnosis Date    Abdominal aortic aneurysm (AAA) ([address]     small, post endovascular repair    Alveolar hypoventilation     on oxygen    Asthma     BCC (basal cell carcinoma of skin)     Bladder cancer (HCC)     BPH (benign prostatic hyperplasia)     Cataracts, bilateral     hx of    Chronic constipation     Chronic cor pulmonale (HCC)     on nocturnal oxygen    Chronic hypoxemic respiratory failure (HCC)     COPD (chronic obstructive pulmonary disease) (HCC)     stage II    Depression     Diverticulitis     GERD available upon request.  Specimen will be    Direct Exam        saved in the laboratory for 7 days. Please call [phone] if PCR testing is    Direct Exam  indicated.      Status FINAL 02/21/2018    D-Dimer, Quantitative    Collection Time: 02/21/18  3:22 PM   Result Value Ref Range    D-Dimer, Quant 7.17 (H) <0.50 mg/L FEU   Lactic Acid    Collection Time: 02/21/18  3:22 PM   Result Value Ref Range    Lactic Acid 4.1 (H) 0.5 - 2.2 mmol/L   Creatine Kinase    Collection Time: 02/21/18  3:22 PM   Result Value Ref Range    Total  39 - 308 U/L   Myoglobin    Collection Time: 02/21/18  3:22 PM   Result Value Ref Range    Myoglobin 104 (H) 28 - 72 ng/mL   Protime-INR    Collection Time: 02/21/18  3:22 PM   Result Value Ref Range    Protime 13.8 (H) 9.7 - 12.0 sec    INR 1.3    APTT    Collection Time: 02/21/18  3:22 PM   Result Value Ref Range    PTT 27.3 23.0 - 31.0 sec   Hepatic Function Panel    Collection Time: 02/21/18  3:22 PM   Result Value Ref Range    Alb 3.9 3.5 - 5.2 g/dL    Alkaline Phosphatase 66 40 - 129 U/L    ALT 29 5 - 41 U/L    AST 24 <40 U/L    Total Bilirubin 1.64 (H) 0.3 - 1.2 mg/dL    Bilirubin, Direct 0.44 (H) <0.31 mg/dL    Bilirubin, Indirect 1.20 (H) 0.00 - 1.00 mg/dL    Total Protein 7.7 6.4 - 8.3 g/dL    Globulin NOT REPORTED 1.5 - 3.8 g/dL    Albumin/Globulin Ratio NOT REPORTED 1.0 - 2.5   EKG 12 Lead    Collection Time: 02/21/18  3:39 PM   Result Value Ref Range    Ventricular Rate 101 BPM    Atrial Rate 101 BPM    QRS Duration 152 ms    Q-T Interval 378 ms    QTc Calculation (Bazett) 490 ms    R Axis -32 degrees    T Axis -13 degrees   Troponin    Collection Time: 02/21/18  5:45 PM   Result Value Ref Range    Troponin T <0.03 <0.03 ng/mL    Troponin Interp         Lactic Acid    Collection Time: 02/21/18  5:45 PM   Result Value Ref Range    Lactic Acid 2.4 (H) 0.5 - 2.2 mmol/L   Urinalysis with Microscopic    Collection Time: 02/21/18  5:50 PM   Result Value Ref Range    Color, UA YELLOW YEL    Turbidity UA CLEAR CLEAR    Glucose, Ur 3+ (A) NEG    Bilirubin Urine NEGATIVE NEG    Ketones, Urine TRACE (A) NEG    Specific Gravity, UA 1.024 1.000 - 1.030    Urine Hgb NEGATIVE NEG    pH, UA 6.0 5.0 - 8.0    Protein, UA 2+ (A) NEG    Urobilinogen, Urine Normal NORM    Nitrite, Urine NEGATIVE NEG    Leukocyte Esterase, Urine NEGATIVE NEG    Urinalysis Comments NOT REPORTED     -          WBC, UA 0 TO 2 /HPF    RBC, UA 0 TO 2 /HPF    Casts UA NOT REPORTED /LPF    Crystals UA NOT REPORTED NONE /HPF    Epithelial Cells UA None /HPF    Renal Epithelial, Urine NOT REPORTED 0 /HPF    Bacteria, UA None NONE    Mucus, UA NOT REPORTED NONE    Trichomonas, UA NOT REPORTED NONE    Amorphous, UA NOT REPORTED NONE    Other Observations UA NOT REPORTED NREQ    Yeast, UA NOT REPORTED NONE       Imaging/Diagnostics:  Xr Chest Standard (2 Vw)    Result Date: 2/21/2018  EXAMINATION: TWO VIEWS OF THE CHEST 2/21/2018 4:08 pm COMPARISON: Two-view chest from 03/24/2017 HISTORY: ORDERING SYSTEM PROVIDED HISTORY: fall TECHNOLOGIST PROVIDED HISTORY: Reason for exam:->fall Ordering Physician Provided Reason for Exam: fall Acuity: Acute Type of Exam: Initial Mechanism of Injury: Pt states that he got dizzy and fell off toilet today. Left hip discomfort Relevant Medical/Surgical History: Pt states that he got dizzy and fell off toilet today. Left hip discomfort Additional history of hypertension, bladder cancer, gastroesophageal reflux disease, asthma, tobacco abuse, chronic obstructive pulmonary disease and respiratory failure, and multiple drug resistant organisms. FINDINGS: Overlying ECG monitor leads. Mildly enlarged but stable appearing cardiac silhouette. Uncoiled and calcified thoracic aorta, similar by comparison. Mild basilar atelectasis or scarring, best seen right lateral base. No consolidation or sizable pleural effusion. No pneumothorax. No obvious rib fracture.   Some loss mid thoracic vertebral body height of impingement on the neural foramina noted, the latter greatest on the left at C5 -C6 and C6 -C7. Mild multilevel facet arthropathy and additional spondylitic changes. Some degenerative change C1-C2 with mild pannus formation. No bony erosion. Some degenerative change noted visualized thoracic spine with a small lucency T2 on the left, and some lucency within the pedicle at T3 on the same side. SOFT TISSUES: There is no prevertebral soft tissue swelling. Mucosal thickening visualized sphenoid sinus. No air-fluid level noted. Carotid bifurcation vascular calcifications. Emphysematous changes mid upper visualized lungs. No acute abnormality of the cervical spine. Advanced multilevel degenerative changes, most severe C5-C7 with associated findings, as above. Emphysema/ COPD. Nonacute appearing sphenoid sinus disease. Several small bony lucencies left T2 vertebral body and left pedicle at T3 likely incidental/ related to some degree of osteopenia ; correlate clinically and obtain follow-up imaging as indicated. Ct Chest Pulmonary Embolism W Contrast    Result Date: 2/21/2018  EXAMINATION: CTA OF THE CHEST 2/21/2018 5:23 pm TECHNIQUE: CTA of the chest was performed after the administration of intravenous contrast.  Multiplanar reformatted images are provided for review. MIP images are provided for review. Dose modulation, iterative reconstruction, and/or weight based adjustment of the mA/kV was utilized to reduce the radiation dose to as low as reasonably achievable. COMPARISON: 11/10/2015 HISTORY: ORDERING SYSTEM PROVIDED HISTORY: sob, elevated d-dimer. A 68-year-old male with shortness of breath and elevated D-dimer FINDINGS: Pulmonary Arteries: No obvious filling defect identified within the visualized pulmonary arterial vasculature that meets the criteria for pulmonary embolus. Evaluation of the bibasilar segmental and subsegmental pulmonary arteries is limited due to respiratory motion.  Mediastinum:

## 2018-02-21 NOTE — ED NOTES
Pt placed on 2L NC. Pt on home O2 daily PRN and continuously at night.       Champ Vera RN  02/21/18 7350

## 2018-02-22 ENCOUNTER — APPOINTMENT (OUTPATIENT)
Dept: GENERAL RADIOLOGY | Age: 78
DRG: 871 | End: 2018-02-22
Payer: MEDICARE

## 2018-02-22 PROBLEM — D69.6 THROMBOCYTOPENIA (HCC): Status: ACTIVE | Noted: 2018-02-22

## 2018-02-22 LAB
-: ABNORMAL
ABSOLUTE BANDS #: 1.14 K/UL (ref 0–1)
ABSOLUTE EOS #: 0 K/UL (ref 0–0.4)
ABSOLUTE IMMATURE GRANULOCYTE: ABNORMAL K/UL (ref 0–0.3)
ABSOLUTE LYMPH #: 0.33 K/UL (ref 1–4.8)
ABSOLUTE MONO #: 1.14 K/UL (ref 0.1–1.3)
ADENOVIRUS PCR: NOT DETECTED
ALLEN TEST: NORMAL
AMORPHOUS: ABNORMAL
BACTERIA: ABNORMAL
BANDS: 7 % (ref 0–10)
BASOPHILS # BLD: 0 % (ref 0–2)
BASOPHILS ABSOLUTE: 0 K/UL (ref 0–0.2)
BILIRUBIN URINE: NEGATIVE
BORDETELLA PERTUSSIS PCR: NOT DETECTED
CARBOXYHEMOGLOBIN: 1.7 %
CASTS UA: ABNORMAL /LPF
CHLAMYDIA PNEUMONIAE BY PCR: NOT DETECTED
COLOR: YELLOW
COMMENT UA: ABNORMAL
CORONAVIRUS 229E PCR: NOT DETECTED
CORONAVIRUS HKU1 PCR: NOT DETECTED
CORONAVIRUS NL63 PCR: NOT DETECTED
CORONAVIRUS OC43 PCR: NOT DETECTED
CRYSTALS, UA: ABNORMAL /HPF
CULTURE: NORMAL
CULTURE: NORMAL
DIFFERENTIAL TYPE: ABNORMAL
DIRECT EXAM: NORMAL
EOSINOPHILS RELATIVE PERCENT: 0 % (ref 0–4)
EPITHELIAL CELLS UA: ABNORMAL /HPF
FIO2: NORMAL
GLUCOSE BLD-MCNC: 205 MG/DL (ref 75–110)
GLUCOSE BLD-MCNC: 226 MG/DL (ref 75–110)
GLUCOSE BLD-MCNC: 251 MG/DL (ref 75–110)
GLUCOSE BLD-MCNC: 252 MG/DL (ref 75–110)
GLUCOSE BLD-MCNC: 379 MG/DL (ref 75–110)
GLUCOSE URINE: ABNORMAL
HCO3 ARTERIAL: 25.8 MMOL/L
HCT VFR BLD CALC: 44.4 % (ref 41–53)
HEMOGLOBIN: 14.8 G/DL (ref 13.5–17.5)
HEPATITIS C ANTIBODY: NONREACTIVE
HUMAN METAPNEUMOVIRUS PCR: NOT DETECTED
IMMATURE GRANULOCYTES: ABNORMAL %
INFLUENZA A BY PCR: NOT DETECTED
INFLUENZA A H1 (2009) PCR: NORMAL
INFLUENZA A H1 PCR: NORMAL
INFLUENZA A H3 PCR: NORMAL
INFLUENZA B BY PCR: NOT DETECTED
KETONES, URINE: NEGATIVE
LACTIC ACID, SEPSIS WHOLE BLOOD: ABNORMAL MMOL/L (ref 0.5–1.9)
LACTIC ACID, SEPSIS: 5.8 MMOL/L (ref 0.5–1.9)
LACTIC ACID: 2.7 MMOL/L (ref 0.5–2.2)
LACTIC ACID: 2.7 MMOL/L (ref 0.5–2.2)
LACTIC ACID: 3.7 MMOL/L (ref 0.5–2.2)
LACTIC ACID: 4.5 MMOL/L (ref 0.5–2.2)
LEUKOCYTE ESTERASE, URINE: NEGATIVE
LV EF: 53 %
LVEF MODALITY: NORMAL
LYMPHOCYTES # BLD: 2 % (ref 24–44)
Lab: NORMAL
MAGNESIUM: 2.1 MG/DL (ref 1.6–2.6)
MCH RBC QN AUTO: 34 PG (ref 26–34)
MCHC RBC AUTO-ENTMCNC: 33.4 G/DL (ref 31–37)
MCV RBC AUTO: 101.6 FL (ref 80–100)
METAMYELOCYTES ABSOLUTE COUNT: 0.16 K/UL
METAMYELOCYTES: 1 %
METHEMOGLOBIN: 0.5 %
MODE: NORMAL
MONOCYTES # BLD: 7 % (ref 1–7)
MORPHOLOGY: ABNORMAL
MRSA, DNA, NASAL: NORMAL
MUCUS: ABNORMAL
MYCOPLASMA PNEUMONIAE PCR: NOT DETECTED
NEGATIVE BASE EXCESS, ART: NORMAL MMOL/L (ref 0–2)
NITRITE, URINE: NEGATIVE
NOTIFICATION TIME: NORMAL
NOTIFICATION: NORMAL
NRBC AUTOMATED: ABNORMAL PER 100 WBC
O2 DEVICE/FLOW/%: NORMAL
O2 SAT, ARTERIAL: 86.7 %
OTHER OBSERVATIONS UA: ABNORMAL
OXYHEMOGLOBIN: NORMAL % (ref 95–98)
PARAINFLUENZA 1 PCR: NOT DETECTED
PARAINFLUENZA 2 PCR: NOT DETECTED
PARAINFLUENZA 3 PCR: NOT DETECTED
PARAINFLUENZA 4 PCR: NOT DETECTED
PATIENT TEMP: 37
PCO2 ARTERIAL: 40.7 MMHG
PCO2, ART, TEMP ADJ: NORMAL
PDW BLD-RTO: 13.8 % (ref 11.5–14.9)
PEEP/CPAP: NORMAL
PH ARTERIAL: 7.41
PH UA: 5 (ref 5–8)
PH, ART, TEMP ADJ: NORMAL
PHOSPHORUS: 2.8 MG/DL (ref 2.5–4.5)
PLATELET # BLD: 105 K/UL (ref 150–450)
PLATELET ESTIMATE: ABNORMAL
PMV BLD AUTO: 9.3 FL (ref 6–12)
PO2 ARTERIAL: 52.6 MMHG
PO2, ART, TEMP ADJ: NORMAL MMHG
POSITIVE BASE EXCESS, ART: 1.2 MMOL/L (ref 0–2)
PROCALCITONIN: 0.58 NG/ML
PROTEIN UA: ABNORMAL
PSV: NORMAL
PT. POSITION: NORMAL
RBC # BLD: 4.37 M/UL (ref 4.5–5.9)
RBC # BLD: ABNORMAL 10*6/UL
RBC UA: ABNORMAL /HPF
RENAL EPITHELIAL, UA: ABNORMAL /HPF
RESP SYNCYTIAL VIRUS PCR: NOT DETECTED
RESPIRATORY RATE: 18
RHINO/ENTEROVIRUS PCR: NOT DETECTED
SAMPLE SITE: NORMAL
SEG NEUTROPHILS: 83 % (ref 36–66)
SEGMENTED NEUTROPHILS ABSOLUTE COUNT: 13.53 K/UL (ref 1.3–9.1)
SET RATE: NORMAL
SOURCE: NORMAL
SPECIFIC GRAVITY UA: 1.03 (ref 1–1.03)
SPECIMEN DESCRIPTION: NORMAL
STATUS: NORMAL
TEXT FOR RESPIRATORY: NORMAL
TOTAL HB: NORMAL G/DL (ref 12–16)
TOTAL RATE: NORMAL
TRICHOMONAS: ABNORMAL
TROPONIN INTERP: NORMAL
TROPONIN T: <0.03 NG/ML
TURBIDITY: ABNORMAL
URINE HGB: ABNORMAL
UROBILINOGEN, URINE: NORMAL
VT: NORMAL
WBC # BLD: 16.3 K/UL (ref 3.5–11)
WBC # BLD: ABNORMAL 10*3/UL
WBC UA: ABNORMAL /HPF
YEAST: ABNORMAL

## 2018-02-22 PROCEDURE — 6370000000 HC RX 637 (ALT 250 FOR IP): Performed by: STUDENT IN AN ORGANIZED HEALTH CARE EDUCATION/TRAINING PROGRAM

## 2018-02-22 PROCEDURE — 87798 DETECT AGENT NOS DNA AMP: CPT

## 2018-02-22 PROCEDURE — 81001 URINALYSIS AUTO W/SCOPE: CPT

## 2018-02-22 PROCEDURE — 36415 COLL VENOUS BLD VENIPUNCTURE: CPT

## 2018-02-22 PROCEDURE — 94664 DEMO&/EVAL PT USE INHALER: CPT

## 2018-02-22 PROCEDURE — 87070 CULTURE OTHR SPECIMN AEROBIC: CPT

## 2018-02-22 PROCEDURE — 87147 CULTURE TYPE IMMUNOLOGIC: CPT

## 2018-02-22 PROCEDURE — 6370000000 HC RX 637 (ALT 250 FOR IP): Performed by: INTERNAL MEDICINE

## 2018-02-22 PROCEDURE — 6360000002 HC RX W HCPCS: Performed by: RADIOLOGY

## 2018-02-22 PROCEDURE — 82947 ASSAY GLUCOSE BLOOD QUANT: CPT

## 2018-02-22 PROCEDURE — 87581 M.PNEUMON DNA AMP PROBE: CPT

## 2018-02-22 PROCEDURE — 87633 RESP VIRUS 12-25 TARGETS: CPT

## 2018-02-22 PROCEDURE — 82805 BLOOD GASES W/O2 SATURATION: CPT

## 2018-02-22 PROCEDURE — 2580000003 HC RX 258: Performed by: RADIOLOGY

## 2018-02-22 PROCEDURE — 87804 INFLUENZA ASSAY W/OPTIC: CPT

## 2018-02-22 PROCEDURE — 93306 TTE W/DOPPLER COMPLETE: CPT

## 2018-02-22 PROCEDURE — 51702 INSERT TEMP BLADDER CATH: CPT

## 2018-02-22 PROCEDURE — 2580000003 HC RX 258: Performed by: FAMILY MEDICINE

## 2018-02-22 PROCEDURE — 83605 ASSAY OF LACTIC ACID: CPT

## 2018-02-22 PROCEDURE — 87486 CHLMYD PNEUM DNA AMP PROBE: CPT

## 2018-02-22 PROCEDURE — 87205 SMEAR GRAM STAIN: CPT

## 2018-02-22 PROCEDURE — 87040 BLOOD CULTURE FOR BACTERIA: CPT

## 2018-02-22 PROCEDURE — 83735 ASSAY OF MAGNESIUM: CPT

## 2018-02-22 PROCEDURE — 85025 COMPLETE CBC W/AUTO DIFF WBC: CPT

## 2018-02-22 PROCEDURE — 87641 MR-STAPH DNA AMP PROBE: CPT

## 2018-02-22 PROCEDURE — 94640 AIRWAY INHALATION TREATMENT: CPT

## 2018-02-22 PROCEDURE — 2580000003 HC RX 258: Performed by: STUDENT IN AN ORGANIZED HEALTH CARE EDUCATION/TRAINING PROGRAM

## 2018-02-22 PROCEDURE — 71045 X-RAY EXAM CHEST 1 VIEW: CPT

## 2018-02-22 PROCEDURE — 86403 PARTICLE AGGLUT ANTBDY SCRN: CPT

## 2018-02-22 PROCEDURE — 86803 HEPATITIS C AB TEST: CPT

## 2018-02-22 PROCEDURE — 99222 1ST HOSP IP/OBS MODERATE 55: CPT | Performed by: INTERNAL MEDICINE

## 2018-02-22 PROCEDURE — 6360000002 HC RX W HCPCS: Performed by: INTERNAL MEDICINE

## 2018-02-22 PROCEDURE — 51798 US URINE CAPACITY MEASURE: CPT

## 2018-02-22 PROCEDURE — 94761 N-INVAS EAR/PLS OXIMETRY MLT: CPT

## 2018-02-22 PROCEDURE — 93005 ELECTROCARDIOGRAM TRACING: CPT

## 2018-02-22 PROCEDURE — 94762 N-INVAS EAR/PLS OXIMTRY CONT: CPT

## 2018-02-22 PROCEDURE — 99291 CRITICAL CARE FIRST HOUR: CPT | Performed by: INTERNAL MEDICINE

## 2018-02-22 PROCEDURE — 6360000002 HC RX W HCPCS: Performed by: STUDENT IN AN ORGANIZED HEALTH CARE EDUCATION/TRAINING PROGRAM

## 2018-02-22 PROCEDURE — 2060000000 HC ICU INTERMEDIATE R&B

## 2018-02-22 PROCEDURE — 6360000002 HC RX W HCPCS: Performed by: FAMILY MEDICINE

## 2018-02-22 PROCEDURE — 36600 WITHDRAWAL OF ARTERIAL BLOOD: CPT

## 2018-02-22 PROCEDURE — 73070 X-RAY EXAM OF ELBOW: CPT

## 2018-02-22 PROCEDURE — 87186 SC STD MICRODIL/AGAR DIL: CPT

## 2018-02-22 PROCEDURE — 84100 ASSAY OF PHOSPHORUS: CPT

## 2018-02-22 PROCEDURE — 84145 PROCALCITONIN (PCT): CPT

## 2018-02-22 RX ORDER — ONDANSETRON 2 MG/ML
4 INJECTION INTRAMUSCULAR; INTRAVENOUS EVERY 6 HOURS PRN
Status: DISCONTINUED | OUTPATIENT
Start: 2018-02-22 | End: 2018-03-20 | Stop reason: HOSPADM

## 2018-02-22 RX ORDER — LEVOFLOXACIN 5 MG/ML
500 INJECTION, SOLUTION INTRAVENOUS EVERY 24 HOURS
Status: DISCONTINUED | OUTPATIENT
Start: 2018-02-22 | End: 2018-02-23

## 2018-02-22 RX ORDER — METHYLPREDNISOLONE SODIUM SUCCINATE 40 MG/ML
40 INJECTION, POWDER, LYOPHILIZED, FOR SOLUTION INTRAMUSCULAR; INTRAVENOUS EVERY 6 HOURS
Status: DISCONTINUED | OUTPATIENT
Start: 2018-02-22 | End: 2018-02-23

## 2018-02-22 RX ORDER — 0.9 % SODIUM CHLORIDE 0.9 %
1000 INTRAVENOUS SOLUTION INTRAVENOUS ONCE
Status: COMPLETED | OUTPATIENT
Start: 2018-02-22 | End: 2018-02-22

## 2018-02-22 RX ADMIN — MOMETASONE FUROATE AND FORMOTEROL FUMARATE DIHYDRATE 2 PUFF: 200; 5 AEROSOL RESPIRATORY (INHALATION) at 20:25

## 2018-02-22 RX ADMIN — IPRATROPIUM BROMIDE AND ALBUTEROL SULFATE 1 AMPULE: .5; 3 SOLUTION RESPIRATORY (INHALATION) at 12:09

## 2018-02-22 RX ADMIN — LISINOPRIL 20 MG: 20 TABLET ORAL at 09:15

## 2018-02-22 RX ADMIN — SODIUM CHLORIDE: 9 INJECTION, SOLUTION INTRAVENOUS at 17:58

## 2018-02-22 RX ADMIN — INSULIN LISPRO 4 UNITS: 100 INJECTION, SOLUTION INTRAVENOUS; SUBCUTANEOUS at 17:14

## 2018-02-22 RX ADMIN — IPRATROPIUM BROMIDE AND ALBUTEROL SULFATE 1 AMPULE: .5; 3 SOLUTION RESPIRATORY (INHALATION) at 20:28

## 2018-02-22 RX ADMIN — SODIUM CHLORIDE: 9 INJECTION, SOLUTION INTRAVENOUS at 23:55

## 2018-02-22 RX ADMIN — TAMSULOSIN HYDROCHLORIDE 0.4 MG: 0.4 CAPSULE ORAL at 09:15

## 2018-02-22 RX ADMIN — SIMVASTATIN 20 MG: 20 TABLET, FILM COATED ORAL at 20:25

## 2018-02-22 RX ADMIN — METHYLPREDNISOLONE SODIUM SUCCINATE 40 MG: 40 INJECTION, POWDER, FOR SOLUTION INTRAMUSCULAR; INTRAVENOUS at 13:37

## 2018-02-22 RX ADMIN — MOMETASONE FUROATE AND FORMOTEROL FUMARATE DIHYDRATE 2 PUFF: 200; 5 AEROSOL RESPIRATORY (INHALATION) at 09:15

## 2018-02-22 RX ADMIN — VANCOMYCIN HYDROCHLORIDE 1250 MG: 1 INJECTION, POWDER, LYOPHILIZED, FOR SOLUTION INTRAVENOUS at 10:30

## 2018-02-22 RX ADMIN — IPRATROPIUM BROMIDE AND ALBUTEROL SULFATE 1 AMPULE: .5; 3 SOLUTION RESPIRATORY (INHALATION) at 15:29

## 2018-02-22 RX ADMIN — INSULIN LISPRO 3 UNITS: 100 INJECTION, SOLUTION INTRAVENOUS; SUBCUTANEOUS at 22:32

## 2018-02-22 RX ADMIN — FAMOTIDINE 20 MG: 20 TABLET, FILM COATED ORAL at 09:15

## 2018-02-22 RX ADMIN — LEVOFLOXACIN 500 MG: 5 INJECTION, SOLUTION INTRAVENOUS at 13:36

## 2018-02-22 RX ADMIN — PIPERACILLIN SODIUM,TAZOBACTAM SODIUM 3.38 G: 3; .375 INJECTION, POWDER, FOR SOLUTION INTRAVENOUS at 06:11

## 2018-02-22 RX ADMIN — INSULIN LISPRO 6 UNITS: 100 INJECTION, SOLUTION INTRAVENOUS; SUBCUTANEOUS at 09:10

## 2018-02-22 RX ADMIN — FAMOTIDINE 20 MG: 20 TABLET, FILM COATED ORAL at 20:25

## 2018-02-22 RX ADMIN — ENOXAPARIN SODIUM 40 MG: 40 INJECTION SUBCUTANEOUS at 09:15

## 2018-02-22 RX ADMIN — INSULIN LISPRO 4 UNITS: 100 INJECTION, SOLUTION INTRAVENOUS; SUBCUTANEOUS at 13:36

## 2018-02-22 RX ADMIN — SODIUM CHLORIDE 1000 ML: 9 INJECTION, SOLUTION INTRAVENOUS at 09:09

## 2018-02-22 RX ADMIN — ACETAMINOPHEN 650 MG: 325 TABLET, FILM COATED ORAL at 20:25

## 2018-02-22 RX ADMIN — FINASTERIDE 5 MG: 5 TABLET, FILM COATED ORAL at 09:15

## 2018-02-22 RX ADMIN — METHYLPREDNISOLONE SODIUM SUCCINATE 40 MG: 40 INJECTION, POWDER, FOR SOLUTION INTRAMUSCULAR; INTRAVENOUS at 19:32

## 2018-02-22 RX ADMIN — Medication 20 UNITS: at 22:32

## 2018-02-22 RX ADMIN — ROPINIROLE HYDROCHLORIDE 2 MG: 2 TABLET, FILM COATED ORAL at 09:17

## 2018-02-22 RX ADMIN — CITALOPRAM HYDROBROMIDE 20 MG: 20 TABLET ORAL at 09:15

## 2018-02-22 RX ADMIN — SODIUM CHLORIDE: 9 INJECTION, SOLUTION INTRAVENOUS at 11:08

## 2018-02-22 RX ADMIN — IPRATROPIUM BROMIDE AND ALBUTEROL SULFATE 1 AMPULE: .5; 3 SOLUTION RESPIRATORY (INHALATION) at 07:04

## 2018-02-22 RX ADMIN — Medication 10 ML: at 09:17

## 2018-02-22 ASSESSMENT — ENCOUNTER SYMPTOMS
DIARRHEA: 0
ABDOMINAL PAIN: 0
NAUSEA: 1
DOUBLE VISION: 0
VOMITING: 0
COUGH: 1
BLURRED VISION: 0
SHORTNESS OF BREATH: 0
CONSTIPATION: 0

## 2018-02-22 ASSESSMENT — PAIN DESCRIPTION - LOCATION: LOCATION: BACK

## 2018-02-22 ASSESSMENT — PAIN DESCRIPTION - ORIENTATION: ORIENTATION: LEFT

## 2018-02-22 ASSESSMENT — PAIN SCALES - GENERAL
PAINLEVEL_OUTOF10: 2
PAINLEVEL_OUTOF10: 5
PAINLEVEL_OUTOF10: 0
PAINLEVEL_OUTOF10: 5

## 2018-02-22 ASSESSMENT — PAIN DESCRIPTION - FREQUENCY: FREQUENCY: INTERMITTENT

## 2018-02-22 ASSESSMENT — PAIN DESCRIPTION - PAIN TYPE: TYPE: ACUTE PAIN

## 2018-02-22 NOTE — PLAN OF CARE
Problem: Falls - Risk of  Goal: Absence of falls  Outcome: Ongoing  No falls noted this shift. Patient ambulates with x1 staff assistance without difficulty. Bed kept in low position. Safe environment maintained. Bedside table & call light in reach. Uses call light appropriately when needing assistance. Problem: OXYGENATION/RESPIRATORY FUNCTION  Goal: Patient will maintain patent airway  Outcome: Ongoing  Pt maintaining regular RR and function with some mild dyspnea on exertion. Continue to monitor.

## 2018-02-22 NOTE — CONSULTS
02/22/2018  5:35 AM [address] for Influenza A + B antigens. Assessment:   Staph aureus bacteremia ,possible pulmonary source r/o valvular vegetationS    Sepsis secondary to above   Active Problems:    COPD (chronic obstructive pulmonary disease) (HCC)    Hyperlipidemia    Hypertension    Diabetes mellitus type 2, insulin dependent (HCC)    Thrombocytopenia ([facility] [address]  H/o Bladder CA  Recommendations:   Cont IV Vancomycin for now   D/C Zosyn start Levaquin . Cardiology consult for LUIS   Follow lactic acid   Follow Pro calcitonin level and nasal swab for MRSA   Hold Celexa while on Levaquin due to the risk of QT prolongation . Thank you for allowing me to participate in the care of your patient. Please feel free to contact me with any questions or concerns.      Jose M Pena MD

## 2018-02-22 NOTE — CONSULTS
lb 14.7 oz (86.6 kg)   SpO2 94%   BMI 29.90 kg/m²   24HR INTAKE/OUTPUT:    Intake/Output Summary (Last 24 hours) at 18 1153  Last data filed at 18 0537   Gross per 24 hour   Intake          3626.28 ml   Output             1100 ml   Net          2526.28 ml     CURRENT PULSE OXIMETRY:  SpO2: 94 %  24HR PULSE OXIMETRY RANGE:  SpO2  Av.5 %  Min: 87 %  Max: 96 % on Venturi mask    Vent Settings:     / / /   PEEP  Recent Labs      18   0840   PHART  7.410   EST5TJF  40.7   PO2ART  52.6         CONSTITUTIONAL:  Alert  LUNGS:  no increased work of breathing and clear to auscultation. No accessory muscle use  CARDIOVASCULAR:  normal S1 and S2, no edema and no JVD  ABDOMEN:  normal bowel sounds, non-distended and no masses palpated, and no tenderness to palpation. No hepatospleenomegaly  PSYCHIATRIC: Alert  MUSCULOSKELETAL: No obvious misalignment or effusion of the joints. No clubbing, cyanosis of the digits. SKIN:  normal skin color, texture, turgor and no redness, warmth, or swelling.  No palpable nodules      Current Medications:     vancomycin (VANCOCIN) intermittent dosing (placeholder)   Other RX Placeholder    vancomycin  1,250 mg Intravenous Q24H    tamsulosin  0.4 mg Oral Daily    rOPINIRole  2 mg Oral Daily    citalopram  20 mg Oral QAM    sodium chloride flush  10 mL Intravenous 2 times per day    enoxaparin  40 mg Subcutaneous Daily    piperacillin-tazobactam  3.375 g Intravenous Q8H    ipratropium-albuterol  1 ampule Inhalation Q4H WA    famotidine  20 mg Oral BID    insulin glargine  20 Units Subcutaneous Nightly    insulin lispro  0-12 Units Subcutaneous TID WC    insulin lispro  0-6 Units Subcutaneous Nightly    finasteride  5 mg Oral Daily    mometasone-formoterol  2 puff Inhalation BID    simvastatin  20 mg Oral Nightly    lisinopril  20 mg Oral Daily     Allergies:      IV:   sodium chloride 125 mL/hr at 18 1108    dextrose         DATA:    Old records have

## 2018-02-22 NOTE — FLOWSHEET NOTE
02/22/18 [id number]   Provider Notification   Reason for Communication Critical Value (comment)  [facility])   Provider Name Dr. Doc Rider   Provider Notification Resident   Method of Communication Call   Response See orders   Notification Time 24 [id number]     2/22/2018  7:36 AM - Alexys, pn Incoming Lab Results From [facility] [employer]     Component Results     Component Collected Lab   Specimen Description 02/21/2018  3:22  [address] [facility]   . BLOOD 2ML RED 8ML PURP LEFT FOREARM Performed at [address]    Specimen Description 02/21/2018  3:22  [facility] [address] [place], [address] [place] [address] [phone]    Special Requests 02/21/2018  3:22  [address] [facility]   NOT REPORTED    Culture  (Abnormal) 02/21/2018  3:22 PM TRINY Stroud NOTIFIED: Lily England., 2/22/18, 07:36. Culture  (Abnormal) 02/21/2018  3:22  [address]   DIRECT GRAM STAIN FROM BOTTLE: GRAM POSITIVE COCCI IN CLUSTERS     Culture  (Abnormal) 02/21/2018  3:22  [address]   ID by [facility]: STAPHYLOCOCCUS AUREUS     Culture 02/21/2018  3:22  [address]   Performed at [address] [place] [address] Drive [address] [facility], [address] [place] [address] [phone]    Status 02/21/2018  3:22  [address]   Pending        2/22/2018  7:38 AM - Alexys, pn Incoming Lab Results From [facility] [employer]     Component Results     Component Collected Lab   Specimen Description 02/21/2018  5:15  [address] [facility]   . BLOOD RT FOREARM Performed at [facility]: NARAYAN SALDANA [address]     Specimen Description 02/21/2018  5:15 PM [address], [address] [facility] [phone]    Special Requests 02/21/2018  5:15  [address] [facility]   NOT REPORTED    Culture  (Abnormal) 02/21/2018  5:15 PM [address] BLOOD CULTURE, RN NOTIFIED: Lily England., 2/22/18, 07:37     Culture  (Abnormal)

## 2018-02-22 NOTE — PROGRESS NOTES
Breath Sounds: Diminished  RR[de-identified] 18  Pulse Sat: 96% on 3 L n/c  Home Meds: Albuterol MDI PRN, Advair    · Bronchodilator assessment at level: 1  · []    Home Level  BRONCHODILATOR ASSESSMENT SCORE  Score 0 1 2 3 4 5   Breath Sounds   [x]  Patient Baseline [x]  No Wheeze good aeration []  Faint, scattered wheezing, good aeration []  Expiratory Wheezing and or moderately diminished []  Insp/Exp wheeze and/or very diminished []  Insp/Exp and/ or marked distress   Respiratory Rate   []  Patient Baseline [x]  Less than 20 []  Less than 20 []  20-25 []  Greater than 25 []  Greater than 25   Peak flow % of Pred or PB [x]  NA   []  Greater than 90%  []  81-90% []  71-80% []  Less than or equal to 70%  or unable to perform []  Unable due to Respiratory Distress   Dyspnea re []  Patient Baseline [x]  No SOB []  No SOB []  SOB on exertion []  SOB min activity []  At rest/acute   e FEV% Predicted       [x]  NA []  Above 69%  []  Unable []  Above 60-69%  []  Unable []  Above 50-59%  []  Unable []  Above 35-49%  []  Unable []  Less than 35%  []  Unable

## 2018-02-22 NOTE — PROGRESS NOTES
[facility]    Progress Note    2/22/2018    9:03 AM    Name:   Khushbu Roque  MRN:     [id number]     Acct:      [de-identified]   Room:   [address] [place] [address] Day:  1  Admit Date:  2/21/2018  2:48 PM    PCP:   Sourav Spain DO  Code Status:  Full Code    Subjective:     C/C:   Chief Complaint   Patient presents with    Shortness of Breath     Interval History Status: not changed. Patient seen and assessed at bedside. Overnight no acute events; low fever 99.3; PVCs noted on tele. Pt was found in the bathroom down this morning by nursing. Per patient he had to use the restroom and went by himself, fell from toilet onto floor without LOC or head injury, unable to get up. Dizziness and lightheadedness this morning, will obtain orthostatics. Oriented x3 this morning, but inconsistent history on fall. Telesitter placed. Blood cultures (+) S aureus. No history of MRSA; no clear radiographic evidence of pneumonia. Started on vancomycin. Vitals stable. Stat ABG 7.41/40.7/52.6/25.8 on 3.5L. Brief History:     The patient is a 76 y.o. Non-/non  male who presents with Shortness of Breath and he is admitted to the hospital for the management of SOB and lightheadedness. Hx COPD, DM II, HTN, HLD; hx nephrolithiasis and bladder cysts with chronic UTIs per family. Per patient felt lightheaded while using the bathroom, dizziness without syncope, fell from toilet today and was unable to get up. No LOC. Low fever and chills for past 1-2 days. Rhinorrhea and congestion. Nausea without emesis today. Chronic SOB, on 2L home O2 daytime with exercise and nighttime, but patient only using at nighttime. Baseline sputum production, usually unable to bring up. No chest pain. No abd pain. No flank pain. Per patient no change in fluid intake or urination, but per family patient not keeping hydrated. No diarrhea/constipation.     Review of Systems: Value Date/Time    SPECIAL NOT REPORTED 02/22/2018 05:35 AM     Lab Results   Component Value Date/Time    CULTURE CULTURE IN PROGRESS 02/21/2018 05:50 PM    CULTURE  02/21/2018 05:50 PM     Performed at [facility] [id number] [place] [address], [address] [place] [address] [phone]       [facility]    Radiology:    Xr Chest Standard (2 Vw)    Result Date: 2/21/2018  EXAMINATION: TWO VIEWS OF THE CHEST 2/21/2018 4:08 pm COMPARISON: Two-view chest from 03/24/2017 HISTORY: ORDERING SYSTEM PROVIDED HISTORY: fall TECHNOLOGIST PROVIDED HISTORY: Reason for exam:->fall Ordering Physician Provided Reason for Exam: fall Acuity: Acute Type of Exam: Initial Mechanism of Injury: Pt states that he got dizzy and fell off toilet today. Left hip discomfort Relevant Medical/Surgical History: Pt states that he got dizzy and fell off toilet today. Left hip discomfort Additional history of hypertension, bladder cancer, gastroesophageal reflux disease, asthma, tobacco abuse, chronic obstructive pulmonary disease and respiratory failure, and multiple drug resistant organisms. FINDINGS: Overlying ECG monitor leads. Mildly enlarged but stable appearing cardiac silhouette. Uncoiled and calcified thoracic aorta, similar by comparison. Mild basilar atelectasis or scarring, best seen right lateral base. No consolidation or sizable pleural effusion. No pneumothorax. No obvious rib fracture. Some loss mid thoracic vertebral body height of indeterminate age. Mild kyphoscoliosis thoracic spine with some osteopenia and DJD. Clips status post cholecystectomy. No acute cardiopulmonary disease. Bibasilar atelectasis or scarring. Some loss mid thoracic vertebral body height, possibly chronic. Correlate clinically.      Xr Pelvis (1-2 Views)    Result Date: 2/21/2018  EXAMINATION: SINGLE VIEW OF THE PELVIS 2/21/2018 4:09 pm COMPARISON: CT angiography of the abdomen and pelvis dated 07/19/2011 HISTORY: ORDERING SYSTEM PROVIDED HISTORY: fall TECHNOLOGIST diameter of 2.6-2.9 cm meeting criteria for AAA (>50% of proximal normal segment). Reference: J Vasc Surg. 2009 Oct;50(4 Suppl):S2-49         Physical Examination:        Physical Exam   Constitutional: He is oriented to person, place, and time. No distress. Overweight gentleman sitting in bed, uncomfortable but in no acute distress   HENT:   Head: Normocephalic and atraumatic. Eyes: Right eye exhibits no discharge. Left eye exhibits no discharge. Cardiovascular: Intact distal pulses. Distant heart sounds   Pulmonary/Chest: Effort normal. No respiratory distress. He has no wheezes. Distant heart sounds without appreciable wheezes   Abdominal: Soft. There is no tenderness (No flank pain, no suprapubic tenderness). There is no rebound and no guarding. Musculoskeletal: He exhibits no edema. Neurological: He is alert and oriented to person, place, and time. Skin: Skin is warm. He is not diaphoretic.        Assessment:        Primary Problem  Sepsis [facility])    Active Hospital Problems    Diagnosis Date Noted    Thrombocytopenia ([facility] [D69.6] 02/22/2018    Sepsis ([facility] [A41.9] 02/21/2018    Diabetes mellitus type 2, insulin dependent ([facility] [E11.9, Z79.4] 02/21/2018    Hypertension [I10]     Hyperlipidemia [E78.5]     COPD (chronic obstructive pulmonary disease) ([facility] [J44.9]        Plan:        Sepsis, 2/2 UTI vs pneumonia  - Hx of bladder CA with cyst removals, per pt and family hx of UTIs  - Chest radiograph without acute pathology; CT without evidence of PE  - 2D echo pending  - (-) influenza  - Pending Strep pneumo, Mycoplasma, Legionella  - Blood cultures (+) S aureus, without clear etiology  - ID consulted  - Lactic acid 4.1 -> 2.4 -> 3.7  - Sputum culture, urine culture pending  - On vancomycin, Zosyn  - Will reassess patient this am     Acute on chronic hypoxic respiratory failure  - COPD on home O2  - RT treat and eval  - Duonebs  - ABG this am 7.41/40.7/52.6/25.8 on 3.5L NC    S/p fall  -

## 2018-02-22 NOTE — FLOWSHEET NOTE
02/22/18 1346   Provider Notification   Reason for Communication Review case   Provider Name Dr. Hang Dozier   Provider Notification Resident   Method of Communication Call   Response See orders   Notification Time [phone]     Updated regarding the patient's bladder scan amount, as well as the patient's request for an indwelling Castaneda catheter to be placed. Awaiting orders.

## 2018-02-22 NOTE — FLOWSHEET NOTE
02/22/18 1245   Provider Notification   Reason for Communication New orders   Provider Name Dr. Ruddy Valdivia   Provider Notification Physician   Method of Communication Face to face   Response At bedside   Notification Time [phone]     Dr. Ruddy Valdivia at bedside. Orders received. Please see orders.

## 2018-02-23 LAB
ABSOLUTE BANDS #: 0.62 K/UL (ref 0–1)
ABSOLUTE EOS #: 0 K/UL (ref 0–0.4)
ABSOLUTE IMMATURE GRANULOCYTE: ABNORMAL K/UL (ref 0–0.3)
ABSOLUTE LYMPH #: 0.37 K/UL (ref 1–4.8)
ABSOLUTE MONO #: 0.37 K/UL (ref 0.1–1.3)
ANION GAP SERPL CALCULATED.3IONS-SCNC: 13 MMOL/L (ref 9–17)
BANDS: 5 % (ref 0–10)
BASOPHILS # BLD: 0 % (ref 0–2)
BASOPHILS ABSOLUTE: 0 K/UL (ref 0–0.2)
BUN BLDV-MCNC: 24 MG/DL (ref 8–23)
BUN/CREAT BLD: ABNORMAL (ref 9–20)
C-REACTIVE PROTEIN: 127.7 MG/L (ref 0–5)
CALCIUM SERPL-MCNC: 8 MG/DL (ref 8.6–10.4)
CHLORIDE BLD-SCNC: 101 MMOL/L (ref 98–107)
CO2: 22 MMOL/L (ref 20–31)
CREAT SERPL-MCNC: 0.91 MG/DL (ref 0.7–1.2)
CULTURE: ABNORMAL
DIFFERENTIAL TYPE: ABNORMAL
EOSINOPHILS RELATIVE PERCENT: 0 % (ref 0–4)
GFR AFRICAN AMERICAN: >60 ML/MIN
GFR NON-AFRICAN AMERICAN: >60 ML/MIN
GFR SERPL CREATININE-BSD FRML MDRD: ABNORMAL ML/MIN/{1.73_M2}
GFR SERPL CREATININE-BSD FRML MDRD: ABNORMAL ML/MIN/{1.73_M2}
GLUCOSE BLD-MCNC: 248 MG/DL (ref 75–110)
GLUCOSE BLD-MCNC: 254 MG/DL (ref 70–99)
GLUCOSE BLD-MCNC: 254 MG/DL (ref 75–110)
GLUCOSE BLD-MCNC: 271 MG/DL (ref 75–110)
GLUCOSE BLD-MCNC: 326 MG/DL (ref 75–110)
HCT VFR BLD CALC: 37.5 % (ref 41–53)
HEMOGLOBIN: 12.8 G/DL (ref 13.5–17.5)
IMMATURE GRANULOCYTES: ABNORMAL %
INR BLD: 1.3
LACTIC ACID: 3.2 MMOL/L (ref 0.5–2.2)
LACTIC ACID: 3.9 MMOL/L (ref 0.5–2.2)
LYMPHOCYTES # BLD: 3 % (ref 24–44)
Lab: ABNORMAL
Lab: ABNORMAL
MCH RBC QN AUTO: 33.4 PG (ref 26–34)
MCHC RBC AUTO-ENTMCNC: 34.1 G/DL (ref 31–37)
MCV RBC AUTO: 98.1 FL (ref 80–100)
MONOCYTES # BLD: 3 % (ref 1–7)
MORPHOLOGY: NORMAL
MYCOPLASMA PNEUMONIAE IGM: 0.36
NRBC AUTOMATED: ABNORMAL PER 100 WBC
ORGANISM: ABNORMAL
PARTIAL THROMBOPLASTIN TIME: 30.3 SEC (ref 23–31)
PDW BLD-RTO: 13.2 % (ref 11.5–14.9)
PLATELET # BLD: 97 K/UL (ref 150–450)
PLATELET ESTIMATE: ABNORMAL
PMV BLD AUTO: 9.4 FL (ref 6–12)
POTASSIUM SERPL-SCNC: 4.1 MMOL/L (ref 3.7–5.3)
PROTHROMBIN TIME: 13.4 SEC (ref 9.7–12)
RBC # BLD: 3.82 M/UL (ref 4.5–5.9)
RBC # BLD: ABNORMAL 10*6/UL
SEDIMENTATION RATE, ERYTHROCYTE: 43 MM (ref 0–15)
SEG NEUTROPHILS: 89 % (ref 36–66)
SEGMENTED NEUTROPHILS ABSOLUTE COUNT: 10.94 K/UL (ref 1.3–9.1)
SODIUM BLD-SCNC: 136 MMOL/L (ref 135–144)
SPECIMEN DESCRIPTION: ABNORMAL
STATUS: ABNORMAL
STATUS: ABNORMAL
WBC # BLD: 12.3 K/UL (ref 3.5–11)
WBC # BLD: ABNORMAL 10*3/UL

## 2018-02-23 PROCEDURE — 85651 RBC SED RATE NONAUTOMATED: CPT

## 2018-02-23 PROCEDURE — 36415 COLL VENOUS BLD VENIPUNCTURE: CPT

## 2018-02-23 PROCEDURE — 97166 OT EVAL MOD COMPLEX 45 MIN: CPT

## 2018-02-23 PROCEDURE — 6360000002 HC RX W HCPCS: Performed by: INTERNAL MEDICINE

## 2018-02-23 PROCEDURE — 6360000002 HC RX W HCPCS: Performed by: FAMILY MEDICINE

## 2018-02-23 PROCEDURE — G8978 MOBILITY CURRENT STATUS: HCPCS

## 2018-02-23 PROCEDURE — 85025 COMPLETE CBC W/AUTO DIFF WBC: CPT

## 2018-02-23 PROCEDURE — 6360000002 HC RX W HCPCS: Performed by: RADIOLOGY

## 2018-02-23 PROCEDURE — 86140 C-REACTIVE PROTEIN: CPT

## 2018-02-23 PROCEDURE — 82947 ASSAY GLUCOSE BLOOD QUANT: CPT

## 2018-02-23 PROCEDURE — 2580000003 HC RX 258: Performed by: FAMILY MEDICINE

## 2018-02-23 PROCEDURE — 80048 BASIC METABOLIC PNL TOTAL CA: CPT

## 2018-02-23 PROCEDURE — G8979 MOBILITY GOAL STATUS: HCPCS

## 2018-02-23 PROCEDURE — 85610 PROTHROMBIN TIME: CPT

## 2018-02-23 PROCEDURE — 83605 ASSAY OF LACTIC ACID: CPT

## 2018-02-23 PROCEDURE — 2580000003 HC RX 258: Performed by: STUDENT IN AN ORGANIZED HEALTH CARE EDUCATION/TRAINING PROGRAM

## 2018-02-23 PROCEDURE — 97530 THERAPEUTIC ACTIVITIES: CPT

## 2018-02-23 PROCEDURE — 6370000000 HC RX 637 (ALT 250 FOR IP): Performed by: STUDENT IN AN ORGANIZED HEALTH CARE EDUCATION/TRAINING PROGRAM

## 2018-02-23 PROCEDURE — 94762 N-INVAS EAR/PLS OXIMTRY CONT: CPT

## 2018-02-23 PROCEDURE — 94640 AIRWAY INHALATION TREATMENT: CPT

## 2018-02-23 PROCEDURE — 99233 SBSQ HOSP IP/OBS HIGH 50: CPT | Performed by: INTERNAL MEDICINE

## 2018-02-23 PROCEDURE — 6370000000 HC RX 637 (ALT 250 FOR IP): Performed by: INTERNAL MEDICINE

## 2018-02-23 PROCEDURE — 2000000000 HC ICU R&B

## 2018-02-23 PROCEDURE — 2580000003 HC RX 258: Performed by: RADIOLOGY

## 2018-02-23 PROCEDURE — 82140 ASSAY OF AMMONIA: CPT

## 2018-02-23 PROCEDURE — 97535 SELF CARE MNGMENT TRAINING: CPT

## 2018-02-23 PROCEDURE — 97162 PT EVAL MOD COMPLEX 30 MIN: CPT

## 2018-02-23 PROCEDURE — 85730 THROMBOPLASTIN TIME PARTIAL: CPT

## 2018-02-23 PROCEDURE — 6360000002 HC RX W HCPCS: Performed by: STUDENT IN AN ORGANIZED HEALTH CARE EDUCATION/TRAINING PROGRAM

## 2018-02-23 RX ORDER — HALOPERIDOL 5 MG/ML
2 INJECTION INTRAMUSCULAR ONCE
Status: COMPLETED | OUTPATIENT
Start: 2018-02-23 | End: 2018-02-23

## 2018-02-23 RX ORDER — METHYLPREDNISOLONE SODIUM SUCCINATE 40 MG/ML
30 INJECTION, POWDER, LYOPHILIZED, FOR SOLUTION INTRAMUSCULAR; INTRAVENOUS EVERY 8 HOURS
Status: DISCONTINUED | OUTPATIENT
Start: 2018-02-24 | End: 2018-02-24

## 2018-02-23 RX ADMIN — MOMETASONE FUROATE AND FORMOTEROL FUMARATE DIHYDRATE 2 PUFF: 200; 5 AEROSOL RESPIRATORY (INHALATION) at 08:07

## 2018-02-23 RX ADMIN — IPRATROPIUM BROMIDE AND ALBUTEROL SULFATE 1 AMPULE: .5; 3 SOLUTION RESPIRATORY (INHALATION) at 21:28

## 2018-02-23 RX ADMIN — TAMSULOSIN HYDROCHLORIDE 0.4 MG: 0.4 CAPSULE ORAL at 10:18

## 2018-02-23 RX ADMIN — INSULIN LISPRO 6 UNITS: 100 INJECTION, SOLUTION INTRAVENOUS; SUBCUTANEOUS at 10:21

## 2018-02-23 RX ADMIN — SIMVASTATIN 20 MG: 20 TABLET, FILM COATED ORAL at 20:38

## 2018-02-23 RX ADMIN — ENOXAPARIN SODIUM 40 MG: 40 INJECTION SUBCUTANEOUS at 07:56

## 2018-02-23 RX ADMIN — METOPROLOL TARTRATE 12.5 MG: 25 TABLET ORAL at 20:38

## 2018-02-23 RX ADMIN — FINASTERIDE 5 MG: 5 TABLET, FILM COATED ORAL at 10:18

## 2018-02-23 RX ADMIN — INSULIN LISPRO 4 UNITS: 100 INJECTION, SOLUTION INTRAVENOUS; SUBCUTANEOUS at 17:28

## 2018-02-23 RX ADMIN — INSULIN LISPRO 6 UNITS: 100 INJECTION, SOLUTION INTRAVENOUS; SUBCUTANEOUS at 12:28

## 2018-02-23 RX ADMIN — METHYLPREDNISOLONE SODIUM SUCCINATE 40 MG: 40 INJECTION, POWDER, FOR SOLUTION INTRAMUSCULAR; INTRAVENOUS at 18:49

## 2018-02-23 RX ADMIN — INSULIN LISPRO 4 UNITS: 100 INJECTION, SOLUTION INTRAVENOUS; SUBCUTANEOUS at 21:38

## 2018-02-23 RX ADMIN — METOPROLOL TARTRATE 12.5 MG: 25 TABLET ORAL at 12:25

## 2018-02-23 RX ADMIN — ROPINIROLE HYDROCHLORIDE 2 MG: 2 TABLET, FILM COATED ORAL at 20:38

## 2018-02-23 RX ADMIN — Medication 10 ML: at 07:57

## 2018-02-23 RX ADMIN — METHYLPREDNISOLONE SODIUM SUCCINATE 40 MG: 40 INJECTION, POWDER, FOR SOLUTION INTRAMUSCULAR; INTRAVENOUS at 06:16

## 2018-02-23 RX ADMIN — METHYLPREDNISOLONE SODIUM SUCCINATE 40 MG: 40 INJECTION, POWDER, FOR SOLUTION INTRAMUSCULAR; INTRAVENOUS at 12:25

## 2018-02-23 RX ADMIN — FAMOTIDINE 20 MG: 20 TABLET, FILM COATED ORAL at 20:39

## 2018-02-23 RX ADMIN — LISINOPRIL 20 MG: 20 TABLET ORAL at 10:19

## 2018-02-23 RX ADMIN — HALOPERIDOL LACTATE 2 MG: 5 INJECTION, SOLUTION INTRAMUSCULAR at 23:15

## 2018-02-23 RX ADMIN — METHYLPREDNISOLONE SODIUM SUCCINATE 40 MG: 40 INJECTION, POWDER, FOR SOLUTION INTRAMUSCULAR; INTRAVENOUS at 00:46

## 2018-02-23 RX ADMIN — FAMOTIDINE 20 MG: 20 TABLET, FILM COATED ORAL at 10:18

## 2018-02-23 RX ADMIN — SODIUM CHLORIDE: 9 INJECTION, SOLUTION INTRAVENOUS at 17:33

## 2018-02-23 RX ADMIN — IPRATROPIUM BROMIDE AND ALBUTEROL SULFATE 1 AMPULE: .5; 3 SOLUTION RESPIRATORY (INHALATION) at 07:49

## 2018-02-23 RX ADMIN — VANCOMYCIN HYDROCHLORIDE 1250 MG: 1 INJECTION, POWDER, LYOPHILIZED, FOR SOLUTION INTRAVENOUS at 08:07

## 2018-02-23 RX ADMIN — IPRATROPIUM BROMIDE AND ALBUTEROL SULFATE 1 AMPULE: .5; 3 SOLUTION RESPIRATORY (INHALATION) at 16:35

## 2018-02-23 RX ADMIN — Medication 20 UNITS: at 21:38

## 2018-02-23 RX ADMIN — SODIUM CHLORIDE: 9 INJECTION, SOLUTION INTRAVENOUS at 06:28

## 2018-02-23 ASSESSMENT — ENCOUNTER SYMPTOMS
ABDOMINAL PAIN: 0
VOMITING: 0
DOUBLE VISION: 0
SHORTNESS OF BREATH: 0
CONSTIPATION: 0
BLURRED VISION: 0
NAUSEA: 0
DIARRHEA: 0
COUGH: 1

## 2018-02-23 ASSESSMENT — PAIN SCALES - GENERAL
PAINLEVEL_OUTOF10: 0
PAINLEVEL_OUTOF10: 0
PAINLEVEL_OUTOF10: 5
PAINLEVEL_OUTOF10: 2

## 2018-02-23 ASSESSMENT — PAIN DESCRIPTION - FREQUENCY
FREQUENCY: INTERMITTENT
FREQUENCY: INTERMITTENT

## 2018-02-23 ASSESSMENT — PAIN DESCRIPTION - PAIN TYPE
TYPE: ACUTE PAIN
TYPE: ACUTE PAIN

## 2018-02-23 ASSESSMENT — PAIN DESCRIPTION - LOCATION
LOCATION: BACK
LOCATION: BACK

## 2018-02-23 ASSESSMENT — PAIN DESCRIPTION - ORIENTATION
ORIENTATION: LEFT
ORIENTATION: LEFT

## 2018-02-23 NOTE — PROGRESS NOTES
Infectious disease Consult Note      Patient: Jessenia Real  : 1940  Acct#:  [id number]     Date:  2018    Subjective:       History of Present Illness  Patient is a 76 y.o.  male admitted with Sepsis ([facility] [A41.9] who is seen in consult for the same . The patient was brought to the ER after a fall at the bathroom with no loss consciousness . He had body ache ,fever and chills ,chronic cough and SOB, not feeling well for the last 2 days  . He lives at home with his wife with no recent travel or sick contact . Blood cultures on admission grew staph aureus . Lactic acid was elevated ,BP stable ,CT chest showed  Opacity in the right posterolateral trachea ,no PE. Echocardiogram showed no vegetation . He had H/O MRSA left elbow infection several years ago was treated at [facility] .  H/O DM   H/o Bladder CA ,UA unremarkable     Today   He denied fever or chills ,still coughing ,SOB better ,was up to the chair and feeling better . Past Medical History:   Diagnosis Date    Abdominal aortic aneurysm (AAA) ([facility]     small, post endovascular repair    Alveolar hypoventilation     on oxygen    Asthma     BCC (basal cell carcinoma of skin)     Bladder cancer (HCC)     BPH (benign prostatic hyperplasia)     Cataracts, bilateral     hx of    Chronic constipation     Chronic cor pulmonale (HCC)     on nocturnal oxygen    Chronic hypoxemic respiratory failure (HCC)     COPD (chronic obstructive pulmonary disease) (HCC)     stage II    Depression     Diverticulitis     GERD (gastroesophageal reflux disease)     Hard of hearing     both ears    History of kidney stones     passed on own years ago    History of nasal obstruction     History of smoking     Hoarseness     multifactorial    Hyperlipidemia     Hypertension     Hypertrophy of nasal turbinates     Irritable bowel     MDRO (multiple drug resistant organisms) resistance 2015    MRSA?     Mild

## 2018-02-23 NOTE — PROGRESS NOTES
Wears glasses for reading  Hearing: Exceptions to [facility]  Hearing Exceptions: Hard of hearing/hearing concerns (hearing aids are too expensive)     Subjective  General  Chart Reviewed: Yes  Patient assessed for rehabilitation services?: Yes  Response To Previous Treatment: Not applicable  Family / Caregiver Present: Yes  Referring Practitioner: Rory Villalobos  Referral Date : 02/21/18  Diagnosis: Sepsis  Follows Commands: Within Functional Limits  General Comment  Comments: Pt seen sitting at edge of bed and helped RN position pt in bed. Subjective  Subjective: Pt looked comfortable .   Pain Screening  Patient Currently in Pain: Yes  Pain Assessment  Pain Assessment: 0-10  Pain Level: 5  Pain Type: Acute pain  Pain Location: Back  Pain Orientation: Left  Pain Frequency: Intermittent  Vital Signs  Patient Currently in Pain: Yes  Pre Treatment Pain Screening  Pain at present: 5  Scale Used: Numeric Score  Intervention List: Patient able to continue with treatment    Orientation  Orientation  Overall Orientation Status: Within Normal Limits    Social/Functional History  Social/Functional History  Lives With: Spouse  Type of Home: House  Home Layout: Two level  Home Access: Stairs to enter with rails  Entrance Stairs - Number of Steps: 3  Entrance Stairs - Rails: Left  ADL Assistance: Independent  Ambulation Assistance: Independent  Active : No  Patient's  Info: Spouse drives  Mode of Transportation: Car  Additional Comments: Prior to present illness per pt's wife he was independent in ambulation, transfers and stair climbing without any assistive devices  Objective          AROM RLE (degrees)  RLE AROM: WFL  AROM LLE (degrees)  LLE AROM : WFL  AROM RUE (degrees)  RUE AROM : WFL  AROM LUE (degrees)  LUE AROM : WFL  Strength RLE  Strength RLE: WFL  Comment:  (grossly 3+/5)  Strength LLE  Strength LLE: WFL  Comment:  (grossly 3+/5)  Strength RUE  Strength RUE: WFL  Comment:  (grossly 4/5)  Strength LUE  Strength LUE:

## 2018-02-23 NOTE — PROGRESS NOTES
ICU Progress Note (Non-Vent)  [facility]    Patient - Jarrell Libman,  Age - 76 y.o.    - 1940      Room Number -    [facility] -  [id number]   [facility]  Date of Admission -  2018  2:48 PM    Events of Past 24 Hours   No acute events overnight. Vitals stable  Patient says his SOB is much improved,  Urinary catheter in place  Wants to eat    Vitals    height is 5' 7\" (1.702 m) and weight is 200 lb 9.9 oz (91 kg). His oral temperature is 98.2 °F (36.8 °C). His blood pressure is 160/78 (abnormal) and his pulse is 109. His respiration is 24 and oxygen saturation is 93%.        Temperature Range: Temp: 98.2 °F (36.8 °C) Temp  Av.6 °F (37 °C)  Min: 98.1 °F (36.7 °C)  Max: 99.5 °F (37.5 °C)  BP Range:  Systolic (57LBO), KMX:219 , Min:90 , ZWP:128     Diastolic (21ZKX), RMT:82, Min:26, Max:111    Pulse Range: Pulse  Av.7  Min: 78  Max: 115  Respiration Range: Resp  Av.3  Min: 15  Max: 33  Current Pulse Ox[de-identified]  SpO2: 93 %  24HR Pulse Ox Range:  SpO2  Av.2 %  Min: 85 %  Max: 96 %  Oxygen Amount and Delivery: O2 Flow Rate (L/min): 3 L/min    Wt Readings from Last 3 Encounters:   18 200 lb 9.9 oz (91 kg)   17 194 lb 9.6 oz (88.3 kg)   17 180 lb (81.6 kg)     I/O     Intake/Output Summary (Last 24 hours) at 18 1149  Last data filed at 18 1200   Gross per 24 hour   Intake             5038 ml   Output             1350 ml   Net             3688 ml     DRAIN/TUBE OUTPUT       Invasive Lines   ICP PRESSURE RANGE  No Data Recorded  CVP PRESSURE RANGE  No Data Recorded      Medications      metoprolol tartrate  12.5 mg Oral BID    vancomycin (VANCOCIN) intermittent dosing (placeholder)   Other RX Placeholder    vancomycin  1,250 mg Intravenous Q24H    methylPREDNISolone  40 mg Intravenous Q6H    tamsulosin  0.4 mg Oral Daily    rOPINIRole  2 mg Oral Daily    sodium chloride flush  10 mL Intravenous 2 times per day    enoxaparin  40 mg Subcutaneous Daily    ipratropium-albuterol  1 ampule Inhalation Q4H WA    famotidine  20 mg Oral BID    insulin glargine  20 Units Subcutaneous Nightly    insulin lispro  0-12 Units Subcutaneous TID WC    insulin lispro  0-6 Units Subcutaneous Nightly    finasteride  5 mg Oral Daily    mometasone-formoterol  2 puff Inhalation BID    simvastatin  20 mg Oral Nightly    lisinopril  20 mg Oral Daily     ondansetron, albuterol sulfate HFA, sodium chloride flush, acetaminophen, sodium phosphate IVPB **OR** sodium phosphate IVPB, potassium chloride **OR** potassium chloride **OR** potassium chloride, magnesium sulfate, glucose, dextrose, glucagon (rDNA), dextrose, albuterol  IV Drips/Infusions   sodium chloride 150 mL/hr at 02/23/18 8040    dextrose         Diet/Nutrition   DIET CARDIAC; Diet NPO, After Midnight    Exam      Constitutional - Alert, arousable  General Appearance  well developed, well nourished  HEENT -normocephalic, atraumatic. Lungs - Coarse breath sounds bilaterally. Mild wheezes, no crackles, or rhonchi  Cardiovascular - Heart sounds are normal.  normal rate and rhythm regular, no murmur, gallop or rub.   Abdomen - soft, nontender,  Skin - no bruising or bleeding  Extremities - no cyanosis, clubbing or     Lab Results   CBC   Lab Results   Component Value Date    WBC 12.3 02/23/2018    RBC 3.82 02/23/2018    HGB 12.8 02/23/2018    HCT 37.5 02/23/2018    PLT 97 02/23/2018    MCV 98.1 02/23/2018    MCH 33.4 02/23/2018    MCHC 34.1 02/23/2018    RDW 13.2 02/23/2018    METASPCT 1 02/22/2018    LYMPHOPCT 3 02/23/2018    MONOPCT 3 02/23/2018    BASOPCT 0 02/23/2018    MONOSABS 0.37 02/23/2018    LYMPHSABS 0.37 02/23/2018    EOSABS 0.00 02/23/2018    BASOSABS 0.00 02/23/2018    DIFFTYPE NOT REPORTED 02/23/2018       BMP Lab Results   Component Value Date     02/23/2018    K 4.1 02/23/2018

## 2018-02-23 NOTE — CONSULTS
complication, not stated as uncontrolled; and Wears glasses. Past Surgical History:   has a past surgical history that includes Cholecystectomy; Kidney stone surgery; back surgery; Bladder surgery; Cystourethroscopy/Urethral Dilation; Cataract removal (Right); Abdominal aortic aneurysm repair (06/26/2013); laryngoscopy; skin biopsy (2008); Skin cancer excision (Right, 10/18/2016); hernia repair; Elbow surgery (Left, 12/2015); Small intestine surgery; Nose surgery; cyst removal; and cystoscopy w biopsy of bladder (N/A, 4/28/2017). Home Medications:    Prior to Admission medications    Medication Sig Start Date End Date Taking? Authorizing Provider   finasteride (PROSCAR) 5 MG tablet TAKE 1 TABLET BY MOUTH DAILY 2/21/18  Yes Azam Carpio MD   glipiZIDE (GLUCOTROL) 10 MG tablet Take 1 tablet by mouth 2 times daily (before meals) 12/18/17  Yes Rafi Van, DO   rOPINIRole (REQUIP) 2 MG tablet TAKE ONE TABLET BY MOUTH EVERY EVENING AS DIRECTED 12/11/17 2/21/18 Yes Julian Renner, DO   insulin NPH (HUMULIN N KWIKPEN) 100 UNIT/ML injection pen Inject 30 Units into the skin 2 times daily (before meals) 11/20/17 3/28/18 Yes Rafi Van DO   lovastatin (MEVACOR) 40 MG tablet TAKE 1 TABLET BY MOUTH NIGHTLY 10/31/17 4/29/18 Yes Rafi Van DO   citalopram (CELEXA) 20 MG tablet TAKE 1 TABLET BY MOUTH EVERY MORNING 10/31/17  Yes Timo Weiss, DO   PROAIR  (90 BASE) MCG/ACT inhaler INHALE 2 PUFFS USING INHALER EVERY 4 HOURS AS NEEDED 4/25/16  Yes Julian Renner, DO   fluticasone-salmeterol (ADVAIR) 250-50 MCG/DOSE AEPB Inhale 1 puff into the lungs every 12 hours.   Patient taking differently: Inhale 1 puff into the lungs every 12 hours as needed  4/10/14  Yes Rafi Van DO   quinapril (ACCUPRIL) 40 MG tablet Take 1 tablet by mouth daily 11/20/17   Rafi Van DO   B-D UF III MINI PEN NEEDLES 31G X 5 MM MISC USE WITH INSULIN 4 TIMES DAILY 11/15/17   Timo Dc DO Abhishek       Allergies:  Ativan [lorazepam] and Codeine    Social History:   reports that he quit smoking about 2 years ago. His smoking use included Cigarettes. He has a 100.00 pack-year smoking history. He has never used smokeless tobacco. He reports that he does not drink alcohol or use drugs. Family History:   neg for early CAD    REVIEW OF SYSTEMS:    · Constitutional: there has been no unanticipated weight loss. There's been No change in energy level, No change in activity level. · Eyes: No visual changes or diplopia. No scleral icterus. · ENT: No Headaches, hearing loss or vertigo. No mouth sores or sore throat. · Cardiovascular: As HPI  · Respiratory: As HPI  · Gastrointestinal: No abdominal pain, appetite loss, blood in stools. No change in bowel or bladder habits. · Genitourinary: No dysuria, trouble voiding, or hematuria. · Musculoskeletal:  No gait disturbance, No weakness or joint complaints. · Integumentary: No rash or pruritis. · Neurological: No headache, diplopia, change in muscle strength, numbness or tingling. No change in gait, balance, coordination, mood, affect, memory, mentation, behavior. · Psychiatric: No anxiety, or depression. · Endocrine: No temperature intolerance. No excessive thirst, fluid intake, or urination. No tremor. · Hematologic/Lymphatic: No abnormal bruising or bleeding, blood clots or swollen lymph nodes. · Allergic/Immunologic: No nasal congestion or hives. PHYSICAL EXAM:    Physical Examination:    BP (!) 150/76   Pulse 108   Temp 98.2 °F (36.8 °C) (Oral)   Resp 15   Ht 5' 7\" (1.702 m)   Wt 200 lb 9.9 oz (91 kg)   SpO2 96%   BMI 31.42 kg/m²    Constitutional and General Appearance: alert, cooperative, no distress and appears stated age  HEENT: PERRL, no cervical lymphadenopathy. No masses palpable.  Normal oral mucosa  Respiratory:  · Normal excursion and expansion without use of accessory muscles  · Resp Auscultation: Good respiratory effort. No for increased work of breathing. On auscultation: Small crackles bibasilar  Cardiovascular:  · The apical impulse is not displaced  · Heart tones are crisp and normal. regular S1 and S2. Murmurs: None  · Jugular venous pulsation Normal  · The carotid upstroke is normal in amplitude and contour without delay or bruit  · Peripheral pulses are symmetrical and full   Abdomen:  · No masses or tenderness  · Bowel sounds present  Extremities:  ·  No Cyanosis or Clubbing  ·  Lower extremity edema: None  ·  Skin: Warm and dry  Neurological:  · Alert and oriented. · Moves all extremities well  · No abnormalities of mood, affect, memory, mentation, or behavior are noted    DATA:    Diagnostics:      EKG: NSR with APC and VPC , Ventricular triplet  Left axis deviation  Right bundle branch block  Inferior infarct , age undetermined    Labs:     CBC:   Recent Labs      02/22/18   0515  02/23/18   0530   WBC  16.3*  12.3*   HGB  14.8  12.8*   HCT  44.4  37.5*   PLT  105*  97*     BMP:   Recent Labs      02/21/18 1957 02/23/18   0530   NA  138  136   K  4.4  4.1   CO2  24  22   BUN  17  24*   CREATININE  0.87  0.91   LABGLOM  >60  >60   GLUCOSE  314*  254*     BNP: No results for input(s): BNP in the last 72 hours. PT/INR:   Recent Labs      02/21/18 1957 02/23/18   0824   PROTIME  14.5*  13.4*   INR  1.4  1.3     APTT:  Recent Labs      02/21/18   1522  02/23/18   0824   APTT  27.3  30.3     CARDIAC ENZYMES:  Recent Labs      02/21/18   1522   CKTOTAL  126      FASTING LIPID PANEL:  Lab Results   Component Value Date    HDL 38 07/12/2014    LDLCALC 59 07/12/2014    TRIG 190 07/12/2014     LIVER PROFILE:  Recent Labs      02/21/18   1522  02/21/18 1957   AST  24  24   ALT  29  27   LABALBU  3.9  3.6     Echo 2/22/18:   Left ventricle is normal in size. Mild left ventricular hypertrophy. Estimated LV EF 50-55 %. Right atrial dilatation. Right ventricular dilatation with reduced systolic  function.   Aortic

## 2018-02-24 ENCOUNTER — APPOINTMENT (OUTPATIENT)
Dept: GENERAL RADIOLOGY | Age: 78
DRG: 871 | End: 2018-02-24
Payer: MEDICARE

## 2018-02-24 ENCOUNTER — ANESTHESIA EVENT (OUTPATIENT)
Dept: ICU | Age: 78
DRG: 871 | End: 2018-02-24
Payer: MEDICARE

## 2018-02-24 ENCOUNTER — ANESTHESIA (OUTPATIENT)
Dept: ICU | Age: 78
DRG: 871 | End: 2018-02-24
Payer: MEDICARE

## 2018-02-24 LAB
ABSOLUTE EOS #: 0 K/UL (ref 0–0.4)
ABSOLUTE EOS #: 0 K/UL (ref 0–0.4)
ABSOLUTE IMMATURE GRANULOCYTE: ABNORMAL K/UL (ref 0–0.3)
ABSOLUTE IMMATURE GRANULOCYTE: ABNORMAL K/UL (ref 0–0.3)
ABSOLUTE LYMPH #: 0.36 K/UL (ref 1–4.8)
ABSOLUTE LYMPH #: 0.63 K/UL (ref 1–4.8)
ABSOLUTE MONO #: 0.59 K/UL (ref 0.1–1.3)
ABSOLUTE MONO #: 0.63 K/UL (ref 0.1–1.3)
ALLEN TEST: ABNORMAL
ALLEN TEST: NORMAL
ALLEN TEST: NORMAL
AMMONIA: 54 UMOL/L (ref 16–60)
ANION GAP SERPL CALCULATED.3IONS-SCNC: 15 MMOL/L (ref 9–17)
BASOPHILS # BLD: 0 % (ref 0–2)
BASOPHILS # BLD: 0 % (ref 0–2)
BASOPHILS ABSOLUTE: 0 K/UL (ref 0–0.2)
BASOPHILS ABSOLUTE: 0 K/UL (ref 0–0.2)
BUN BLDV-MCNC: 32 MG/DL (ref 8–23)
BUN/CREAT BLD: ABNORMAL (ref 9–20)
C-REACTIVE PROTEIN: 72.8 MG/L (ref 0–5)
CALCIUM SERPL-MCNC: 8.3 MG/DL (ref 8.6–10.4)
CARBOXYHEMOGLOBIN: 0.9 %
CARBOXYHEMOGLOBIN: 0.9 %
CARBOXYHEMOGLOBIN: 1.1 %
CHLORIDE BLD-SCNC: 101 MMOL/L (ref 98–107)
CO2: 21 MMOL/L (ref 20–31)
CREAT SERPL-MCNC: 0.96 MG/DL (ref 0.7–1.2)
DIFFERENTIAL TYPE: ABNORMAL
DIFFERENTIAL TYPE: ABNORMAL
EOSINOPHILS RELATIVE PERCENT: 0 % (ref 0–4)
EOSINOPHILS RELATIVE PERCENT: 0 % (ref 0–4)
FIO2: 50
FIO2: ABNORMAL
FIO2: NORMAL
GFR AFRICAN AMERICAN: >60 ML/MIN
GFR NON-AFRICAN AMERICAN: >60 ML/MIN
GFR SERPL CREATININE-BSD FRML MDRD: ABNORMAL ML/MIN/{1.73_M2}
GFR SERPL CREATININE-BSD FRML MDRD: ABNORMAL ML/MIN/{1.73_M2}
GLUCOSE BLD-MCNC: 195 MG/DL (ref 75–110)
GLUCOSE BLD-MCNC: 233 MG/DL (ref 75–110)
GLUCOSE BLD-MCNC: 264 MG/DL (ref 75–110)
GLUCOSE BLD-MCNC: 273 MG/DL (ref 70–99)
HCO3 ARTERIAL: 23 MMOL/L
HCO3 ARTERIAL: 24 MMOL/L
HCO3 ARTERIAL: 24.4 MMOL/L
HCT VFR BLD CALC: 39.8 % (ref 41–53)
HCT VFR BLD CALC: 39.8 % (ref 41–53)
HEMOGLOBIN: 13.6 G/DL (ref 13.5–17.5)
HEMOGLOBIN: 13.6 G/DL (ref 13.5–17.5)
IMMATURE GRANULOCYTES: ABNORMAL %
IMMATURE GRANULOCYTES: ABNORMAL %
LACTIC ACID: 1.5 MMOL/L (ref 0.5–2.2)
LACTIC ACID: 2.1 MMOL/L (ref 0.5–2.2)
LACTIC ACID: 3.7 MMOL/L (ref 0.5–2.2)
LYMPHOCYTES # BLD: 3 % (ref 24–44)
LYMPHOCYTES # BLD: 5 % (ref 24–44)
MCH RBC QN AUTO: 33.3 PG (ref 26–34)
MCH RBC QN AUTO: 33.5 PG (ref 26–34)
MCHC RBC AUTO-ENTMCNC: 34.1 G/DL (ref 31–37)
MCHC RBC AUTO-ENTMCNC: 34.2 G/DL (ref 31–37)
MCV RBC AUTO: 97.8 FL (ref 80–100)
MCV RBC AUTO: 98 FL (ref 80–100)
METHEMOGLOBIN: 0.4 %
METHEMOGLOBIN: 0.5 %
METHEMOGLOBIN: 0.5 %
MODE: ABNORMAL
MODE: NORMAL
MODE: NORMAL
MONOCYTES # BLD: 5 % (ref 1–7)
MONOCYTES # BLD: 5 % (ref 1–7)
MORPHOLOGY: ABNORMAL
MORPHOLOGY: NORMAL
NEGATIVE BASE EXCESS, ART: 0.9 MMOL/L (ref 0–2)
NEGATIVE BASE EXCESS, ART: 1.1 MMOL/L (ref 0–2)
NEGATIVE BASE EXCESS, ART: 3.8 MMOL/L (ref 0–2)
NOTIFICATION TIME: ABNORMAL
NOTIFICATION TIME: NORMAL
NOTIFICATION TIME: NORMAL
NOTIFICATION: ABNORMAL
NOTIFICATION: NORMAL
NOTIFICATION: NORMAL
NRBC AUTOMATED: ABNORMAL PER 100 WBC
NRBC AUTOMATED: ABNORMAL PER 100 WBC
NUCLEATED RED BLOOD CELLS: 1 PER 100 WBC
O2 DEVICE/FLOW/%: ABNORMAL
O2 DEVICE/FLOW/%: NORMAL
O2 DEVICE/FLOW/%: NORMAL
O2 SAT, ARTERIAL: 88.3 %
O2 SAT, ARTERIAL: 93 %
O2 SAT, ARTERIAL: 96.8 %
OXYHEMOGLOBIN: ABNORMAL % (ref 95–98)
OXYHEMOGLOBIN: NORMAL % (ref 95–98)
OXYHEMOGLOBIN: NORMAL % (ref 95–98)
PATIENT TEMP: 37
PCO2 ARTERIAL: 39.1 MMHG
PCO2 ARTERIAL: 43.7 MMHG
PCO2 ARTERIAL: 49.4 MMHG
PCO2, ART, TEMP ADJ: ABNORMAL
PCO2, ART, TEMP ADJ: NORMAL
PCO2, ART, TEMP ADJ: NORMAL
PDW BLD-RTO: 13.2 % (ref 11.5–14.9)
PDW BLD-RTO: 13.3 % (ref 11.5–14.9)
PEEP/CPAP: 5
PEEP/CPAP: 5
PEEP/CPAP: NORMAL
PH ARTERIAL: 7.28
PH ARTERIAL: 7.36
PH ARTERIAL: 7.39
PH, ART, TEMP ADJ: ABNORMAL
PH, ART, TEMP ADJ: NORMAL
PH, ART, TEMP ADJ: NORMAL
PLATELET # BLD: 108 K/UL (ref 150–450)
PLATELET # BLD: 108 K/UL (ref 150–450)
PLATELET ESTIMATE: ABNORMAL
PLATELET ESTIMATE: ABNORMAL
PMV BLD AUTO: 9.5 FL (ref 6–12)
PMV BLD AUTO: 9.7 FL (ref 6–12)
PO2 ARTERIAL: 112 MMHG
PO2 ARTERIAL: 56.7 MMHG
PO2 ARTERIAL: 72.2 MMHG
PO2, ART, TEMP ADJ: ABNORMAL MMHG
PO2, ART, TEMP ADJ: NORMAL MMHG
PO2, ART, TEMP ADJ: NORMAL MMHG
POSITIVE BASE EXCESS, ART: ABNORMAL MMOL/L (ref 0–2)
POSITIVE BASE EXCESS, ART: NORMAL MMOL/L (ref 0–2)
POSITIVE BASE EXCESS, ART: NORMAL MMOL/L (ref 0–2)
POTASSIUM SERPL-SCNC: 3.8 MMOL/L (ref 3.7–5.3)
PSV: ABNORMAL
PSV: NORMAL
PSV: NORMAL
PT. POSITION: ABNORMAL
PT. POSITION: NORMAL
PT. POSITION: NORMAL
RBC # BLD: 4.06 M/UL (ref 4.5–5.9)
RBC # BLD: 4.07 M/UL (ref 4.5–5.9)
RBC # BLD: ABNORMAL 10*6/UL
RBC # BLD: ABNORMAL 10*6/UL
RESPIRATORY RATE: 24
RESPIRATORY RATE: 28
RESPIRATORY RATE: ABNORMAL
SAMPLE SITE: ABNORMAL
SAMPLE SITE: NORMAL
SAMPLE SITE: NORMAL
SEG NEUTROPHILS: 90 % (ref 36–66)
SEG NEUTROPHILS: 92 % (ref 36–66)
SEGMENTED NEUTROPHILS ABSOLUTE COUNT: 10.93 K/UL (ref 1.3–9.1)
SEGMENTED NEUTROPHILS ABSOLUTE COUNT: 11.34 K/UL (ref 1.3–9.1)
SET RATE: 16
SET RATE: 20
SET RATE: NORMAL
SODIUM BLD-SCNC: 137 MMOL/L (ref 135–144)
TEXT FOR RESPIRATORY: ABNORMAL
TEXT FOR RESPIRATORY: NORMAL
TEXT FOR RESPIRATORY: NORMAL
TOTAL HB: ABNORMAL G/DL (ref 12–16)
TOTAL HB: NORMAL G/DL (ref 12–16)
TOTAL HB: NORMAL G/DL (ref 12–16)
TOTAL RATE: 21
TOTAL RATE: 28
TOTAL RATE: NORMAL
VT: 500
VT: 500
VT: NORMAL
WBC # BLD: 11.9 K/UL (ref 3.5–11)
WBC # BLD: 12.6 K/UL (ref 3.5–11)
WBC # BLD: ABNORMAL 10*3/UL
WBC # BLD: ABNORMAL 10*3/UL

## 2018-02-24 PROCEDURE — 36415 COLL VENOUS BLD VENIPUNCTURE: CPT

## 2018-02-24 PROCEDURE — 2580000003 HC RX 258: Performed by: INTERNAL MEDICINE

## 2018-02-24 PROCEDURE — 2580000003 HC RX 258: Performed by: STUDENT IN AN ORGANIZED HEALTH CARE EDUCATION/TRAINING PROGRAM

## 2018-02-24 PROCEDURE — 31500 INSERT EMERGENCY AIRWAY: CPT

## 2018-02-24 PROCEDURE — 6360000002 HC RX W HCPCS: Performed by: RADIOLOGY

## 2018-02-24 PROCEDURE — 2500000003 HC RX 250 WO HCPCS: Performed by: RADIOLOGY

## 2018-02-24 PROCEDURE — 6360000002 HC RX W HCPCS

## 2018-02-24 PROCEDURE — 82805 BLOOD GASES W/O2 SATURATION: CPT

## 2018-02-24 PROCEDURE — 82947 ASSAY GLUCOSE BLOOD QUANT: CPT

## 2018-02-24 PROCEDURE — 99233 SBSQ HOSP IP/OBS HIGH 50: CPT | Performed by: INTERNAL MEDICINE

## 2018-02-24 PROCEDURE — 2500000003 HC RX 250 WO HCPCS: Performed by: INTERNAL MEDICINE

## 2018-02-24 PROCEDURE — 36600 WITHDRAWAL OF ARTERIAL BLOOD: CPT

## 2018-02-24 PROCEDURE — 6370000000 HC RX 637 (ALT 250 FOR IP): Performed by: INTERNAL MEDICINE

## 2018-02-24 PROCEDURE — 94770 HC ETCO2 MONITOR DAILY: CPT

## 2018-02-24 PROCEDURE — 94762 N-INVAS EAR/PLS OXIMTRY CONT: CPT

## 2018-02-24 PROCEDURE — 86140 C-REACTIVE PROTEIN: CPT

## 2018-02-24 PROCEDURE — 99291 CRITICAL CARE FIRST HOUR: CPT | Performed by: INTERNAL MEDICINE

## 2018-02-24 PROCEDURE — 6360000002 HC RX W HCPCS: Performed by: INTERNAL MEDICINE

## 2018-02-24 PROCEDURE — 6360000002 HC RX W HCPCS: Performed by: ANESTHESIOLOGY

## 2018-02-24 PROCEDURE — 85025 COMPLETE CBC W/AUTO DIFF WBC: CPT

## 2018-02-24 PROCEDURE — 93005 ELECTROCARDIOGRAM TRACING: CPT

## 2018-02-24 PROCEDURE — 0BH17EZ INSERTION OF ENDOTRACHEAL AIRWAY INTO TRACHEA, VIA NATURAL OR ARTIFICIAL OPENING: ICD-10-PCS | Performed by: ANESTHESIOLOGY

## 2018-02-24 PROCEDURE — 71045 X-RAY EXAM CHEST 1 VIEW: CPT

## 2018-02-24 PROCEDURE — 83605 ASSAY OF LACTIC ACID: CPT

## 2018-02-24 PROCEDURE — 94002 VENT MGMT INPAT INIT DAY: CPT

## 2018-02-24 PROCEDURE — 6360000002 HC RX W HCPCS: Performed by: STUDENT IN AN ORGANIZED HEALTH CARE EDUCATION/TRAINING PROGRAM

## 2018-02-24 PROCEDURE — C9113 INJ PANTOPRAZOLE SODIUM, VIA: HCPCS | Performed by: RADIOLOGY

## 2018-02-24 PROCEDURE — 2000000000 HC ICU R&B

## 2018-02-24 PROCEDURE — 6370000000 HC RX 637 (ALT 250 FOR IP): Performed by: RADIOLOGY

## 2018-02-24 PROCEDURE — 94640 AIRWAY INHALATION TREATMENT: CPT

## 2018-02-24 PROCEDURE — 2580000003 HC RX 258: Performed by: RADIOLOGY

## 2018-02-24 PROCEDURE — 5A1945Z RESPIRATORY VENTILATION, 24-96 CONSECUTIVE HOURS: ICD-10-PCS | Performed by: INTERNAL MEDICINE

## 2018-02-24 RX ORDER — IPRATROPIUM BROMIDE AND ALBUTEROL SULFATE 2.5; .5 MG/3ML; MG/3ML
1 SOLUTION RESPIRATORY (INHALATION) EVERY 4 HOURS
Status: DISCONTINUED | OUTPATIENT
Start: 2018-02-24 | End: 2018-02-28

## 2018-02-24 RX ORDER — HALOPERIDOL 5 MG/ML
2 INJECTION INTRAMUSCULAR ONCE
Status: COMPLETED | OUTPATIENT
Start: 2018-02-24 | End: 2018-02-24

## 2018-02-24 RX ORDER — PANTOPRAZOLE SODIUM 40 MG/10ML
40 INJECTION, POWDER, LYOPHILIZED, FOR SOLUTION INTRAVENOUS DAILY
Status: DISCONTINUED | OUTPATIENT
Start: 2018-02-24 | End: 2018-02-27

## 2018-02-24 RX ORDER — MINERAL OIL AND WHITE PETROLATUM 150; 830 MG/G; MG/G
OINTMENT OPHTHALMIC EVERY 4 HOURS
Status: DISCONTINUED | OUTPATIENT
Start: 2018-02-24 | End: 2018-02-26

## 2018-02-24 RX ORDER — 0.9 % SODIUM CHLORIDE 0.9 %
10 VIAL (ML) INJECTION DAILY
Status: DISCONTINUED | OUTPATIENT
Start: 2018-02-24 | End: 2018-02-27

## 2018-02-24 RX ORDER — PROPOFOL 10 MG/ML
10 INJECTION, EMULSION INTRAVENOUS CONTINUOUS
Status: DISCONTINUED | OUTPATIENT
Start: 2018-02-24 | End: 2018-02-26

## 2018-02-24 RX ORDER — INSULIN GLARGINE 100 [IU]/ML
24 INJECTION, SOLUTION SUBCUTANEOUS NIGHTLY
Status: DISCONTINUED | OUTPATIENT
Start: 2018-02-24 | End: 2018-02-25

## 2018-02-24 RX ORDER — CHLORHEXIDINE GLUCONATE 0.12 MG/ML
15 RINSE ORAL 2 TIMES DAILY
Status: DISCONTINUED | OUTPATIENT
Start: 2018-02-24 | End: 2018-02-26

## 2018-02-24 RX ORDER — FENTANYL CITRATE 50 UG/ML
50 INJECTION, SOLUTION INTRAMUSCULAR; INTRAVENOUS EVERY 30 MIN PRN
Status: DISCONTINUED | OUTPATIENT
Start: 2018-02-24 | End: 2018-02-26

## 2018-02-24 RX ORDER — SUCCINYLCHOLINE CHLORIDE 20 MG/ML
INJECTION INTRAMUSCULAR; INTRAVENOUS
Status: COMPLETED | OUTPATIENT
Start: 2018-02-24 | End: 2018-02-24

## 2018-02-24 RX ORDER — PROPOFOL 10 MG/ML
INJECTION, EMULSION INTRAVENOUS CONTINUOUS PRN
Status: COMPLETED | OUTPATIENT
Start: 2018-02-24 | End: 2018-02-24

## 2018-02-24 RX ORDER — MORPHINE SULFATE 2 MG/ML
2 INJECTION, SOLUTION INTRAMUSCULAR; INTRAVENOUS ONCE
Status: COMPLETED | OUTPATIENT
Start: 2018-02-24 | End: 2018-02-24

## 2018-02-24 RX ORDER — POLYVINYL ALCOHOL 14 MG/ML
1 SOLUTION/ DROPS OPHTHALMIC EVERY 4 HOURS
Status: DISCONTINUED | OUTPATIENT
Start: 2018-02-24 | End: 2018-02-26

## 2018-02-24 RX ORDER — 0.9 % SODIUM CHLORIDE 0.9 %
1000 INTRAVENOUS SOLUTION INTRAVENOUS ONCE
Status: COMPLETED | OUTPATIENT
Start: 2018-02-24 | End: 2018-02-24

## 2018-02-24 RX ORDER — METOPROLOL TARTRATE 5 MG/5ML
5 INJECTION INTRAVENOUS EVERY 12 HOURS
Status: DISCONTINUED | OUTPATIENT
Start: 2018-02-24 | End: 2018-02-25

## 2018-02-24 RX ORDER — METHYLPREDNISOLONE SODIUM SUCCINATE 40 MG/ML
40 INJECTION, POWDER, LYOPHILIZED, FOR SOLUTION INTRAMUSCULAR; INTRAVENOUS EVERY 6 HOURS
Status: DISCONTINUED | OUTPATIENT
Start: 2018-02-24 | End: 2018-02-26

## 2018-02-24 RX ADMIN — INSULIN LISPRO 4 UNITS: 100 INJECTION, SOLUTION INTRAVENOUS; SUBCUTANEOUS at 13:48

## 2018-02-24 RX ADMIN — POLYVINYL ALCOHOL 1 DROP: 14 SOLUTION/ DROPS OPHTHALMIC at 09:23

## 2018-02-24 RX ADMIN — Medication 10 ML: at 09:01

## 2018-02-24 RX ADMIN — INSULIN LISPRO 6 UNITS: 100 INJECTION, SOLUTION INTRAVENOUS; SUBCUTANEOUS at 18:32

## 2018-02-24 RX ADMIN — PROPOFOL 45 MCG/KG/MIN: 10 INJECTION, EMULSION INTRAVENOUS at 14:09

## 2018-02-24 RX ADMIN — METOPROLOL TARTRATE 5 MG: 5 INJECTION, SOLUTION INTRAVENOUS at 21:08

## 2018-02-24 RX ADMIN — PROPOFOL 45 MCG/KG/MIN: 10 INJECTION, EMULSION INTRAVENOUS at 08:57

## 2018-02-24 RX ADMIN — PROPOFOL 45 MCG/KG/MIN: 10 INJECTION, EMULSION INTRAVENOUS at 17:48

## 2018-02-24 RX ADMIN — IPRATROPIUM BROMIDE AND ALBUTEROL SULFATE 1 AMPULE: .5; 3 SOLUTION RESPIRATORY (INHALATION) at 15:57

## 2018-02-24 RX ADMIN — PROPOFOL 10 MCG/KG/MIN: 10 INJECTION, EMULSION INTRAVENOUS at 05:55

## 2018-02-24 RX ADMIN — PROPOFOL 45 MCG/KG/MIN: 10 INJECTION, EMULSION INTRAVENOUS at 21:08

## 2018-02-24 RX ADMIN — IPRATROPIUM BROMIDE AND ALBUTEROL SULFATE 1 AMPULE: .5; 3 SOLUTION RESPIRATORY (INHALATION) at 08:13

## 2018-02-24 RX ADMIN — INSULIN GLARGINE 24 UNITS: 100 INJECTION, SOLUTION SUBCUTANEOUS at 21:09

## 2018-02-24 RX ADMIN — PROPOFOL 100 MG: 10 INJECTION, EMULSION INTRAVENOUS at 05:53

## 2018-02-24 RX ADMIN — SODIUM CHLORIDE 1000 ML: 9 INJECTION, SOLUTION INTRAVENOUS at 07:08

## 2018-02-24 RX ADMIN — CHLORHEXIDINE GLUCONATE 15 ML: 1.2 RINSE ORAL at 09:22

## 2018-02-24 RX ADMIN — Medication 120 MG: at 05:53

## 2018-02-24 RX ADMIN — METHYLPREDNISOLONE SODIUM SUCCINATE 40 MG: 40 INJECTION, POWDER, FOR SOLUTION INTRAMUSCULAR; INTRAVENOUS at 08:58

## 2018-02-24 RX ADMIN — CEFEPIME 2 G: 2 INJECTION, POWDER, FOR SOLUTION INTRAVENOUS at 08:49

## 2018-02-24 RX ADMIN — IPRATROPIUM BROMIDE AND ALBUTEROL SULFATE 1 AMPULE: .5; 3 SOLUTION RESPIRATORY (INHALATION) at 19:28

## 2018-02-24 RX ADMIN — POLYVINYL ALCOHOL 1 DROP: 14 SOLUTION/ DROPS OPHTHALMIC at 13:50

## 2018-02-24 RX ADMIN — POLYVINYL ALCOHOL 1 DROP: 14 SOLUTION/ DROPS OPHTHALMIC at 18:34

## 2018-02-24 RX ADMIN — SODIUM CHLORIDE: 9 INJECTION, SOLUTION INTRAVENOUS at 08:46

## 2018-02-24 RX ADMIN — HALOPERIDOL LACTATE 2 MG: 5 INJECTION, SOLUTION INTRAMUSCULAR at 01:52

## 2018-02-24 RX ADMIN — FENTANYL CITRATE 50 MCG: 50 INJECTION INTRAMUSCULAR; INTRAVENOUS at 06:20

## 2018-02-24 RX ADMIN — ENOXAPARIN SODIUM 40 MG: 40 INJECTION SUBCUTANEOUS at 08:52

## 2018-02-24 RX ADMIN — CHLORHEXIDINE GLUCONATE 15 ML: 1.2 RINSE ORAL at 21:09

## 2018-02-24 RX ADMIN — WATER 2 G: 1 INJECTION INTRAMUSCULAR; INTRAVENOUS; SUBCUTANEOUS at 18:28

## 2018-02-24 RX ADMIN — METOPROLOL TARTRATE 5 MG: 5 INJECTION, SOLUTION INTRAVENOUS at 09:07

## 2018-02-24 RX ADMIN — METHYLPREDNISOLONE SODIUM SUCCINATE 40 MG: 40 INJECTION, POWDER, FOR SOLUTION INTRAMUSCULAR; INTRAVENOUS at 14:12

## 2018-02-24 RX ADMIN — PANTOPRAZOLE SODIUM 40 MG: 40 INJECTION, POWDER, FOR SOLUTION INTRAVENOUS at 09:00

## 2018-02-24 RX ADMIN — METHYLPREDNISOLONE SODIUM SUCCINATE 40 MG: 40 INJECTION, POWDER, FOR SOLUTION INTRAMUSCULAR; INTRAVENOUS at 21:09

## 2018-02-24 RX ADMIN — SODIUM CHLORIDE: 9 INJECTION, SOLUTION INTRAVENOUS at 21:11

## 2018-02-24 RX ADMIN — MORPHINE SULFATE 2 MG: 2 INJECTION, SOLUTION INTRAMUSCULAR; INTRAVENOUS at 05:17

## 2018-02-24 RX ADMIN — VANCOMYCIN HYDROCHLORIDE 1250 MG: 1 INJECTION, POWDER, LYOPHILIZED, FOR SOLUTION INTRAVENOUS at 09:19

## 2018-02-24 RX ADMIN — POLYVINYL ALCOHOL 1 DROP: 14 SOLUTION/ DROPS OPHTHALMIC at 21:08

## 2018-02-24 RX ADMIN — IPRATROPIUM BROMIDE AND ALBUTEROL SULFATE 1 AMPULE: .5; 3 SOLUTION RESPIRATORY (INHALATION) at 23:33

## 2018-02-24 RX ADMIN — NAFCILLIN SODIUM 2 G: 2 INJECTION, POWDER, LYOPHILIZED, FOR SOLUTION INTRAMUSCULAR; INTRAVENOUS at 16:04

## 2018-02-24 RX ADMIN — IPRATROPIUM BROMIDE AND ALBUTEROL SULFATE 1 AMPULE: .5; 3 SOLUTION RESPIRATORY (INHALATION) at 12:10

## 2018-02-24 RX ADMIN — INSULIN LISPRO 2 UNITS: 100 INJECTION, SOLUTION INTRAVENOUS; SUBCUTANEOUS at 08:37

## 2018-02-24 RX ADMIN — MINERAL OIL AND WHITE PETROLATUM: 150; 830 OINTMENT OPHTHALMIC at 21:08

## 2018-02-24 ASSESSMENT — PAIN SCALES - GENERAL
PAINLEVEL_OUTOF10: 7
PAINLEVEL_OUTOF10: 2
PAINLEVEL_OUTOF10: 0

## 2018-02-24 ASSESSMENT — PULMONARY FUNCTION TESTS
PIF_VALUE: 6
PIF_VALUE: 18
PIF_VALUE: 13
PIF_VALUE: 7
PIF_VALUE: 15
PIF_VALUE: 13
PIF_VALUE: 15
PIF_VALUE: 16

## 2018-02-24 NOTE — PLAN OF CARE
Problem: Falls - Risk of  Goal: Absence of falls  Outcome: Met This Shift  No falls. Pt up to bsc with 1 assist, gait unsteady at times. telesitter on for pt safety,    Problem: OXYGENATION/RESPIRATORY FUNCTION  Goal: Patient will maintain patent airway  Outcome: Met This Shift  Airway remains patent  Goal: Patient will achieve/maintain normal respiratory rate/effort  Respiratory rate and effort will be within normal limits for the patient   Outcome: Met This Shift  Pt states his breathing is back to baseline    Problem: Gas Exchange - Impaired:  Goal: Levels of oxygenation will improve  Levels of oxygenation will improve   Outcome: Met This Shift  O2 sats > 94% this shift    Problem: Infection, Septic Shock:  Goal: Will show no infection signs and symptoms  Will show no infection signs and symptoms   Outcome: Ongoing  Cultures as noted. Antibiotics given as ordered    Problem: Pain:  Goal: Control of acute pain  Control of acute pain   Outcome: Met This Shift  Pt states pain is tolerable.

## 2018-02-24 NOTE — PROGRESS NOTES
Nutrition Assessment    Type and Reason for Visit: Initial    Nutrition Recommendations: Continue NPO. Consider nutrition support if NPO continues    Malnutrition Assessment:  · Malnutrition Status: Insufficient data    Nutrition Diagnosis:   · Problem: Inadequate oral intake  · Etiology: related to Impaired respiratory function-inability to consume food     Signs and symptoms:  as evidenced by NPO status due to medical condition    Nutrition Assessment:  · Subjective Assessment: Pt. admitted with diagnosis of Sepsis, deteriorated overnight with increased agitation, tachycardia, and tachypnea. Intubated this morning, sedated diprivan running 25 ml/hour at the time of visit. Pt. had nausea this morning but no emesis, diarrhea, or constipation. · Nutrition-Focused Physical Findings: +1 non-pitting edema BLE  · Current Nutrition Therapies:  · Oral Diet Orders: NPO   · Oral Diet intake: Unable to assess  · Oral Nutrition Supplement (ONS) Orders:    · ONS intake: Unable to assess  · Anthropometric Measures:  · Ht: 5' 7\" (170.2 cm)   · Current Body Wt: 200 lb 9.9 oz (91 kg)  · Admission Body Wt: 200 lb 9.9 oz (91 kg)  · Ideal Body Wt: 148 lb (67.1 kg), % Ideal Body 135%  · BMI Classification: BMI 30.0 - 34.9 Obese Class I  · Comparative Standards (Estimated Nutrition Needs):  · Estimated Daily Total Kcal: 0753-1842  · Estimated Daily Protein (g): 100-134    Estimated Intake vs Estimated Needs: Intake Less Than Needs    Nutrition Risk Level: High    Nutrition Interventions:   Continue NPO  Continued Inpatient Monitoring    Nutrition Evaluation:   · Evaluation: Goals set   · Goals: adequate nutriton provision    · Monitoring: NPO Status, Wound Healing, Fluid Balance, Ascites/Edema, Weight, Pertinent Labs    See Adult Nutrition Doc Flowsheet for more detail.      Merlin Bushman, RD, LD  Office phone [phone]

## 2018-02-24 NOTE — PROGRESS NOTES
72 hours. BNP: No results for input(s): BNP in the last 72 hours. Lipids: No results for input(s): CHOL, HDL in the last 72 hours. Invalid input(s): LDLCALCU  INR:   Recent Labs      02/21/18   1522  02/21/18   1957  02/23/18   0824   INR  1.3  1.4  1.3       Objective:   Vitals: BP (!) 166/120   Pulse 71   Temp 97.9 °F (36.6 °C) (Oral)   Resp 17   Ht 5' 7\" (1.702 m)   Wt 200 lb 9.9 oz (91 kg)   SpO2 97%   BMI 31.42 kg/m²   General appearance: intubated and sedated  HEENT: Head: Normocephalic, no lesions, without obvious abnormality. Neck: no JVD  Lungs: CTAB  Heart: RRR s1+s2, no murmurs  Abdomen: soft, non-tender  Extremities: no edema  Neurologic: intubated and sedated    TTE 02/22/18  Summary  Left ventricle is normal in size. Mild left ventricular hypertrophy. Estimated LV EF 50-55 %. Right atrial dilatation. Right ventricular dilatation with reduced systolic function. Aortic leaflet calcification with mild aortic stenosis. Mitral annular calcification is seen. Trivial mitral regurgitation. Mild tricuspid regurgitation. Estimated right ventricular systolic pressure is 43 mmHg. Mild pulmonary hypertension. No significant pericardial effusion is seen. Aortic root dimension is at upper limit of normal.      Assessment / Acute Cardiac Problems:   1. Sepsis  2. Acute respiratory failure  3. Staph bacteremia  4. Mild aortic stenosis  5. COPD  6. HTN  7. PVCs      Patient Active Problem List:     COPD (chronic obstructive pulmonary disease) ([address]     Diabetes 1.5, managed as type 2 ([address]     Hyperlipidemia     Hypertension     History of bladder cancer     Tobacco use     Sepsis ([address]     Diabetes mellitus type 2, insulin dependent ([address]     Thrombocytopenia ([address]      Plan of Treatment:   1. On antibiotics  2. LUIS is scheduled for Monday  3.  IV lopressor scheduled      Mavis Parham [id number] [facility]  [phone]

## 2018-02-24 NOTE — PROGRESS NOTES
Volume: 9.9 Liters  FiO2 : 60 %  Peak Inspiratory Pressure: 13 cmH2O  I:E Ratio: 1:1.45  Sensitivity: 5  PEEP/CPAP: 5  I Time/ I Time %: 1.25 s  Mean Airway Pressure: 7.4 cmH20  Additional Respiratory  Assessments  Pulse: 86  Resp: 21  SpO2: 100 %  Oral Care: Mouthwash       ABGs: Lab Results   Component Value Date    PHART 7.410 02/22/2018    PO2ART 52.6 02/22/2018    GNF9XYZ 40.7 02/22/2018       Lab Results   Component Value Date    MODE NOT REPORTED 02/22/2018         Medications   IV   propofol 40 mcg/kg/min (02/24/18 0625)    sodium chloride 75 mL/hr at 02/23/18 173    dextrose        polyvinyl alcohol  1 drop Both Eyes Q4H    And    lubrifresh P.M. Both Eyes Q4H    chlorhexidine  15 mL Mouth/Throat BID    metoprolol tartrate  12.5 mg Oral BID    methylPREDNISolone  30 mg Intravenous Q8H    vancomycin (VANCOCIN) intermittent dosing (placeholder)   Other RX Placeholder    vancomycin  1,250 mg Intravenous Q24H    tamsulosin  0.4 mg Oral Daily    rOPINIRole  2 mg Oral Daily    sodium chloride flush  10 mL Intravenous 2 times per day    enoxaparin  40 mg Subcutaneous Daily    ipratropium-albuterol  1 ampule Inhalation Q4H WA    famotidine  20 mg Oral BID    insulin glargine  20 Units Subcutaneous Nightly    insulin lispro  0-12 Units Subcutaneous TID WC    insulin lispro  0-6 Units Subcutaneous Nightly    finasteride  5 mg Oral Daily    mometasone-formoterol  2 puff Inhalation BID    simvastatin  20 mg Oral Nightly    lisinopril  20 mg Oral Daily       Diet/Nutrition   DIET CARDIAC; Diet NPO, After Midnight    Exam   VITALS    height is 5' 7\" (1.702 m) and weight is 200 lb 9.9 oz (91 kg). His axillary temperature is 98.4 °F (36.9 °C). His blood pressure is 166/120 (abnormal) and his pulse is 86. His respiration is 21 and oxygen saturation is 100%.    Ventilator Settings (Basic)  Vent Mode: PRVC/AC Rate Set: 16 bmp/Vt Ordered: 500 mL/ /FiO2 : 60 %    Constitutional - Sedated  General Appearance  well developed, well nourished  HEENT - Life support devices in place (ET, OG),normocephalic, atraumatic. PERRLA  Lungs - Chest expands equally, no wheezes,Scattered rales at bases   Cardiovascular - Heart sounds are normal.  normal rate and rhythm regular, no murmur, gallop or rub. Abdomen - soft, nontender, nondistended, no masses or organomegaly  Neurologic - CN II-XII are grossly intact.  There are no focal motor deficits  Skin - no bruising or bleeding  Extremities - no cyanosis, clubbing or edema; mottling of extremities    Lab Results   CBC   Lab Results   Component Value Date    WBC 12.6 02/24/2018    RBC 4.06 02/24/2018    HGB 13.6 02/24/2018    HCT 39.8 02/24/2018     02/24/2018    MCV 98.0 02/24/2018    MCH 33.5 02/24/2018    MCHC 34.2 02/24/2018    RDW 13.2 02/24/2018    NRBC 1 02/23/2018    METASPCT 1 02/22/2018    LYMPHOPCT 5 02/24/2018    MONOPCT 5 02/24/2018    BASOPCT 0 02/24/2018    MONOSABS 0.63 02/24/2018    LYMPHSABS 0.63 02/24/2018    EOSABS 0.00 02/24/2018    BASOSABS 0.00 02/24/2018    DIFFTYPE NOT REPORTED 02/24/2018       BMP Lab Results   Component Value Date     02/23/2018    K 3.8 02/23/2018     02/23/2018    CO2 21 02/23/2018    BUN 32 02/23/2018    CREATININE 0.96 02/23/2018    GLUCOSE 273 02/23/2018    CALCIUM 8.3 02/23/2018       LFTS  Lab Results   Component Value Date    ALKPHOS 56 02/21/2018    ALT 27 02/21/2018    AST 24 02/21/2018    PROT 7.1 02/21/2018    BILITOT 1.37 02/21/2018    BILIDIR 0.44 02/21/2018    IBILI 1.20 02/21/2018    LABALBU 3.6 02/21/2018       INR  Recent Labs      02/21/18   1522  02/21/18   1957  02/23/18   0824   PROTIME  13.8*  14.5*  13.4*   INR  1.3  1.4  1.3       APTT  Recent Labs      02/21/18   1522  02/23/18   0824   APTT  27.3  30.3       Lactic Acid  Lab Results   Component Value Date    LACTA 3.7 02/23/2018    LACTA 3.2 02/23/2018    LACTA 3.9 02/23/2018        BNP   No results for input(s): BNP in the last 72

## 2018-02-24 NOTE — PROGRESS NOTES
2/23/18 9676. [employer] alarm activated. This RN and Dionte Keller RN entered room to find patient standing at bedside holding on to the foot of the bed with former IV site bleeding profusely. Pt nasal cannula was not in place, pt was grey in color and insistent on immediately being assisted to the bedside commode. Pt would not permit redirection, stoppage of blood flow, or allow oxygen to be placed first. [employer] paged security for RNs due to non-cooperative behavior and verbal aggression of patient. Security and other RNs arrived on scene. Orders for restraints obtained; family called and en route. See flowsheets for further documentation.

## 2018-02-24 NOTE — ANESTHESIA PROCEDURE NOTES
Airway  Date/Time: 2/24/2018 5:45 AM  Urgency: emergent    Difficult airway    General Information and Staff    Patient location during procedure: ICU  Anesthesiologist: Vesta Clifton  Performed: anesthesiologist     Consent for Airway (if performed for an anesthetic, see related documentation for consents)  Patient identity confirmed: per hospital policy  Consent: The procedure was performed in an emergent situation. Verbal consent not obtained. Written consent not obtained.   Risks and benefits: risks, benefits and alternatives were not discussed  Consent given by: other (add comment)      Code status verified:yes  Indications and Patient Condition  Indications for airway management: respiratory failure  Spontaneous ventilation: present  Sedation level: deep  Preoxygenated: yes  Patient position: sniffing  MILS maintained throughout  Mask difficulty assessment: vent by bag mask    Final Airway Details  Final airway type: endotracheal airway      Successful airway: ETT  Cuffed: yes   Successful intubation technique: video laryngoscopy  Facilitating devices/methods: intubating stylet  Blade: Stefanie  Blade size: #3  ETT size: 7.5 mm  Cormack-Lehane Classification: grade IIa - partial view of glottis  Placement verified by: chest auscultation and capnometry   Inital cuff pressure (cm H2O): 15  Measured from: lips  ETT to lips (cm): 23  Number of attempts at approach: 1  Ventilation between attempts: bag mask  Number of other approaches attempted: 0

## 2018-02-24 NOTE — PROGRESS NOTES
Ordering Physician Provided Reason for Exam: fall Acuity: Acute Type of Exam: Initial Mechanism of Injury: Pt states that he got dizzy and fell off toilet today. Left hip discomfort Relevant Medical/Surgical History: Pt states that he got dizzy and fell off toilet today. Left hip discomfort Additional history of hypertension, bladder cancer, gastroesophageal reflux disease, asthma, tobacco abuse, chronic obstructive pulmonary disease and respiratory failure, and multiple drug resistant organisms. FINDINGS: Overlying ECG monitor leads. Mildly enlarged but stable appearing cardiac silhouette. Uncoiled and calcified thoracic aorta, similar by comparison. Mild basilar atelectasis or scarring, best seen right lateral base. No consolidation or sizable pleural effusion. No pneumothorax. No obvious rib fracture. Some loss mid thoracic vertebral body height of indeterminate age. Mild kyphoscoliosis thoracic spine with some osteopenia and DJD. Clips status post cholecystectomy. No acute cardiopulmonary disease. Bibasilar atelectasis or scarring. Some loss mid thoracic vertebral body height, possibly chronic. Correlate clinically. Xr Pelvis (1-2 Views)    Result Date: 2/21/2018  EXAMINATION: SINGLE VIEW OF THE PELVIS 2/21/2018 4:09 pm COMPARISON: CT angiography of the abdomen and pelvis dated 07/19/2011 HISTORY: ORDERING SYSTEM PROVIDED HISTORY: fall TECHNOLOGIST PROVIDED HISTORY: Reason for exam:->fall Ordering Physician Provided Reason for Exam: fall Acuity: Acute Type of Exam: Initial Mechanism of Injury: Pt states that he got dizzy and fell off toilet today. Left hip discomfort Relevant Medical/Surgical History: Pt states that he got dizzy and fell off toilet today. Left hip discomfort FINDINGS: Pelvic bones are intact without evidence of acute fracture or displacement. Proximal femurs appear to be grossly intact.   There is an unchanged nonaggressive chondroid lesion in the right femoral neck, previously CONTRAST 2/21/2018 4:25 pm TECHNIQUE: CT of the cervical spine was performed without the administration of intravenous contrast. Multiplanar reformatted images are provided for review. Dose modulation, iterative reconstruction, and/or weight based adjustment of the mA/kV was utilized to reduce the radiation dose to as low as reasonably achievable. COMPARISON: None. HISTORY: ORDERING SYSTEM PROVIDED HISTORY: fall History of bladder cancer, osteoarthritis, skin cancer. FINDINGS: BONES/ALIGNMENT: There is no evidence of an acute cervical spine fracture. There is normal alignment of the cervical spine. DEGENERATIVE CHANGES: Advanced multilevel degenerative changes, most severe C4-C7 with marked disc space narrowing and endplate degenerative sclerosis with disc osteophyte complex type findings C5-C6 and C6-C7. Mild impingement on the canal and moderate -severe impingement on the neural foramina noted, the latter greatest on the left at C5 -C6 and C6 -C7. Mild multilevel facet arthropathy and additional spondylitic changes. Some degenerative change C1-C2 with mild pannus formation. No bony erosion. Some degenerative change noted visualized thoracic spine with a small lucency T2 on the left, and some lucency within the pedicle at T3 on the same side. SOFT TISSUES: There is no prevertebral soft tissue swelling. Mucosal thickening visualized sphenoid sinus. No air-fluid level noted. Carotid bifurcation vascular calcifications. Emphysematous changes mid upper visualized lungs. No acute abnormality of the cervical spine. Advanced multilevel degenerative changes, most severe C5-C7 with associated findings, as above. Emphysema/ COPD. Nonacute appearing sphenoid sinus disease. Several small bony lucencies left T2 vertebral body and left pedicle at T3 likely incidental/ related to some degree of osteopenia ; correlate clinically and obtain follow-up imaging as indicated.      Ct Chest Pulmonary Embolism W Abdominal: Soft. He exhibits no distension. Tenderness: No flank pain, no suprapubic tenderness. Musculoskeletal: He exhibits no edema. Skin: Skin is warm and dry. He is not diaphoretic. Assessment:        Primary Problem  Sepsis [facility])    Active Hospital Problems    Diagnosis Date Noted    Thrombocytopenia ([facility] [D69.6] 02/22/2018    Sepsis ([facility] [A41.9] 02/21/2018    Diabetes mellitus type 2, insulin dependent ([facility] [E11.9, Z79.4] 02/21/2018    Hypertension [I10]     Hyperlipidemia [E78.5]     COPD (chronic obstructive pulmonary disease) ([facility] [J44.9]        Plan:         Acute on chronic hypoxic & hypercapneic respiratory failure  - COPD on home O2  - RT treat and eval  - Acute worsening this am, intubated this morning  - Latest ABG 7.276/49.4/112.0/23.0 on vent    Septicemia, 2/2 presumed pneumonia vs skin lesion source  - Chest radiograph without acute pathology; CT without evidence of PE  - 2D echo mild LV hypertrophy, EF 50-55%, mild pulmonary HTN  - (-) influenza, Strep pneumo, Legionella, Mycoplasma, MRSA nasal swab  - UA (-) LE, bacteria  - Blood cultures (+) S aureus x2  - ID consulted, cardiology planning LUIS when more stable  - Lactic acid 4.1 -> 2.4 -> 3.7 -> 2.1  - Sputum culture, urine culture pending  - On vancomycin, Levaquin    S/p fall x2  - Patient lightheaded, per family poor fluid intake  - No LOC, no head injury  - Likely 2/2 orthostatic hypotension, pending orthostatics  - IVF     DM II  - Lantus 20u  - Hypoglycemia protocol; POCT glucose x4     HTN  - Holding home oral antihypertensives  - Lopressor IV 5mg Q12     Thrombocytopenia  - Monitor     Hold Flomax, Proscar while kenny in. Renee Ch MD  2/24/2018  9:00 AM   Attending Physician Statement  Patient seen and examined  I have discussed the care of the patient, including pertinent history and exam findings,  with the resident. I have reviewed the key elements of all parts of the encounter with the resident.   I

## 2018-02-24 NOTE — PROGRESS NOTES
tablet TAKE 1 TABLET BY MOUTH NIGHTLY 90 tablet 1    citalopram (CELEXA) 20 MG tablet TAKE 1 TABLET BY MOUTH EVERY MORNING 90 tablet 1    PROAIR  (90 BASE) MCG/ACT inhaler INHALE 2 PUFFS USING INHALER EVERY 4 HOURS AS NEEDED 6 Inhaler 3    fluticasone-salmeterol (ADVAIR) 250-50 MCG/DOSE AEPB Inhale 1 puff into the lungs every 12 hours. (Patient taking differently: Inhale 1 puff into the lungs every 12 hours as needed ) 60 each 5    quinapril (ACCUPRIL) 40 MG tablet Take 1 tablet by mouth daily 90 tablet 3    B-D UF III MINI PEN NEEDLES 31G X 5 MM MISC USE WITH INSULIN 4 TIMES DAILY 100 each 2           Allergies  Allergies   Allergen Reactions    Ativan [Lorazepam]      hallucinations    Codeine      Cough syrup gi upset        Social   Social History   Substance Use Topics    Smoking status: Former Smoker     Packs/day: 2.00     Years: 50.00     Types: Cigarettes     Quit date: 12/14/2015    Smokeless tobacco: Never Used    Alcohol use No      Comment: rarely             Family History   Problem Relation Age of Onset    Diabetes Mother     Other Other      Testicular rhabomyosarcoma           Review of Systems        Tolerating antibiotics.          Physical Exam  BP (!) 162/83   Pulse 73   Temp 98.8 °F (37.1 °C) (Axillary)   Resp 20   Ht 5' 7\" (1.702 m)   Wt 200 lb 9.9 oz (91 kg)   SpO2 97%   BMI 31.42 kg/m²           General Appearance: INTUBATED ,SEDATED   Skin: warm and dry, no rash,upper back small stage 2 ulcer with mild surrounding erythema ,no drainage     Neck: neck supple    Pulmonary/Chest: coarse  to auscultation bilaterally-  Cardiovascular: normal rate, regular rhythm, normal S1 and S2, no murmurs  Abdomen: soft, non-distended, normal bowel sounds  Extremities: no cyanosis, clubbing or edema  Musculoskeletal:  no joint swelling  Peripheral lines and Castaneda in place    ,  CRP 72.8         Data Review:    Recent Labs      02/23/18   0530  02/23/18   2338  02/24/18   0407   WBC disease) ([facility]    Hyperlipidemia    Hypertension    Diabetes mellitus type 2, insulin dependent ([facility]    Thrombocytopenia ([facility]  H/o Bladder CA  Recommendations:   D/C IV Vancomycin    IV Ceftriaxone   LUIS on Monday  . Follow pro calcitonin level in AM  .    Sputum culture .         Vazquez Day MD

## 2018-02-25 ENCOUNTER — APPOINTMENT (OUTPATIENT)
Dept: GENERAL RADIOLOGY | Age: 78
DRG: 871 | End: 2018-02-25
Payer: MEDICARE

## 2018-02-25 LAB
ABSOLUTE EOS #: 0 K/UL (ref 0–0.4)
ABSOLUTE IMMATURE GRANULOCYTE: ABNORMAL K/UL (ref 0–0.3)
ABSOLUTE LYMPH #: 0.6 K/UL (ref 1–4.8)
ABSOLUTE MONO #: 0.3 K/UL (ref 0.1–1.3)
ALLEN TEST: NORMAL
ANION GAP SERPL CALCULATED.3IONS-SCNC: 11 MMOL/L (ref 9–17)
BASOPHILS # BLD: 0 % (ref 0–2)
BASOPHILS ABSOLUTE: 0 K/UL (ref 0–0.2)
BUN BLDV-MCNC: 23 MG/DL (ref 8–23)
BUN/CREAT BLD: ABNORMAL (ref 9–20)
CALCIUM SERPL-MCNC: 7.9 MG/DL (ref 8.6–10.4)
CARBOXYHEMOGLOBIN: 0.7 %
CHLORIDE BLD-SCNC: 106 MMOL/L (ref 98–107)
CO2: 24 MMOL/L (ref 20–31)
CREAT SERPL-MCNC: 0.76 MG/DL (ref 0.7–1.2)
DIFFERENTIAL TYPE: ABNORMAL
EOSINOPHILS RELATIVE PERCENT: 0 % (ref 0–4)
FIO2: 50
GFR AFRICAN AMERICAN: >60 ML/MIN
GFR NON-AFRICAN AMERICAN: >60 ML/MIN
GFR SERPL CREATININE-BSD FRML MDRD: ABNORMAL ML/MIN/{1.73_M2}
GFR SERPL CREATININE-BSD FRML MDRD: ABNORMAL ML/MIN/{1.73_M2}
GLUCOSE BLD-MCNC: 221 MG/DL (ref 75–110)
GLUCOSE BLD-MCNC: 235 MG/DL (ref 75–110)
GLUCOSE BLD-MCNC: 235 MG/DL (ref 75–110)
GLUCOSE BLD-MCNC: 256 MG/DL (ref 70–99)
GLUCOSE BLD-MCNC: 264 MG/DL (ref 75–110)
HCO3 ARTERIAL: 25.8 MMOL/L
HCT VFR BLD CALC: 40 % (ref 41–53)
HEMOGLOBIN: 13.4 G/DL (ref 13.5–17.5)
IMMATURE GRANULOCYTES: ABNORMAL %
LYMPHOCYTES # BLD: 7 % (ref 24–44)
MCH RBC QN AUTO: 33.4 PG (ref 26–34)
MCHC RBC AUTO-ENTMCNC: 33.4 G/DL (ref 31–37)
MCV RBC AUTO: 100.1 FL (ref 80–100)
METHEMOGLOBIN: 0.5 %
MODE: NORMAL
MONOCYTES # BLD: 4 % (ref 1–7)
NEGATIVE BASE EXCESS, ART: NORMAL MMOL/L (ref 0–2)
NOTIFICATION TIME: NORMAL
NOTIFICATION: NORMAL
NRBC AUTOMATED: ABNORMAL PER 100 WBC
O2 DEVICE/FLOW/%: NORMAL
O2 SAT, ARTERIAL: 96.5 %
OXYHEMOGLOBIN: NORMAL % (ref 95–98)
PATIENT TEMP: 37
PCO2 ARTERIAL: 41.9 MMHG
PCO2, ART, TEMP ADJ: NORMAL
PDW BLD-RTO: 13.3 % (ref 11.5–14.9)
PEEP/CPAP: 5
PH ARTERIAL: 7.4
PH, ART, TEMP ADJ: NORMAL
PLATELET # BLD: 113 K/UL (ref 150–450)
PLATELET ESTIMATE: ABNORMAL
PMV BLD AUTO: 8.9 FL (ref 6–12)
PO2 ARTERIAL: 92.4 MMHG
PO2, ART, TEMP ADJ: NORMAL MMHG
POSITIVE BASE EXCESS, ART: 1 MMOL/L (ref 0–2)
POTASSIUM SERPL-SCNC: 3.4 MMOL/L (ref 3.7–5.3)
PROCALCITONIN: 0.32 NG/ML
PSV: NORMAL
PT. POSITION: NORMAL
RBC # BLD: 4 M/UL (ref 4.5–5.9)
RBC # BLD: ABNORMAL 10*6/UL
RESPIRATORY RATE: 23
SAMPLE SITE: NORMAL
SEG NEUTROPHILS: 89 % (ref 36–66)
SEGMENTED NEUTROPHILS ABSOLUTE COUNT: 7.9 K/UL (ref 1.3–9.1)
SET RATE: 20
SODIUM BLD-SCNC: 141 MMOL/L (ref 135–144)
TEXT FOR RESPIRATORY: NORMAL
TOTAL HB: NORMAL G/DL (ref 12–16)
TOTAL RATE: 23
VT: 500
WBC # BLD: 8.8 K/UL (ref 3.5–11)
WBC # BLD: ABNORMAL 10*3/UL

## 2018-02-25 PROCEDURE — 2500000003 HC RX 250 WO HCPCS: Performed by: INTERNAL MEDICINE

## 2018-02-25 PROCEDURE — 6360000002 HC RX W HCPCS: Performed by: STUDENT IN AN ORGANIZED HEALTH CARE EDUCATION/TRAINING PROGRAM

## 2018-02-25 PROCEDURE — 71045 X-RAY EXAM CHEST 1 VIEW: CPT

## 2018-02-25 PROCEDURE — 6370000000 HC RX 637 (ALT 250 FOR IP): Performed by: RADIOLOGY

## 2018-02-25 PROCEDURE — 6360000002 HC RX W HCPCS: Performed by: INTERNAL MEDICINE

## 2018-02-25 PROCEDURE — 94762 N-INVAS EAR/PLS OXIMTRY CONT: CPT

## 2018-02-25 PROCEDURE — 84145 PROCALCITONIN (PCT): CPT

## 2018-02-25 PROCEDURE — 36600 WITHDRAWAL OF ARTERIAL BLOOD: CPT

## 2018-02-25 PROCEDURE — 6370000000 HC RX 637 (ALT 250 FOR IP): Performed by: INTERNAL MEDICINE

## 2018-02-25 PROCEDURE — 2580000003 HC RX 258: Performed by: RADIOLOGY

## 2018-02-25 PROCEDURE — 94640 AIRWAY INHALATION TREATMENT: CPT

## 2018-02-25 PROCEDURE — 85025 COMPLETE CBC W/AUTO DIFF WBC: CPT

## 2018-02-25 PROCEDURE — 82805 BLOOD GASES W/O2 SATURATION: CPT

## 2018-02-25 PROCEDURE — 6360000002 HC RX W HCPCS: Performed by: RADIOLOGY

## 2018-02-25 PROCEDURE — 6370000000 HC RX 637 (ALT 250 FOR IP): Performed by: STUDENT IN AN ORGANIZED HEALTH CARE EDUCATION/TRAINING PROGRAM

## 2018-02-25 PROCEDURE — 94770 HC ETCO2 MONITOR DAILY: CPT

## 2018-02-25 PROCEDURE — 82947 ASSAY GLUCOSE BLOOD QUANT: CPT

## 2018-02-25 PROCEDURE — 94003 VENT MGMT INPAT SUBQ DAY: CPT

## 2018-02-25 PROCEDURE — 99291 CRITICAL CARE FIRST HOUR: CPT | Performed by: INTERNAL MEDICINE

## 2018-02-25 PROCEDURE — C9113 INJ PANTOPRAZOLE SODIUM, VIA: HCPCS | Performed by: RADIOLOGY

## 2018-02-25 PROCEDURE — 2580000003 HC RX 258: Performed by: INTERNAL MEDICINE

## 2018-02-25 PROCEDURE — 2000000000 HC ICU R&B

## 2018-02-25 PROCEDURE — 36415 COLL VENOUS BLD VENIPUNCTURE: CPT

## 2018-02-25 PROCEDURE — 80048 BASIC METABOLIC PNL TOTAL CA: CPT

## 2018-02-25 RX ORDER — CLONIDINE HYDROCHLORIDE 0.2 MG/1
0.2 TABLET ORAL 3 TIMES DAILY
Status: DISCONTINUED | OUTPATIENT
Start: 2018-02-25 | End: 2018-02-25

## 2018-02-25 RX ORDER — LISINOPRIL 20 MG/1
20 TABLET ORAL DAILY
Status: DISCONTINUED | OUTPATIENT
Start: 2018-02-25 | End: 2018-03-01

## 2018-02-25 RX ORDER — INSULIN GLARGINE 100 [IU]/ML
28 INJECTION, SOLUTION SUBCUTANEOUS NIGHTLY
Status: DISCONTINUED | OUTPATIENT
Start: 2018-02-25 | End: 2018-03-02

## 2018-02-25 RX ORDER — METOPROLOL TARTRATE 5 MG/5ML
5 INJECTION INTRAVENOUS EVERY 6 HOURS
Status: DISCONTINUED | OUTPATIENT
Start: 2018-02-25 | End: 2018-02-27

## 2018-02-25 RX ORDER — HYDRALAZINE HYDROCHLORIDE 20 MG/ML
10 INJECTION INTRAMUSCULAR; INTRAVENOUS EVERY 6 HOURS PRN
Status: DISCONTINUED | OUTPATIENT
Start: 2018-02-25 | End: 2018-03-20 | Stop reason: HOSPADM

## 2018-02-25 RX ADMIN — LISINOPRIL 20 MG: 20 TABLET ORAL at 16:13

## 2018-02-25 RX ADMIN — POLYVINYL ALCOHOL 1 DROP: 14 SOLUTION/ DROPS OPHTHALMIC at 01:30

## 2018-02-25 RX ADMIN — PROPOFOL 35 MCG/KG/MIN: 10 INJECTION, EMULSION INTRAVENOUS at 21:06

## 2018-02-25 RX ADMIN — METOPROLOL TARTRATE 5 MG: 5 INJECTION, SOLUTION INTRAVENOUS at 03:55

## 2018-02-25 RX ADMIN — IPRATROPIUM BROMIDE AND ALBUTEROL SULFATE 1 AMPULE: .5; 3 SOLUTION RESPIRATORY (INHALATION) at 15:28

## 2018-02-25 RX ADMIN — POLYVINYL ALCOHOL 1 DROP: 14 SOLUTION/ DROPS OPHTHALMIC at 17:48

## 2018-02-25 RX ADMIN — MINERAL OIL AND WHITE PETROLATUM: 150; 830 OINTMENT OPHTHALMIC at 00:00

## 2018-02-25 RX ADMIN — SODIUM CHLORIDE: 9 INJECTION, SOLUTION INTRAVENOUS at 10:48

## 2018-02-25 RX ADMIN — INSULIN LISPRO 6 UNITS: 100 INJECTION, SOLUTION INTRAVENOUS; SUBCUTANEOUS at 07:50

## 2018-02-25 RX ADMIN — IPRATROPIUM BROMIDE AND ALBUTEROL SULFATE 1 AMPULE: .5; 3 SOLUTION RESPIRATORY (INHALATION) at 23:18

## 2018-02-25 RX ADMIN — FENTANYL CITRATE 50 MCG: 50 INJECTION INTRAMUSCULAR; INTRAVENOUS at 16:08

## 2018-02-25 RX ADMIN — METHYLPREDNISOLONE SODIUM SUCCINATE 40 MG: 40 INJECTION, POWDER, FOR SOLUTION INTRAMUSCULAR; INTRAVENOUS at 03:00

## 2018-02-25 RX ADMIN — PROPOFOL 45 MCG/KG/MIN: 10 INJECTION, EMULSION INTRAVENOUS at 01:50

## 2018-02-25 RX ADMIN — ENOXAPARIN SODIUM 40 MG: 40 INJECTION SUBCUTANEOUS at 09:24

## 2018-02-25 RX ADMIN — PANTOPRAZOLE SODIUM 40 MG: 40 INJECTION, POWDER, FOR SOLUTION INTRAVENOUS at 09:15

## 2018-02-25 RX ADMIN — FENTANYL CITRATE 50 MCG: 50 INJECTION INTRAMUSCULAR; INTRAVENOUS at 19:08

## 2018-02-25 RX ADMIN — SIMVASTATIN 20 MG: 20 TABLET, FILM COATED ORAL at 19:08

## 2018-02-25 RX ADMIN — INSULIN LISPRO 6 UNITS: 100 INJECTION, SOLUTION INTRAVENOUS; SUBCUTANEOUS at 14:11

## 2018-02-25 RX ADMIN — CHLORHEXIDINE GLUCONATE 15 ML: 1.2 RINSE ORAL at 09:25

## 2018-02-25 RX ADMIN — WATER 2 G: 1 INJECTION INTRAMUSCULAR; INTRAVENOUS; SUBCUTANEOUS at 17:45

## 2018-02-25 RX ADMIN — IPRATROPIUM BROMIDE AND ALBUTEROL SULFATE 1 AMPULE: .5; 3 SOLUTION RESPIRATORY (INHALATION) at 19:29

## 2018-02-25 RX ADMIN — METHYLPREDNISOLONE SODIUM SUCCINATE 40 MG: 40 INJECTION, POWDER, FOR SOLUTION INTRAMUSCULAR; INTRAVENOUS at 09:14

## 2018-02-25 RX ADMIN — METHYLPREDNISOLONE SODIUM SUCCINATE 40 MG: 40 INJECTION, POWDER, FOR SOLUTION INTRAMUSCULAR; INTRAVENOUS at 16:11

## 2018-02-25 RX ADMIN — Medication 10 ML: at 09:15

## 2018-02-25 RX ADMIN — METOPROLOL TARTRATE 5 MG: 5 INJECTION, SOLUTION INTRAVENOUS at 07:43

## 2018-02-25 RX ADMIN — CLONIDINE HYDROCHLORIDE 0.2 MG: 0.2 TABLET ORAL at 09:32

## 2018-02-25 RX ADMIN — PROPOFOL 45 MCG/KG/MIN: 10 INJECTION, EMULSION INTRAVENOUS at 07:06

## 2018-02-25 RX ADMIN — INSULIN LISPRO 6 UNITS: 100 INJECTION, SOLUTION INTRAVENOUS; SUBCUTANEOUS at 19:08

## 2018-02-25 RX ADMIN — METOPROLOL TARTRATE 5 MG: 5 INJECTION, SOLUTION INTRAVENOUS at 14:01

## 2018-02-25 RX ADMIN — INSULIN LISPRO 6 UNITS: 100 INJECTION, SOLUTION INTRAVENOUS; SUBCUTANEOUS at 01:30

## 2018-02-25 RX ADMIN — POLYVINYL ALCOHOL 1 DROP: 14 SOLUTION/ DROPS OPHTHALMIC at 21:07

## 2018-02-25 RX ADMIN — Medication 28 UNITS: at 19:57

## 2018-02-25 RX ADMIN — POLYVINYL ALCOHOL 1 DROP: 14 SOLUTION/ DROPS OPHTHALMIC at 09:37

## 2018-02-25 RX ADMIN — MINERAL OIL AND WHITE PETROLATUM: 150; 830 OINTMENT OPHTHALMIC at 03:45

## 2018-02-25 RX ADMIN — IPRATROPIUM BROMIDE AND ALBUTEROL SULFATE 1 AMPULE: .5; 3 SOLUTION RESPIRATORY (INHALATION) at 07:34

## 2018-02-25 RX ADMIN — POLYVINYL ALCOHOL 1 DROP: 14 SOLUTION/ DROPS OPHTHALMIC at 14:08

## 2018-02-25 RX ADMIN — IPRATROPIUM BROMIDE AND ALBUTEROL SULFATE 1 AMPULE: .5; 3 SOLUTION RESPIRATORY (INHALATION) at 03:42

## 2018-02-25 RX ADMIN — PROPOFOL 35 MCG/KG/MIN: 10 INJECTION, EMULSION INTRAVENOUS at 17:52

## 2018-02-25 RX ADMIN — PROPOFOL 30 MCG/KG/MIN: 10 INJECTION, EMULSION INTRAVENOUS at 13:03

## 2018-02-25 RX ADMIN — FENTANYL CITRATE 50 MCG: 50 INJECTION INTRAMUSCULAR; INTRAVENOUS at 08:24

## 2018-02-25 RX ADMIN — MINERAL OIL AND WHITE PETROLATUM: 150; 830 OINTMENT OPHTHALMIC at 19:08

## 2018-02-25 RX ADMIN — METOPROLOL TARTRATE 5 MG: 5 INJECTION, SOLUTION INTRAVENOUS at 19:08

## 2018-02-25 RX ADMIN — METHYLPREDNISOLONE SODIUM SUCCINATE 40 MG: 40 INJECTION, POWDER, FOR SOLUTION INTRAMUSCULAR; INTRAVENOUS at 19:08

## 2018-02-25 RX ADMIN — POLYVINYL ALCOHOL 1 DROP: 14 SOLUTION/ DROPS OPHTHALMIC at 03:45

## 2018-02-25 RX ADMIN — CHLORHEXIDINE GLUCONATE 15 ML: 1.2 RINSE ORAL at 19:59

## 2018-02-25 RX ADMIN — IPRATROPIUM BROMIDE AND ALBUTEROL SULFATE 1 AMPULE: .5; 3 SOLUTION RESPIRATORY (INHALATION) at 11:43

## 2018-02-25 RX ADMIN — POTASSIUM CHLORIDE 40 MEQ: 40 SOLUTION ORAL at 09:32

## 2018-02-25 ASSESSMENT — PAIN SCALES - GENERAL
PAINLEVEL_OUTOF10: 0
PAINLEVEL_OUTOF10: 3
PAINLEVEL_OUTOF10: 3
PAINLEVEL_OUTOF10: 1
PAINLEVEL_OUTOF10: 7
PAINLEVEL_OUTOF10: 0
PAINLEVEL_OUTOF10: 3

## 2018-02-25 ASSESSMENT — PULMONARY FUNCTION TESTS
PIF_VALUE: 13
PIF_VALUE: 26
PIF_VALUE: 22
PIF_VALUE: 20
PIF_VALUE: 13

## 2018-02-25 NOTE — FLOWSHEET NOTE
· Facts: Patient vented and sedated; prayer at bedside; SC visit with wife Dimple Pal in waiting room; wife sharing stories regarding her marriage, her relationships with her , children, sibling and friends, shared details of childhood of both patient and herself; explained medical circumstances and provided medical update;     · Feelings: Wife stated she is \"stressed out\" and \"tired\";     · Family: Patient and wife have been  over 48 years; they have 4 adult children; patient has one surviving sibling;     · Addie: Wife is comforted by communion and prayer; wife has a strong devotion to [employer] because his intercession brought healing to her son;      · Follow-up:  SC will continue to follow:       02/25/18 1252   Encounter Summary   Services provided to: Patient and family together   Referral/Consult From: [facility]   Support System Spouse   Continue Visiting (2/25/18)   Complexity of Encounter Moderate   Length of Encounter 1 hour   Spiritual Assessment Completed Yes   Spiritual/Taoist   Type Spiritual support   Assessment Approachable; Loneliness; Helplessness   Intervention Active listening;Explored feelings, thoughts, concerns;Nurtured hope;Prayer;Communion;Sustaining presence/ Ministry of presence; Discussed illness/injury and it's impact; Discussed belief system/Anabaptist practices/addie;Discussed meaning/purpose;Empowerment   Outcome Comfort;Expressed gratitude;Engaged in conversation; Shared life review;Expressed feelings/needs/concerns;Coping; Hopeful;Receptive   Sacraments   Communion Family received communion

## 2018-02-25 NOTE — PROGRESS NOTES
%    Constitutional - Sedated  General Appearance  well developed, well nourished  HEENT - Life support devices in place (ET, OG),normocephalic, atraumatic. PERRLA  Lungs - Chest expands equally, no wheezes or rhonchi. Diminished at bases with some crackles,   Cardiovascular - Heart sounds are normal.  normal rate and rhythm regular, no murmur, gallop or rub. Abdomen - soft, nontender, nondistended, no masses or organomegaly  Neurologic - CN II-XII are grossly intact. There are no focal motor deficits  Skin - no bruising or bleeding  Extremities - no cyanosis, clubbing or edema    Lab Results   CBC   Lab Results   Component Value Date    WBC 12.6 02/24/2018    RBC 4.06 02/24/2018    HGB 13.6 02/24/2018    HCT 39.8 02/24/2018     02/24/2018    MCV 98.0 02/24/2018    MCH 33.5 02/24/2018    MCHC 34.2 02/24/2018    RDW 13.2 02/24/2018    NRBC 1 02/23/2018    METASPCT 1 02/22/2018    LYMPHOPCT 5 02/24/2018    MONOPCT 5 02/24/2018    BASOPCT 0 02/24/2018    MONOSABS 0.63 02/24/2018    LYMPHSABS 0.63 02/24/2018    EOSABS 0.00 02/24/2018    BASOSABS 0.00 02/24/2018    DIFFTYPE NOT REPORTED 02/24/2018       BMP Lab Results   Component Value Date     02/23/2018    K 3.8 02/23/2018     02/23/2018    CO2 21 02/23/2018    BUN 32 02/23/2018    CREATININE 0.96 02/23/2018    GLUCOSE 273 02/23/2018    CALCIUM 8.3 02/23/2018       LFTS  Lab Results   Component Value Date    ALKPHOS 56 02/21/2018    ALT 27 02/21/2018    AST 24 02/21/2018    PROT 7.1 02/21/2018    BILITOT 1.37 02/21/2018    BILIDIR 0.44 02/21/2018    IBILI 1.20 02/21/2018    LABALBU 3.6 02/21/2018       INR  Recent Labs      02/23/18   0824   PROTIME  13.4*   INR  1.3       APTT  Recent Labs      02/23/18   0824   APTT  30.3       Lactic Acid  Lab Results   Component Value Date    LACTA 1.5 02/24/2018    LACTA 2.1 02/24/2018    LACTA 3.7 02/23/2018        BNP   No results for input(s): BNP in the last 72 hours.      Cultures   Initial blood cultures are growing out methicillin sensitive staph    Radiology     Plain Films         Chest x-ray shows endotracheal tube slightly high, left lower lobe infiltrate/effusion and some mild blunting of the right diaphragm     SYSTEM ASSESSMENT    Acute hypoxic hypercapnic respiratory failure  Staph aureus bacteremia  COPD/ANTONY    Neuro   Will attempt to wean sedation slightly see if he remains agitated    Respiratory   Wean oxygen as tolerated.  Keep O2 sat > 88%  Will decrease rate and FiO2 on the vent  okay to attempt to wean, but will not extubate because he is having a LUIS tomorrow   If he tolerates weaning, we will keep him on CPAP 5 pressure support 10  Continue IV steroids and bronchodilators     Hemodynamics   Blood pressure on the high side, will address  LUIS as tomorrow  Lactic acid now normal, doesn't need daily values   Gastrointestinal/Nutrition   Continue NPO by mouth    Renal   Awaiting this a.m.  labs    Infectious Disease   Antibiotics per infectious disease  Awaiting final cultures LUIS   Hematology/Oncology   DVT prophylaxis    Endocrine   Blood sugars elevated  Place on high sliding scale   Social/Spiritual/DNR/Disposition/Other     Updated patient's wife yesterday, will speak to her again today    Critical Care Time   35 min    Electronically signed by Roxanne Del Rio MD on 2/25/2018 at 7:21 AM

## 2018-02-25 NOTE — PROGRESS NOTES
Proximal femurs appear to be grossly intact. There is an unchanged nonaggressive chondroid lesion in the right femoral neck, previously seen on CT dated 07/19/2011. Iliac arterial stents are noted. There is mild fecal material in the rectum, measuring 6.7 cm in transverse dimension. 1.  No evidence of acute pelvic fracture. 2.  Mild fecal distention of the rectum, measuring 6.7 cm in transverse dimension. 3.  Iliac arterial stents noted. Ct Head Wo Contrast    Result Date: 2/21/2018  EXAMINATION: CT OF THE HEAD WITHOUT CONTRAST  2/21/2018 4:24 pm TECHNIQUE: CT of the head was performed without the administration of intravenous contrast. Dose modulation, iterative reconstruction, and/or weight based adjustment of the mA/kV was utilized to reduce the radiation dose to as low as reasonably achievable. COMPARISON: None. HISTORY: ORDERING SYSTEM PROVIDED HISTORY: fall, hit head FINDINGS: BRAIN/VENTRICLES:  There are patient motion artifacts with image degradation. There is no acute intracranial hemorrhage, mass effect or midline shift. No abnormal extra-axial fluid collection. The gray-white differentiation is maintained without evidence of an acute infarct. There is no evidence of hydrocephalus. Cortical cerebral atrophy. Diminished attenuation of subcortical and periventricular white matter consistent with chronic microangiopathy. Several small lipomas of the falx cerebri. Arteriosclerotic calcified plaques of the carotid siphons. ORBITS: The visualized portion of the orbits demonstrate no acute abnormality. SINUSES: The visualized paranasal sinuses reveal focal mucoperiosteal thickening within the right posterior ethmoid sinus. Moderate mucoperiosteal thickening within the right sphenoid sinus. Mastoid air cells demonstrate no acute abnormality. SOFT TISSUES/SKULL:  No acute abnormality of the visualized skull or soft tissues. No acute intracranial abnormality.  Moderate mucoperiosteal thickening encounter with the resident. I agree with the assessment, plan and orders as documented by the resident. cc time > 30 min    Karolina Kolb

## 2018-02-26 ENCOUNTER — APPOINTMENT (OUTPATIENT)
Dept: GENERAL RADIOLOGY | Age: 78
DRG: 871 | End: 2018-02-26
Payer: MEDICARE

## 2018-02-26 LAB
ABSOLUTE BANDS #: 0.48 K/UL (ref 0–1)
ABSOLUTE EOS #: 0 K/UL (ref 0–0.4)
ABSOLUTE IMMATURE GRANULOCYTE: ABNORMAL K/UL (ref 0–0.3)
ABSOLUTE LYMPH #: 0.77 K/UL (ref 1–4.8)
ABSOLUTE MONO #: 0.48 K/UL (ref 0.1–1.3)
ALLEN TEST: ABNORMAL
ANION GAP SERPL CALCULATED.3IONS-SCNC: 12 MMOL/L (ref 9–17)
BANDS: 5 % (ref 0–10)
BASOPHILS # BLD: 0 % (ref 0–2)
BASOPHILS ABSOLUTE: 0 K/UL (ref 0–0.2)
BUN BLDV-MCNC: 19 MG/DL (ref 8–23)
BUN/CREAT BLD: ABNORMAL (ref 9–20)
CALCIUM IONIZED: 1.14 MMOL/L (ref 1.13–1.33)
CALCIUM SERPL-MCNC: 7.8 MG/DL (ref 8.6–10.4)
CARBOXYHEMOGLOBIN: 0.9 %
CHLORIDE BLD-SCNC: 105 MMOL/L (ref 98–107)
CO2: 28 MMOL/L (ref 20–31)
CREAT SERPL-MCNC: 0.59 MG/DL (ref 0.7–1.2)
CULTURE: ABNORMAL
DIFFERENTIAL TYPE: ABNORMAL
EKG ATRIAL RATE: 73 BPM
EKG P AXIS: 36 DEGREES
EKG P-R INTERVAL: 242 MS
EKG Q-T INTERVAL: 448 MS
EKG QRS DURATION: 152 MS
EKG QTC CALCULATION (BAZETT): 493 MS
EKG R AXIS: -23 DEGREES
EKG T AXIS: 9 DEGREES
EKG VENTRICULAR RATE: 73 BPM
EOSINOPHILS RELATIVE PERCENT: 0 % (ref 0–4)
FIO2: 40
GFR AFRICAN AMERICAN: >60 ML/MIN
GFR NON-AFRICAN AMERICAN: >60 ML/MIN
GFR SERPL CREATININE-BSD FRML MDRD: ABNORMAL ML/MIN/{1.73_M2}
GFR SERPL CREATININE-BSD FRML MDRD: ABNORMAL ML/MIN/{1.73_M2}
GLUCOSE BLD-MCNC: 156 MG/DL (ref 75–110)
GLUCOSE BLD-MCNC: 179 MG/DL (ref 75–110)
GLUCOSE BLD-MCNC: 188 MG/DL (ref 75–110)
GLUCOSE BLD-MCNC: 205 MG/DL (ref 70–99)
HCO3 ARTERIAL: 29.3 MMOL/L
HCT VFR BLD CALC: 39.4 % (ref 41–53)
HEMOGLOBIN: 13.5 G/DL (ref 13.5–17.5)
IMMATURE GRANULOCYTES: ABNORMAL %
LV EF: 50 %
LVEF MODALITY: NORMAL
LYMPHOCYTES # BLD: 8 % (ref 24–44)
Lab: ABNORMAL
Lab: ABNORMAL
MAGNESIUM: 2.5 MG/DL (ref 1.6–2.6)
MCH RBC QN AUTO: 33.5 PG (ref 26–34)
MCHC RBC AUTO-ENTMCNC: 34.3 G/DL (ref 31–37)
MCV RBC AUTO: 97.9 FL (ref 80–100)
METAMYELOCYTES ABSOLUTE COUNT: 0.19 K/UL
METAMYELOCYTES: 2 %
METHEMOGLOBIN: 0.5 %
MODE: ABNORMAL
MONOCYTES # BLD: 5 % (ref 1–7)
MORPHOLOGY: ABNORMAL
NEGATIVE BASE EXCESS, ART: ABNORMAL MMOL/L (ref 0–2)
NOTIFICATION TIME: ABNORMAL
NOTIFICATION: ABNORMAL
NRBC AUTOMATED: ABNORMAL PER 100 WBC
O2 DEVICE/FLOW/%: ABNORMAL
O2 SAT, ARTERIAL: 94 %
ORGANISM: ABNORMAL
OXYHEMOGLOBIN: ABNORMAL % (ref 95–98)
PATIENT TEMP: 37
PCO2 ARTERIAL: 43.3 MMHG
PCO2, ART, TEMP ADJ: ABNORMAL
PDW BLD-RTO: 13.2 % (ref 11.5–14.9)
PEEP/CPAP: 5
PH ARTERIAL: 7.44
PH, ART, TEMP ADJ: ABNORMAL
PHOSPHORUS: 2.1 MG/DL (ref 2.5–4.5)
PLATELET # BLD: 121 K/UL (ref 150–450)
PLATELET ESTIMATE: ABNORMAL
PMV BLD AUTO: 8.7 FL (ref 6–12)
PO2 ARTERIAL: 72.5 MMHG
PO2, ART, TEMP ADJ: ABNORMAL MMHG
POSITIVE BASE EXCESS, ART: 5.1 MMOL/L (ref 0–2)
POTASSIUM SERPL-SCNC: 3.3 MMOL/L (ref 3.7–5.3)
PSV: 12
PT. POSITION: ABNORMAL
RBC # BLD: 4.03 M/UL (ref 4.5–5.9)
RBC # BLD: ABNORMAL 10*6/UL
RESPIRATORY RATE: 17
SAMPLE SITE: ABNORMAL
SEG NEUTROPHILS: 80 % (ref 36–66)
SEGMENTED NEUTROPHILS ABSOLUTE COUNT: 7.68 K/UL (ref 1.3–9.1)
SET RATE: ABNORMAL
SODIUM BLD-SCNC: 145 MMOL/L (ref 135–144)
SPECIMEN DESCRIPTION: ABNORMAL
SPECIMEN DESCRIPTION: ABNORMAL
STATUS: ABNORMAL
TEXT FOR RESPIRATORY: ABNORMAL
TOTAL HB: ABNORMAL G/DL (ref 12–16)
TOTAL RATE: 17
VT: ABNORMAL
WBC # BLD: 9.6 K/UL (ref 3.5–11)
WBC # BLD: ABNORMAL 10*3/UL

## 2018-02-26 PROCEDURE — 6360000002 HC RX W HCPCS: Performed by: INTERNAL MEDICINE

## 2018-02-26 PROCEDURE — 6370000000 HC RX 637 (ALT 250 FOR IP): Performed by: INTERNAL MEDICINE

## 2018-02-26 PROCEDURE — 94770 HC ETCO2 MONITOR DAILY: CPT

## 2018-02-26 PROCEDURE — 2500000003 HC RX 250 WO HCPCS: Performed by: STUDENT IN AN ORGANIZED HEALTH CARE EDUCATION/TRAINING PROGRAM

## 2018-02-26 PROCEDURE — 83735 ASSAY OF MAGNESIUM: CPT

## 2018-02-26 PROCEDURE — 2580000003 HC RX 258: Performed by: RADIOLOGY

## 2018-02-26 PROCEDURE — 36600 WITHDRAWAL OF ARTERIAL BLOOD: CPT

## 2018-02-26 PROCEDURE — 2580000003 HC RX 258: Performed by: INTERNAL MEDICINE

## 2018-02-26 PROCEDURE — 36415 COLL VENOUS BLD VENIPUNCTURE: CPT

## 2018-02-26 PROCEDURE — 2580000003 HC RX 258: Performed by: STUDENT IN AN ORGANIZED HEALTH CARE EDUCATION/TRAINING PROGRAM

## 2018-02-26 PROCEDURE — 87040 BLOOD CULTURE FOR BACTERIA: CPT

## 2018-02-26 PROCEDURE — 82330 ASSAY OF CALCIUM: CPT

## 2018-02-26 PROCEDURE — B246ZZ4 ULTRASONOGRAPHY OF RIGHT AND LEFT HEART, TRANSESOPHAGEAL: ICD-10-PCS | Performed by: INTERNAL MEDICINE

## 2018-02-26 PROCEDURE — 6360000002 HC RX W HCPCS: Performed by: STUDENT IN AN ORGANIZED HEALTH CARE EDUCATION/TRAINING PROGRAM

## 2018-02-26 PROCEDURE — C9113 INJ PANTOPRAZOLE SODIUM, VIA: HCPCS | Performed by: RADIOLOGY

## 2018-02-26 PROCEDURE — 82947 ASSAY GLUCOSE BLOOD QUANT: CPT

## 2018-02-26 PROCEDURE — 94003 VENT MGMT INPAT SUBQ DAY: CPT

## 2018-02-26 PROCEDURE — 85025 COMPLETE CBC W/AUTO DIFF WBC: CPT

## 2018-02-26 PROCEDURE — 82805 BLOOD GASES W/O2 SATURATION: CPT

## 2018-02-26 PROCEDURE — 84100 ASSAY OF PHOSPHORUS: CPT

## 2018-02-26 PROCEDURE — 80048 BASIC METABOLIC PNL TOTAL CA: CPT

## 2018-02-26 PROCEDURE — 6370000000 HC RX 637 (ALT 250 FOR IP): Performed by: RADIOLOGY

## 2018-02-26 PROCEDURE — 2500000003 HC RX 250 WO HCPCS: Performed by: INTERNAL MEDICINE

## 2018-02-26 PROCEDURE — 93312 ECHO TRANSESOPHAGEAL: CPT

## 2018-02-26 PROCEDURE — 93971 EXTREMITY STUDY: CPT

## 2018-02-26 PROCEDURE — 94640 AIRWAY INHALATION TREATMENT: CPT

## 2018-02-26 PROCEDURE — 71045 X-RAY EXAM CHEST 1 VIEW: CPT

## 2018-02-26 PROCEDURE — 6360000002 HC RX W HCPCS: Performed by: RADIOLOGY

## 2018-02-26 PROCEDURE — 6370000000 HC RX 637 (ALT 250 FOR IP): Performed by: STUDENT IN AN ORGANIZED HEALTH CARE EDUCATION/TRAINING PROGRAM

## 2018-02-26 PROCEDURE — 2000000000 HC ICU R&B

## 2018-02-26 PROCEDURE — 94762 N-INVAS EAR/PLS OXIMTRY CONT: CPT

## 2018-02-26 PROCEDURE — 99233 SBSQ HOSP IP/OBS HIGH 50: CPT | Performed by: INTERNAL MEDICINE

## 2018-02-26 RX ORDER — METHYLPREDNISOLONE SODIUM SUCCINATE 40 MG/ML
20 INJECTION, POWDER, LYOPHILIZED, FOR SOLUTION INTRAMUSCULAR; INTRAVENOUS EVERY 8 HOURS
Status: DISCONTINUED | OUTPATIENT
Start: 2018-02-26 | End: 2018-02-27

## 2018-02-26 RX ADMIN — HYDRALAZINE HYDROCHLORIDE 10 MG: 20 INJECTION INTRAMUSCULAR; INTRAVENOUS at 00:34

## 2018-02-26 RX ADMIN — METOPROLOL TARTRATE 5 MG: 5 INJECTION, SOLUTION INTRAVENOUS at 18:01

## 2018-02-26 RX ADMIN — CHLORHEXIDINE GLUCONATE 15 ML: 1.2 RINSE ORAL at 07:57

## 2018-02-26 RX ADMIN — METOPROLOL TARTRATE 5 MG: 5 INJECTION, SOLUTION INTRAVENOUS at 23:58

## 2018-02-26 RX ADMIN — ENOXAPARIN SODIUM 40 MG: 40 INJECTION SUBCUTANEOUS at 07:51

## 2018-02-26 RX ADMIN — Medication 10 ML: at 20:03

## 2018-02-26 RX ADMIN — SIMVASTATIN 20 MG: 20 TABLET, FILM COATED ORAL at 20:03

## 2018-02-26 RX ADMIN — FENTANYL CITRATE 50 MCG: 50 INJECTION INTRAMUSCULAR; INTRAVENOUS at 05:20

## 2018-02-26 RX ADMIN — MINERAL OIL AND WHITE PETROLATUM: 150; 830 OINTMENT OPHTHALMIC at 04:09

## 2018-02-26 RX ADMIN — PROPOFOL 35 MCG/KG/MIN: 10 INJECTION, EMULSION INTRAVENOUS at 03:08

## 2018-02-26 RX ADMIN — MINERAL OIL AND WHITE PETROLATUM: 150; 830 OINTMENT OPHTHALMIC at 07:56

## 2018-02-26 RX ADMIN — MINERAL OIL AND WHITE PETROLATUM: 150; 830 OINTMENT OPHTHALMIC at 00:36

## 2018-02-26 RX ADMIN — INSULIN LISPRO 3 UNITS: 100 INJECTION, SOLUTION INTRAVENOUS; SUBCUTANEOUS at 20:03

## 2018-02-26 RX ADMIN — METOPROLOL TARTRATE 5 MG: 5 INJECTION, SOLUTION INTRAVENOUS at 01:13

## 2018-02-26 RX ADMIN — IPRATROPIUM BROMIDE AND ALBUTEROL SULFATE 1 AMPULE: .5; 3 SOLUTION RESPIRATORY (INHALATION) at 19:33

## 2018-02-26 RX ADMIN — Medication 28 UNITS: at 20:04

## 2018-02-26 RX ADMIN — PROPOFOL 30 MCG/KG/MIN: 10 INJECTION, EMULSION INTRAVENOUS at 08:56

## 2018-02-26 RX ADMIN — METHYLPREDNISOLONE SODIUM SUCCINATE 20 MG: 40 INJECTION, POWDER, FOR SOLUTION INTRAMUSCULAR; INTRAVENOUS at 23:58

## 2018-02-26 RX ADMIN — IPRATROPIUM BROMIDE AND ALBUTEROL SULFATE 1 AMPULE: .5; 3 SOLUTION RESPIRATORY (INHALATION) at 23:28

## 2018-02-26 RX ADMIN — Medication 10 ML: at 07:53

## 2018-02-26 RX ADMIN — METHYLPREDNISOLONE SODIUM SUCCINATE 40 MG: 40 INJECTION, POWDER, FOR SOLUTION INTRAMUSCULAR; INTRAVENOUS at 04:08

## 2018-02-26 RX ADMIN — METHYLPREDNISOLONE SODIUM SUCCINATE 20 MG: 40 INJECTION, POWDER, FOR SOLUTION INTRAMUSCULAR; INTRAVENOUS at 18:01

## 2018-02-26 RX ADMIN — HYDRALAZINE HYDROCHLORIDE 10 MG: 20 INJECTION INTRAMUSCULAR; INTRAVENOUS at 12:13

## 2018-02-26 RX ADMIN — POTASSIUM CHLORIDE 40 MEQ: 40 SOLUTION ORAL at 07:52

## 2018-02-26 RX ADMIN — IPRATROPIUM BROMIDE AND ALBUTEROL SULFATE 1 AMPULE: .5; 3 SOLUTION RESPIRATORY (INHALATION) at 11:53

## 2018-02-26 RX ADMIN — METHYLPREDNISOLONE SODIUM SUCCINATE 40 MG: 40 INJECTION, POWDER, FOR SOLUTION INTRAMUSCULAR; INTRAVENOUS at 07:53

## 2018-02-26 RX ADMIN — POLYVINYL ALCOHOL 1 DROP: 14 SOLUTION/ DROPS OPHTHALMIC at 00:35

## 2018-02-26 RX ADMIN — SODIUM PHOSPHATE, MONOBASIC, MONOHYDRATE AND SODIUM PHOSPHATE, DIBASIC, ANHYDROUS 14.37 MMOL: 276; 142 INJECTION, SOLUTION INTRAVENOUS at 20:03

## 2018-02-26 RX ADMIN — PANTOPRAZOLE SODIUM 40 MG: 40 INJECTION, POWDER, FOR SOLUTION INTRAVENOUS at 07:53

## 2018-02-26 RX ADMIN — IPRATROPIUM BROMIDE AND ALBUTEROL SULFATE 1 AMPULE: .5; 3 SOLUTION RESPIRATORY (INHALATION) at 03:16

## 2018-02-26 RX ADMIN — POLYVINYL ALCOHOL 1 DROP: 14 SOLUTION/ DROPS OPHTHALMIC at 04:09

## 2018-02-26 RX ADMIN — WATER 2 G: 1 INJECTION INTRAMUSCULAR; INTRAVENOUS; SUBCUTANEOUS at 18:02

## 2018-02-26 RX ADMIN — INSULIN LISPRO 6 UNITS: 100 INJECTION, SOLUTION INTRAVENOUS; SUBCUTANEOUS at 01:10

## 2018-02-26 RX ADMIN — METOPROLOL TARTRATE 5 MG: 5 INJECTION, SOLUTION INTRAVENOUS at 07:41

## 2018-02-26 RX ADMIN — IPRATROPIUM BROMIDE AND ALBUTEROL SULFATE 1 AMPULE: .5; 3 SOLUTION RESPIRATORY (INHALATION) at 15:27

## 2018-02-26 RX ADMIN — INSULIN LISPRO 3 UNITS: 100 INJECTION, SOLUTION INTRAVENOUS; SUBCUTANEOUS at 07:53

## 2018-02-26 RX ADMIN — LISINOPRIL 20 MG: 20 TABLET ORAL at 07:53

## 2018-02-26 RX ADMIN — IPRATROPIUM BROMIDE AND ALBUTEROL SULFATE 1 AMPULE: .5; 3 SOLUTION RESPIRATORY (INHALATION) at 07:02

## 2018-02-26 ASSESSMENT — PAIN SCALES - GENERAL
PAINLEVEL_OUTOF10: 3
PAINLEVEL_OUTOF10: 3
PAINLEVEL_OUTOF10: 7

## 2018-02-26 ASSESSMENT — PULMONARY FUNCTION TESTS
PIF_VALUE: 13

## 2018-02-26 NOTE — PROGRESS NOTES
1005 LUIS done - Full vent support during procedure with bedside assessment/monitoring.   Electronically signed by Gurmeet martinez RCP on 2/26/2018 at 10:18 AM

## 2018-02-26 NOTE — PROGRESS NOTES
[facility]    Progress Note    2/26/2018    8:57 AM    Name:   Guillaume Jade  MRN:     [id number]     Acct:      [de-identified]   Room:   2003/2003-01   Day:  5  Admit Date:  2/21/2018  2:48 PM    PCP:   Ha Glass DO  Code Status:  Full Code    Subjective:     C/C:   Chief Complaint   Patient presents with    Shortness of Breath     Interval History Status: not changed. Patient seen and assessed at bedside. No acute overnight events. Continues to be intubated, though alert this morning. Indicates that he is in no pain, throat discomfort. Shakes head and squeezes hands appropriately, attempts to write on a clipboard this morning. BP steady overnight. Plan for LUIS today, possible extubation after. Brief History:     The patient is a 76 y.o. Non-/non  male who presents with Shortness of Breath and he is admitted to the hospital for the management of SOB and lightheadedness. Hx COPD, DM II, HTN, HLD; hx nephrolithiasis and bladder cysts with chronic UTIs per family. Per patient felt lightheaded while using the bathroom, dizziness without syncope, fell from toilet today sdfand was unable to get up. No LOC. Low fever and chills for past 1-2 days. Rhinorrhea and congestion. Nausea without emesis today. Chronic SOB, on 2L home O2 daytime with exercise and nighttime, but patient only using at nighttime. Baseline sputum production, usually unable to bring up. No chest pain. No abd pain. No flank pain. Per patient no change in fluid intake or urination, but per family patient not keeping hydrated. No diarrhea/constipation. Review of Systems:     Review of Systems   Unable to perform ROS: Intubated (Alert his morning. Shakes head to indicate he is in no pain.)       Medications: Allergies:     Allergies   Allergen Reactions    Ativan [Lorazepam]      hallucinations    Codeine      Cough syrup gi upset Current Meds:   Scheduled Meds:    metoprolol  5 mg Intravenous Q6H    insulin lispro  0-18 Units Subcutaneous Q6H    lisinopril  20 mg Oral Daily    insulin glargine  28 Units Subcutaneous Nightly    polyvinyl alcohol  1 drop Both Eyes Q4H    And    lubrifresh P.M. Both Eyes Q4H    chlorhexidine  15 mL Mouth/Throat BID    ipratropium-albuterol  1 ampule Inhalation Q4H    methylPREDNISolone  40 mg Intravenous Q6H    pantoprazole  40 mg Intravenous Daily    And    sodium chloride (PF)  10 mL Intravenous Daily    cefTRIAXone (ROCEPHIN) IV  2 g Intravenous Q24H    tamsulosin  0.4 mg Oral Daily    sodium chloride flush  10 mL Intravenous 2 times per day    enoxaparin  40 mg Subcutaneous Daily    finasteride  5 mg Oral Daily    simvastatin  20 mg Oral Nightly     Continuous Infusions:    propofol 35 mcg/kg/min (02/26/18 0308)    sodium chloride 75 mL/hr at 02/25/18 1048    dextrose       PRN Meds: hydrALAZINE, fentanNYL, ondansetron, albuterol sulfate HFA, sodium chloride flush, acetaminophen, sodium phosphate IVPB **OR** sodium phosphate IVPB, potassium chloride **OR** potassium chloride **OR** potassium chloride, magnesium sulfate, glucose, dextrose, glucagon (rDNA), dextrose, albuterol    Data:     Past Medical History:   has a past medical history of Abdominal aortic aneurysm (AAA) ([facility] Alveolar hypoventilation; Asthma; BCC (basal cell carcinoma of skin); Bladder cancer [facility]); BPH (benign prostatic hyperplasia); Cataracts, bilateral; Chronic constipation; Chronic cor pulmonale ([facility] Chronic hypoxemic respiratory failure (HCC); COPD (chronic obstructive pulmonary disease) ([facility] Depression; Diverticulitis; GERD (gastroesophageal reflux disease); Hard of hearing; History of kidney stones; History of nasal obstruction; History of smoking; Hoarseness; Hyperlipidemia; Hypertension;  Hypertrophy of nasal turbinates; Irritable bowel; MDRO (multiple drug resistant organisms) resistance; Mild tissues. No acute intracranial abnormality. Moderate mucoperiosteal thickening in the sphenoid sinus. Ct Cervical Spine Wo Contrast    Result Date: 2/21/2018  EXAMINATION: CT OF THE CERVICAL SPINE WITHOUT CONTRAST 2/21/2018 4:25 pm TECHNIQUE: CT of the cervical spine was performed without the administration of intravenous contrast. Multiplanar reformatted images are provided for review. Dose modulation, iterative reconstruction, and/or weight based adjustment of the mA/kV was utilized to reduce the radiation dose to as low as reasonably achievable. COMPARISON: None. HISTORY: ORDERING SYSTEM PROVIDED HISTORY: fall History of bladder cancer, osteoarthritis, skin cancer. FINDINGS: BONES/ALIGNMENT: There is no evidence of an acute cervical spine fracture. There is normal alignment of the cervical spine. DEGENERATIVE CHANGES: Advanced multilevel degenerative changes, most severe C4-C7 with marked disc space narrowing and endplate degenerative sclerosis with disc osteophyte complex type findings C5-C6 and C6-C7. Mild impingement on the canal and moderate -severe impingement on the neural foramina noted, the latter greatest on the left at C5 -C6 and C6 -C7. Mild multilevel facet arthropathy and additional spondylitic changes. Some degenerative change C1-C2 with mild pannus formation. No bony erosion. Some degenerative change noted visualized thoracic spine with a small lucency T2 on the left, and some lucency within the pedicle at T3 on the same side. SOFT TISSUES: There is no prevertebral soft tissue swelling. Mucosal thickening visualized sphenoid sinus. No air-fluid level noted. Carotid bifurcation vascular calcifications. Emphysematous changes mid upper visualized lungs. No acute abnormality of the cervical spine. Advanced multilevel degenerative changes, most severe C5-C7 with associated findings, as above. Emphysema/ COPD. Nonacute appearing sphenoid sinus disease.  Several small bony lucencies left exhibits no discharge. Left eye exhibits no discharge. Cardiovascular: Intact distal pulses. Distant heart sounds   Pulmonary/Chest: Effort normal. No respiratory distress. He has no wheezes. Abdominal: Soft. He exhibits no distension. Musculoskeletal: He exhibits no edema. Neurological:   Sedated on propofol   Skin: Skin is warm and dry. He is not diaphoretic. Assessment:        Primary Problem  Sepsis [facility])    Active Hospital Problems    Diagnosis Date Noted    Bacteremia [R78.81]     Thrombocytopenia ([facility] [D69.6] 02/22/2018    [facility] [A41.9] 02/21/2018    Diabetes mellitus type 2, insulin dependent ([facility] [E11.9, Z79.4] 02/21/2018    Hypertension [I10]     Hyperlipidemia [E78.5]     COPD (chronic obstructive pulmonary disease) ([facility] [J44.9]        Plan:         Acute on chronic hypoxic & hypercapneic respiratory failure  - COPD on home O2  - LLB airspace disease with partial clearing  - Continues intubation  - Possible extubation after LUIS today    Septicemia, 2/2 presumed pneumonia vs skin lesion source  - Chest radiograph without acute pathology; CT without evidence of PE  - 2D echo mild LV hypertrophy, EF 50-55%, mild pulmonary HTN  - (-) influenza, Strep pneumo, Legionella, Mycoplasma, MRSA nasal swab  - UA (-) LE, bacteria  - Sputum culture, urine culture pending  - Blood cultures 2/2 MSSA; second set blood cultures 2/2 (+) S aureus; third set blood cultures pending  - ID consulted, cardiology planning LUIS  - On Rocephin     HTN  - Lopressor IV 5mg Q12  - Lisinopril 20mg  - /80s overnight    DM II  - Lantus 28 QHS  - Hypoglycemia protocol; POCT glucose x4    Thrombocytopenia, chronic  - HCV negative  - Steady 90-110s, monitor    S/p fall x2  - Patient lightheaded, per family poor fluid intake  - No LOC, no head injury  - IVF    Hold Flomax, Proscar while kenny in.       iGa Verdin MD  2/26/2018  8:56 AM       IM Attending    Pt seen and examined today   I have discussed the care of pt , including pertinent history and exam findings,  with the resident. I have reviewed the key elements of all parts of the encounter with the resident. I agree with the assessment, plan and orders as documented by the resident.     Electronically signed by Gunner Ayala MD on 2/26/2018 at 11:37 AM

## 2018-02-26 NOTE — PROGRESS NOTES
Patient reassessed at bedside. Extubated on NC, resting comfortable on bed. Discussed results of LUIS with patient and daughter. Discussed continuing antibiotics. Duskiness noted in right hand. Worsening bruising noted yesterday with venous doppler RUE ordered to r/o clot. Read pending. Patchy bruising noted in right wrist, arm, left wrist, and left arm. Likely 2/2 wrist restraints and prior episodes of confusion upon which patient may have hit upper extremities. Good pulses bilateral UE. No petechia or ecchymoses noted on thorax or on lower extremities.

## 2018-02-26 NOTE — PROGRESS NOTES
200 Pt extubated and placed on NC 4L/m per order Dr Brian Rincon  SpO2 93%  Electronically signed by Scott martinez RCP on 2/26/2018 at 1:36 PM

## 2018-02-26 NOTE — PROGRESS NOTES
1250 Pt's SpO2 dropping - 86% despite increase in NC to 5L/m.   Placed on 55% VM  SpO2 93%  Electronically signed by James martinez RCP on 2/26/2018 at 1:36 PM

## 2018-02-26 NOTE — PROGRESS NOTES
Infectious disease Consult Note      Patient: Dread Balderrama  : 1940  Acct#:  [id number]     Date:  2018    Subjective:       History of Present Illness  Patient is a 76 y.o.  male [facility] [A41.9] who is seen in consult for the same . The patient was brought to the ER after a fall at the bathroom with no loss consciousness . He had body ache ,fever and chills ,chronic cough and SOB, not feeling well for the last 2 days  . He lives at home with his wife with no recent travel or sick contact . Blood cultures on admission grew MSSA . Lactic acid was elevated ,BP stable ,CT chest showed  Opacity in the right posterolateral trachea ,no PE. Echocardiogram showed no vegetation . He had H/O MRSA left elbow infection several years ago was treated at [facility] .  H/O DM   H/o Bladder CA ,UA unremarkable     Today   He remains  intubated . No reported fever ,minimal resp secration . Second set  of blood cultures  positive   Past Medical History:   Diagnosis Date    Abdominal aortic aneurysm (AAA) ([facility]     small, post endovascular repair    Alveolar hypoventilation     on oxygen    Asthma     BCC (basal cell carcinoma of skin)     Bladder cancer (HCC)     BPH (benign prostatic hyperplasia)     Cataracts, bilateral     hx of    Chronic constipation     Chronic cor pulmonale (HCC)     on nocturnal oxygen    Chronic hypoxemic respiratory failure (HCC)     COPD (chronic obstructive pulmonary disease) (HCC)     stage II    Depression     Diverticulitis     GERD (gastroesophageal reflux disease)     Hard of hearing     both ears    History of kidney stones     passed on own years ago    History of nasal obstruction     History of smoking     Hoarseness     multifactorial    Hyperlipidemia     Hypertension     Hypertrophy of nasal turbinates     Irritable bowel     MDRO (multiple drug resistant organisms) resistance 2015    MRSA?    

## 2018-02-26 NOTE — PROGRESS NOTES
ICU Progress Note (Vent)   [facility]ulmonary [facility] Care Specialists    Patient - Carmen Ball,  Age - 76 y.o.    - 1940      Room Number -    N -  [id number]   [facility]  Date of Admission -  2018  2:48 PM     Follow-up: Acute respiratory failure    Events of Past 24 Hours   Patient did good on his weaning trial earlier, he was placed back on full support and Diprivan drip for the LUIS which showed no vegetation or thrombus and preserved LV function  On Diprivan drip now and normal saline  Good cough and not much secretions  No feeding or BM  Adequate urine output    Vitals    height is 5' 7\" (1.702 m) and weight is 204 lb 9.4 oz (92.8 kg). His oral temperature is 98.5 °F (36.9 °C). His blood pressure is 168/68 (abnormal) and his pulse is 78. His respiration is 21 and oxygen saturation is 100%. Temperature Range: Temp: 98.5 °F (36.9 °C) Temp  Av °F (36.7 °C)  Min: 97.6 °F (36.4 °C)  Max: 98.5 °F (36.9 °C)  BP Range:  Systolic (55DRH), RER:084 , Min:126 , YOJ:762     Diastolic (58XLQ), SELAM:06, Min:60, Max:96    Pulse Range: Pulse  Av.7  Min: 69  Max: 89  Respiration Range: Resp  Av.1  Min: 10  Max: 24  Current Pulse Ox[de-identified]  SpO2: 100 %  24HR Pulse Ox Range:  SpO2  Av.4 %  Min: 92 %  Max: 100 %  Oxygen Amount and Delivery: O2 Flow Rate (L/min): 3 L/min      Wt Readings from Last 3 Encounters:   18 204 lb 9.4 oz (92.8 kg)   17 194 lb 9.6 oz (88.3 kg)   17 180 lb (81.6 kg)     I/O     Intake/Output Summary (Last 24 hours) at 18 1143  Last data filed at 18 0750   Gross per 24 hour   Intake             2372 ml   Output             2770 ml   Net             -398 ml     I/O last 3 completed shifts: In: 4901 [I.V.:2172;  NG/GT:160]  Out: 5 [Urine:2570; Emesis/NG output:200]     DRAIN/TUBE OUTPUT:     Invasive Lines   ETT Day -     Lines -      ICP PRESSURE RANGE:  No Data Recorded  CVP PRESSURE RANGE:  No Data IBILI 1.20 02/21/2018    LABALBU 3.6 02/21/2018       INR  No results for input(s): PROTIME, INR in the last 72 hours. APTT  No results for input(s): APTT in the last 72 hours. Lactic Acid  Lab Results   Component Value Date    LACTA 1.5 02/24/2018    LACTA 2.1 02/24/2018    LACTA 3.7 02/23/2018        BNP   No results for input(s): BNP in the last 72 hours. Cultures       Radiology     Plain Films: Bibasilar linear atelectasis         CT Scans    See actual reports for details    SYSTEM ASSESSMENT      Neuro       Respiratory   Wean oxygen as tolerated.  Keep O2 sat > 88%       Hemodynamics       Gastrointestinal/Nutrition       Renal       Infectious Disease       Hematology/Oncology       Endocrine       Social/Spiritual/DNR/Disposition/Other   Acute on chronic respiratory failure with hypoxia day #3 on vent  COPD  Hypercapnia  ANTONY  MSSA bacteremia negative vegetation on LUIS  Thrombocytopenia/chronic  History of hypertension/diabetes    Plan:  We'll extubate, use Precedex if needed  Bronchodilators, decreased steroids  Antibiotic per ID  On Lovenox and Protonix  Discussed with staff    Critical Care Time   >30 min    Electronically signed by Libby Torres MD on 2/26/2018 at 11:43 AM

## 2018-02-27 LAB
ABSOLUTE EOS #: 0 K/UL (ref 0–0.4)
ABSOLUTE IMMATURE GRANULOCYTE: ABNORMAL K/UL (ref 0–0.3)
ABSOLUTE LYMPH #: 0.64 K/UL (ref 1–4.8)
ABSOLUTE MONO #: 0.86 K/UL (ref 0.1–1.3)
ANION GAP SERPL CALCULATED.3IONS-SCNC: 7 MMOL/L (ref 9–17)
BASOPHILS # BLD: 0 % (ref 0–2)
BASOPHILS ABSOLUTE: 0 K/UL (ref 0–0.2)
BUN BLDV-MCNC: 21 MG/DL (ref 8–23)
BUN/CREAT BLD: ABNORMAL (ref 9–20)
CALCIUM SERPL-MCNC: 7.8 MG/DL (ref 8.6–10.4)
CHLORIDE BLD-SCNC: 104 MMOL/L (ref 98–107)
CO2: 31 MMOL/L (ref 20–31)
CREAT SERPL-MCNC: 0.76 MG/DL (ref 0.7–1.2)
DIFFERENTIAL TYPE: ABNORMAL
EOSINOPHILS RELATIVE PERCENT: 0 % (ref 0–4)
GFR AFRICAN AMERICAN: >60 ML/MIN
GFR NON-AFRICAN AMERICAN: >60 ML/MIN
GFR SERPL CREATININE-BSD FRML MDRD: ABNORMAL ML/MIN/{1.73_M2}
GFR SERPL CREATININE-BSD FRML MDRD: ABNORMAL ML/MIN/{1.73_M2}
GLUCOSE BLD-MCNC: 123 MG/DL (ref 70–99)
GLUCOSE BLD-MCNC: 123 MG/DL (ref 75–110)
GLUCOSE BLD-MCNC: 135 MG/DL (ref 75–110)
GLUCOSE BLD-MCNC: 200 MG/DL (ref 75–110)
GLUCOSE BLD-MCNC: 215 MG/DL (ref 75–110)
HCT VFR BLD CALC: 38.5 % (ref 41–53)
HEMOGLOBIN: 13.2 G/DL (ref 13.5–17.5)
IMMATURE GRANULOCYTES: ABNORMAL %
LYMPHOCYTES # BLD: 6 % (ref 24–44)
MCH RBC QN AUTO: 33.8 PG (ref 26–34)
MCHC RBC AUTO-ENTMCNC: 34.4 G/DL (ref 31–37)
MCV RBC AUTO: 98.4 FL (ref 80–100)
MONOCYTES # BLD: 8 % (ref 1–7)
MORPHOLOGY: NORMAL
NRBC AUTOMATED: ABNORMAL PER 100 WBC
PDW BLD-RTO: 13.4 % (ref 11.5–14.9)
PLATELET # BLD: 127 K/UL (ref 150–450)
PLATELET ESTIMATE: ABNORMAL
PMV BLD AUTO: 9 FL (ref 6–12)
POTASSIUM SERPL-SCNC: 3.6 MMOL/L (ref 3.7–5.3)
RBC # BLD: 3.91 M/UL (ref 4.5–5.9)
RBC # BLD: ABNORMAL 10*6/UL
SEG NEUTROPHILS: 86 % (ref 36–66)
SEGMENTED NEUTROPHILS ABSOLUTE COUNT: 9.2 K/UL (ref 1.3–9.1)
SODIUM BLD-SCNC: 142 MMOL/L (ref 135–144)
WBC # BLD: 10.7 K/UL (ref 3.5–11)
WBC # BLD: ABNORMAL 10*3/UL

## 2018-02-27 PROCEDURE — 2060000000 HC ICU INTERMEDIATE R&B

## 2018-02-27 PROCEDURE — 6370000000 HC RX 637 (ALT 250 FOR IP): Performed by: STUDENT IN AN ORGANIZED HEALTH CARE EDUCATION/TRAINING PROGRAM

## 2018-02-27 PROCEDURE — 2580000003 HC RX 258: Performed by: STUDENT IN AN ORGANIZED HEALTH CARE EDUCATION/TRAINING PROGRAM

## 2018-02-27 PROCEDURE — 6360000002 HC RX W HCPCS: Performed by: STUDENT IN AN ORGANIZED HEALTH CARE EDUCATION/TRAINING PROGRAM

## 2018-02-27 PROCEDURE — 6360000002 HC RX W HCPCS: Performed by: INTERNAL MEDICINE

## 2018-02-27 PROCEDURE — 6370000000 HC RX 637 (ALT 250 FOR IP): Performed by: NURSE PRACTITIONER

## 2018-02-27 PROCEDURE — 2580000003 HC RX 258: Performed by: INTERNAL MEDICINE

## 2018-02-27 PROCEDURE — 6360000002 HC RX W HCPCS: Performed by: RADIOLOGY

## 2018-02-27 PROCEDURE — 6370000000 HC RX 637 (ALT 250 FOR IP): Performed by: RADIOLOGY

## 2018-02-27 PROCEDURE — 94762 N-INVAS EAR/PLS OXIMTRY CONT: CPT

## 2018-02-27 PROCEDURE — 97110 THERAPEUTIC EXERCISES: CPT

## 2018-02-27 PROCEDURE — 82947 ASSAY GLUCOSE BLOOD QUANT: CPT

## 2018-02-27 PROCEDURE — 99233 SBSQ HOSP IP/OBS HIGH 50: CPT | Performed by: INTERNAL MEDICINE

## 2018-02-27 PROCEDURE — 6370000000 HC RX 637 (ALT 250 FOR IP): Performed by: INTERNAL MEDICINE

## 2018-02-27 PROCEDURE — 94640 AIRWAY INHALATION TREATMENT: CPT

## 2018-02-27 PROCEDURE — 2580000003 HC RX 258: Performed by: RADIOLOGY

## 2018-02-27 PROCEDURE — C9113 INJ PANTOPRAZOLE SODIUM, VIA: HCPCS | Performed by: RADIOLOGY

## 2018-02-27 PROCEDURE — 36415 COLL VENOUS BLD VENIPUNCTURE: CPT

## 2018-02-27 PROCEDURE — 2500000003 HC RX 250 WO HCPCS: Performed by: INTERNAL MEDICINE

## 2018-02-27 PROCEDURE — 85025 COMPLETE CBC W/AUTO DIFF WBC: CPT

## 2018-02-27 PROCEDURE — 80048 BASIC METABOLIC PNL TOTAL CA: CPT

## 2018-02-27 RX ORDER — ROPINIROLE 2 MG/1
2 TABLET, FILM COATED ORAL NIGHTLY
Status: DISCONTINUED | OUTPATIENT
Start: 2018-02-27 | End: 2018-03-20 | Stop reason: HOSPADM

## 2018-02-27 RX ORDER — FAMOTIDINE 20 MG/1
20 TABLET, FILM COATED ORAL 2 TIMES DAILY
Status: DISCONTINUED | OUTPATIENT
Start: 2018-02-27 | End: 2018-02-27

## 2018-02-27 RX ORDER — PREDNISONE 20 MG/1
20 TABLET ORAL DAILY
Status: DISCONTINUED | OUTPATIENT
Start: 2018-02-27 | End: 2018-03-01

## 2018-02-27 RX ORDER — FAMOTIDINE 20 MG/1
20 TABLET, FILM COATED ORAL 2 TIMES DAILY
Status: DISCONTINUED | OUTPATIENT
Start: 2018-02-28 | End: 2018-03-07

## 2018-02-27 RX ADMIN — IPRATROPIUM BROMIDE AND ALBUTEROL SULFATE 1 AMPULE: .5; 3 SOLUTION RESPIRATORY (INHALATION) at 16:43

## 2018-02-27 RX ADMIN — ENOXAPARIN SODIUM 40 MG: 40 INJECTION SUBCUTANEOUS at 08:01

## 2018-02-27 RX ADMIN — TAMSULOSIN HYDROCHLORIDE 0.4 MG: 0.4 CAPSULE ORAL at 08:00

## 2018-02-27 RX ADMIN — ROPINIROLE HYDROCHLORIDE 2 MG: 2 TABLET, FILM COATED ORAL at 00:45

## 2018-02-27 RX ADMIN — METOPROLOL TARTRATE 25 MG: 25 TABLET, FILM COATED ORAL at 11:51

## 2018-02-27 RX ADMIN — Medication 10 ML: at 08:02

## 2018-02-27 RX ADMIN — PANTOPRAZOLE SODIUM 40 MG: 40 INJECTION, POWDER, FOR SOLUTION INTRAVENOUS at 08:01

## 2018-02-27 RX ADMIN — Medication 10 ML: at 08:00

## 2018-02-27 RX ADMIN — IPRATROPIUM BROMIDE AND ALBUTEROL SULFATE 1 AMPULE: .5; 3 SOLUTION RESPIRATORY (INHALATION) at 19:07

## 2018-02-27 RX ADMIN — METOPROLOL TARTRATE 25 MG: 25 TABLET, FILM COATED ORAL at 21:02

## 2018-02-27 RX ADMIN — PREDNISONE 20 MG: 20 TABLET ORAL at 11:50

## 2018-02-27 RX ADMIN — FINASTERIDE 5 MG: 5 TABLET, FILM COATED ORAL at 08:00

## 2018-02-27 RX ADMIN — METHYLPREDNISOLONE SODIUM SUCCINATE 20 MG: 40 INJECTION, POWDER, FOR SOLUTION INTRAMUSCULAR; INTRAVENOUS at 08:00

## 2018-02-27 RX ADMIN — WATER 2 G: 1 INJECTION INTRAMUSCULAR; INTRAVENOUS; SUBCUTANEOUS at 18:27

## 2018-02-27 RX ADMIN — LISINOPRIL 20 MG: 20 TABLET ORAL at 08:00

## 2018-02-27 RX ADMIN — Medication 28 UNITS: at 21:02

## 2018-02-27 RX ADMIN — IPRATROPIUM BROMIDE AND ALBUTEROL SULFATE 1 AMPULE: .5; 3 SOLUTION RESPIRATORY (INHALATION) at 03:09

## 2018-02-27 RX ADMIN — Medication 10 ML: at 22:01

## 2018-02-27 RX ADMIN — ROPINIROLE HYDROCHLORIDE 2 MG: 2 TABLET, FILM COATED ORAL at 22:59

## 2018-02-27 RX ADMIN — IPRATROPIUM BROMIDE AND ALBUTEROL SULFATE 1 AMPULE: .5; 3 SOLUTION RESPIRATORY (INHALATION) at 11:39

## 2018-02-27 RX ADMIN — IPRATROPIUM BROMIDE AND ALBUTEROL SULFATE 1 AMPULE: .5; 3 SOLUTION RESPIRATORY (INHALATION) at 08:00

## 2018-02-27 RX ADMIN — HYDRALAZINE HYDROCHLORIDE 10 MG: 20 INJECTION INTRAMUSCULAR; INTRAVENOUS at 08:56

## 2018-02-27 RX ADMIN — INSULIN LISPRO 3 UNITS: 100 INJECTION, SOLUTION INTRAVENOUS; SUBCUTANEOUS at 21:01

## 2018-02-27 RX ADMIN — METOPROLOL TARTRATE 5 MG: 5 INJECTION, SOLUTION INTRAVENOUS at 06:12

## 2018-02-27 RX ADMIN — SIMVASTATIN 20 MG: 20 TABLET, FILM COATED ORAL at 21:02

## 2018-02-27 RX ADMIN — INSULIN LISPRO 6 UNITS: 100 INJECTION, SOLUTION INTRAVENOUS; SUBCUTANEOUS at 14:50

## 2018-02-27 RX ADMIN — SODIUM CHLORIDE: 9 INJECTION, SOLUTION INTRAVENOUS at 05:41

## 2018-02-27 RX ADMIN — ACETAMINOPHEN 650 MG: 325 TABLET, FILM COATED ORAL at 11:53

## 2018-02-27 ASSESSMENT — ENCOUNTER SYMPTOMS
SPUTUM PRODUCTION: 0
COUGH: 0
ABDOMINAL PAIN: 0
SHORTNESS OF BREATH: 0
NAUSEA: 0
VOMITING: 0

## 2018-02-27 ASSESSMENT — PAIN DESCRIPTION - PAIN TYPE: TYPE: ACUTE PAIN

## 2018-02-27 ASSESSMENT — PAIN DESCRIPTION - ORIENTATION: ORIENTATION: LEFT

## 2018-02-27 ASSESSMENT — PAIN SCALES - GENERAL
PAINLEVEL_OUTOF10: 3
PAINLEVEL_OUTOF10: 0
PAINLEVEL_OUTOF10: 3
PAINLEVEL_OUTOF10: 5

## 2018-02-27 ASSESSMENT — PAIN DESCRIPTION - LOCATION: LOCATION: BACK

## 2018-02-27 NOTE — PROGRESS NOTES
Infectious disease Consult Note      Patient: Sheryl Segura  : 1940  Acct#:  [id number]     Date:  2018    Subjective:       History of Present Illness  Patient is a 76 y.o.  male admitted with Sepsis ([facility] [A41.9] who is seen in consult for the same . The patient was brought to the ER after a fall at the bathroom with no loss consciousness . He had body ache ,fever and chills ,chronic cough and SOB, not feeling well for the last 2 days  . He lives at home with his wife with no recent travel or sick contact . Blood cultures on admission grew MSSA . Lactic acid was elevated ,BP stable ,CT chest showed  Opacity in the right posterolateral trachea ,no PE. Echocardiogram showed no vegetation . He had H/O MRSA left elbow infection several years ago was treated at [facility] .  H/O DM   H/o Bladder CA ,UA unremarkable     Today   He was extubated . denied  fever or chills ,no complaints . Second set  of blood cultures  positive   Blood cultures  pending   LUIS no reported vegetations .   Past Medical History:   Diagnosis Date    Abdominal aortic aneurysm (AAA) ([facility]     small, post endovascular repair    Alveolar hypoventilation     on oxygen    Asthma     BCC (basal cell carcinoma of skin)     Bladder cancer (HCC)     BPH (benign prostatic hyperplasia)     Cataracts, bilateral     hx of    Chronic constipation     Chronic cor pulmonale (HCC)     on nocturnal oxygen    Chronic hypoxemic respiratory failure (HCC)     COPD [facility]chronic obstructive pulmonary disease) ([facility]     stage II    Depression     Diverticulitis     GERD (gastroesophageal reflux disease)     Hard of hearing     both ears    History of kidney stones     passed on own years ago    History of nasal obstruction     History of smoking     Hoarseness     multifactorial    Hyperlipidemia     Hypertension     Hypertrophy of nasal turbinates     Irritable bowel     MDRO (multiple drug (before meals) 5 Pen 5    lovastatin (MEVACOR) 40 MG tablet TAKE 1 TABLET BY MOUTH NIGHTLY 90 tablet 1    citalopram (CELEXA) 20 MG tablet TAKE 1 TABLET BY MOUTH EVERY MORNING 90 tablet 1    PROAIR  (90 BASE) MCG/ACT inhaler INHALE 2 PUFFS USING INHALER EVERY 4 HOURS AS NEEDED 6 Inhaler 3    fluticasone-salmeterol (ADVAIR) 250-50 MCG/DOSE AEPB Inhale 1 puff into the lungs every 12 hours. (Patient taking differently: Inhale 1 puff into the lungs every 12 hours as needed ) 60 each 5    quinapril (ACCUPRIL) 40 MG tablet Take 1 tablet by mouth daily 90 tablet 3    B-D UF III MINI PEN NEEDLES 31G X 5 MM MISC USE WITH INSULIN 4 TIMES DAILY 100 each 2           Allergies  Allergies   Allergen Reactions    Ativan [Lorazepam]      hallucinations    Codeine      Cough syrup gi upset        Social   Social History   Substance Use Topics    Smoking status: Former Smoker     Packs/day: 2.00     Years: 50.00     Types: Cigarettes     Quit date: 12/14/2015    Smokeless tobacco: Never Used    Alcohol use No      Comment: rarely             Family History   Problem Relation Age of Onset    Diabetes Mother     Other Other      Testicular rhabomyosarcoma           Review of Systems        Tolerating antibiotics.          Physical Exam  BP (!) 180/85   Pulse 76   Temp 98 °F (36.7 °C) (Oral)   Resp 22   Ht 5' 7\" (1.702 m)   Wt 203 lb 4.2 oz (92.2 kg)   SpO2 96%   BMI 31.84 kg/m²           General Appearance: AO3 ,NAD  Skin: warm and dry, no rash,upper back small stage 2 ulcer with mild surrounding erythema ,no drainage     Neck: neck supple    Pulmonary/Chest: coarse  to auscultation bilaterally-  Cardiovascular: normal rate, regular rhythm, normal S1 and S2, no murmurs  Abdomen: soft, non-distended, normal bowel sounds  Extremities: no cyanosis, clubbing or edema  Musculoskeletal:  no joint swelling  Peripheral lines and Castaneda in place     Data Review:    Recent Labs      02/25/18   0821  02/26/18   1240 02/27/18   0427   WBC  8.8  9.6  10.7   HGB  13.4*  13.5  13.2*   HCT  40.0*  39.4*  38.5*   MCV  100.1*  97.9  98.4   PLT  113*  121*  127*     Recent Labs      02/25/18   0821  02/26/18   0415  02/27/18   0427   NA  141  145*  142   K  3.4*  3.3*  3.6*   CL  106  105  104   CO2  24  28  31   PHOS   --   2.1*   --    BUN  23  19  21   CREATININE  0.76  0.59*  0.76     No results for input(s): AST, ALT, ALB, BILIDIR, BILITOT, ALKPHOS in the last 72 hours. No results for input(s): LIPASE, AMYLASE in the last 72 hours. No results for input(s): PROTIME, INR in the last 72 hours. Imaging Studies:                           All appropriate imaging studies and reports reviewed: Yes  2/22/2018 12:45 PM - [facility] Incoming Lab Results From [facility]     Component Results     Component Value Ref Range & Units Status Collected Lab   Procalcitonin 0.58   <0.09 ng/mL Final 02/22/2018  5:15 AM - [facility] [address]  Lab          Procalcitonin values from samples collected within the first 6 hours      2/22/2018  6:39 AM - [facility] Incoming Lab Results From [facility]     Component Results     Component Collected Lab   Specimen Description 02/22/2018  5:35 AM [place] [id number] [address] [facility] Lab   . NASOPHARYNGEAL SWAB Performed at [address] [place] [address] Dr Zaid Brandt 44    Specimen Description 02/22/2018  5:35 AM [facility]. [address] [phone]    Special Requests 02/22/2018  5:35  [address] [facility]   NOT REPORTED    Direct Exam 02/22/2018  5:35 AM [address] for Influenza A + B antigens. Pro calcitonin level 0.58 on 2/21 and nasal swab for MRSA  Negative           Assessment:   Staph aureus bacteremia ,possible pulmonary source. Sepsis secondary to above resolved.   Active Problems:    COPD (chronic obstructive pulmonary disease) (HCC)    Hyperlipidemia    Hypertension    Diabetes mellitus type 2, insulin dependent ([facility] Thrombocytopenia ([place] [facility]  H/o Bladder CA  Recommendations:     IV Ceftriaxone   Follow Repeat BC         Sima Ku MD

## 2018-02-27 NOTE — PLAN OF CARE
Problem: Falls - Risk of  Goal: Absence of falls  Outcome: Ongoing  Pt assessed as a fall risk this shift. Remains free from falls and accidental injury at this time. Fall precautions in place, including falling star sign and fall risk band on pt. Floor free from obstacles, and bed is locked and in lowest position. Adequate lighting provided. Pt encouraged to call before getting OOB for any need. Bed alarm activated. Will continue to monitor needs during hourly rounding, and reinforce education on use of call light. Problem: OXYGENATION/RESPIRATORY FUNCTION  Goal: Patient will maintain patent airway  Outcome: Ongoing    Goal: Patient will achieve/maintain normal respiratory rate/effort  Respiratory rate and effort will be within normal limits for the patient   Outcome: Ongoing  Patients respiratory status stable at this time. No signs or symptoms of distress noted. Respirations non-labored and regular. Nasal canula 02 in place as needed to maintain sp02>90% or per md order. Continuous SpO2 monitoring in place. Problem: Gas Exchange - Impaired:  Goal: Levels of oxygenation will improve  Levels of oxygenation will improve   Outcome: Ongoing      Problem: Infection, Septic Shock:  Goal: Will show no infection signs and symptoms  Will show no infection signs and symptoms   Outcome: Ongoing      Problem: Pain:  Goal: Control of acute pain  Control of acute pain   Outcome: Ongoing  Pt medicated with pain medication prn. Assessed all pain characteristics including level, type, location, frequency, and onset. Non-pharmacologic interventions offered to pt as well. Pt states pain is tolerable at this time. Will continue to monitor.

## 2018-02-27 NOTE — PROGRESS NOTES
ICU Progress Note (Vent)   [facility] Care Specialists    Patient - Radha Sam,  Age - 76 y.o.    - 1940      Room Number -    MRN -  [id number]   [facility] # - [de-identified]  Date of Admission -  2018  2:48 PM     Follow-up: Acute respiratory failure    Events of Past 24 Hours   Feels fine today, no fever or chills   not much cough or wheezing, some short of breath with exertion  No dysphagia  Adequate urine output  On 4 L O2, no BiPAP needed    Vitals    height is 5' 7\" (1.702 m) and weight is 203 lb 4.2 oz (92.2 kg). His oral temperature is 98 °F (36.7 °C). His blood pressure is 180/85 (abnormal) and his pulse is 76. His respiration is 22 and oxygen saturation is 96%. Temperature Range: Temp: 98 °F (36.7 °C) Temp  Av.1 °F (36.7 °C)  Min: 98 °F (36.7 °C)  Max: 98.1 °F (36.7 °C)  BP Range:  Systolic (74AXF), VLK:631 , Min:122 , EFR:752     Diastolic (34LKQ), EEN:80, Min:65, Max:147    Pulse Range: Pulse  Av.3  Min: 67  Max: 90  Respiration Range: Resp  Av.2  Min: 10  Max: 26  Current Pulse Ox[de-identified]  SpO2: 96 %  24HR Pulse Ox Range:  SpO2  Av.9 %  Min: 85 %  Max: 100 %  Oxygen Amount and Delivery: O2 Flow Rate (L/min): 4 L/min      Wt Readings from Last 3 Encounters:   18 203 lb 4.2 oz (92.2 kg)   17 194 lb 9.6 oz (88.3 kg)   17 180 lb (81.6 kg)     I/O       Intake/Output Summary (Last 24 hours) at 18 1047  Last data filed at 18 0900   Gross per 24 hour   Intake             2042 ml   Output             2900 ml   Net             -858 ml     I/O last 3 completed shifts:   In:  [I.V.:; NG/GT:90]  Out: 2350 [Urine:2350]     DRAIN/TUBE OUTPUT:            ABGs:   Lab Results   Component Value Date    PHART 7.438 2018    PO2ART 72.5 2018    JTA2MXK 43.3 2018       Lab Results   Component Value Date    MODE CPAP 2018         Medications   IV   dexmedetomidine (PRECEDEX) IV infusion LYMPHOPCT 6 02/27/2018    MONOPCT 8 02/27/2018    BASOPCT 0 02/27/2018    MONOSABS 0.86 02/27/2018    LYMPHSABS 0.64 02/27/2018    EOSABS 0.00 02/27/2018    BASOSABS 0.00 02/27/2018    DIFFTYPE NOT REPORTED 02/27/2018       BMP   Lab Results   Component Value Date     02/27/2018    K 3.6 02/27/2018     02/27/2018    CO2 31 02/27/2018    BUN 21 02/27/2018    CREATININE 0.76 02/27/2018    GLUCOSE 123 02/27/2018    CALCIUM 7.8 02/27/2018       LFTS  Lab Results   Component Value Date    ALKPHOS 56 02/21/2018    ALT 27 02/21/2018    AST 24 02/21/2018    PROT 7.1 02/21/2018    BILITOT 1.37 02/21/2018    BILIDIR 0.44 02/21/2018    IBILI 1.20 02/21/2018    LABALBU 3.6 02/21/2018       INR  No results for input(s): PROTIME, INR in the last 72 hours. APTT  No results for input(s): APTT in the last 72 hours. Lactic Acid  Lab Results   Component Value Date    LACTA 1.5 02/24/2018    LACTA 2.1 02/24/2018    LACTA 3.7 02/23/2018        BNP   No results for input(s): BNP in the last 72 hours. Cultures       Radiology     Plain Films:          CT Scans    See actual reports for details    SYSTEM ASSESSMENT      Neuro       Respiratory   Wean oxygen as tolerated.  Keep O2 sat > 88%       Hemodynamics       Gastrointestinal/Nutrition       Renal       Infectious Disease       Hematology/Oncology       Endocrine       Social/Spiritual/DNR/Disposition/Other   Acute on chronic respiratory failure with hypoxia extubated 2/26 after 3 days on vent  COPD  Hypercapnia  ANTONY  MSSA bacteremia negative vegetation on LUIS  Thrombocytopenia/chronic  History of hypertension/diabetes    Plan:  Decrease O2 as tolerated/on 3 L at home  Bronchodilators, change to prednisone  Antibiotic per ID  On Lovenox   Discussed with staff  Okay to transfer out of ICU      Electronically signed by Carlota Estrella MD on 2/27/2018 at 10:47 AM

## 2018-02-27 NOTE — PLAN OF CARE
Problem: Nutrition  Goal: Optimal nutrition therapy  Outcome: Ongoing  Nutrition Problem: Inadequate oral intake  Intervention: Food and/or Nutrient Delivery: Continue current diet  Nutritional Goals: adequate nutriton provision

## 2018-02-28 ENCOUNTER — APPOINTMENT (OUTPATIENT)
Dept: GENERAL RADIOLOGY | Age: 78
DRG: 871 | End: 2018-02-28
Payer: MEDICARE

## 2018-02-28 LAB
ABSOLUTE EOS #: 0 K/UL (ref 0–0.4)
ABSOLUTE IMMATURE GRANULOCYTE: ABNORMAL K/UL (ref 0–0.3)
ABSOLUTE LYMPH #: 1.4 K/UL (ref 1–4.8)
ABSOLUTE MONO #: 0.8 K/UL (ref 0.1–1.3)
BASOPHILS # BLD: 0 % (ref 0–2)
BASOPHILS ABSOLUTE: 0 K/UL (ref 0–0.2)
CULTURE: ABNORMAL
DIFFERENTIAL TYPE: ABNORMAL
EOSINOPHILS RELATIVE PERCENT: 0 % (ref 0–4)
GLUCOSE BLD-MCNC: 122 MG/DL (ref 75–110)
GLUCOSE BLD-MCNC: 174 MG/DL (ref 75–110)
GLUCOSE BLD-MCNC: 207 MG/DL (ref 75–110)
GLUCOSE BLD-MCNC: 230 MG/DL (ref 75–110)
GLUCOSE BLD-MCNC: 270 MG/DL (ref 75–110)
GLUCOSE BLD-MCNC: 93 MG/DL (ref 75–110)
HCT VFR BLD CALC: 38.7 % (ref 41–53)
HEMOGLOBIN: 13.2 G/DL (ref 13.5–17.5)
IMMATURE GRANULOCYTES: ABNORMAL %
LYMPHOCYTES # BLD: 13 % (ref 24–44)
Lab: ABNORMAL
Lab: ABNORMAL
MCH RBC QN AUTO: 33.3 PG (ref 26–34)
MCHC RBC AUTO-ENTMCNC: 34.1 G/DL (ref 31–37)
MCV RBC AUTO: 97.7 FL (ref 80–100)
MONOCYTES # BLD: 8 % (ref 1–7)
NRBC AUTOMATED: ABNORMAL PER 100 WBC
PDW BLD-RTO: 13.2 % (ref 11.5–14.9)
PLATELET # BLD: 126 K/UL (ref 150–450)
PLATELET ESTIMATE: ABNORMAL
PMV BLD AUTO: 8.3 FL (ref 6–12)
RBC # BLD: 3.96 M/UL (ref 4.5–5.9)
RBC # BLD: ABNORMAL 10*6/UL
SEG NEUTROPHILS: 79 % (ref 36–66)
SEGMENTED NEUTROPHILS ABSOLUTE COUNT: 8.3 K/UL (ref 1.3–9.1)
SPECIMEN DESCRIPTION: ABNORMAL
SPECIMEN DESCRIPTION: ABNORMAL
STATUS: ABNORMAL
WBC # BLD: 10.6 K/UL (ref 3.5–11)
WBC # BLD: ABNORMAL 10*3/UL

## 2018-02-28 PROCEDURE — 6370000000 HC RX 637 (ALT 250 FOR IP): Performed by: INTERNAL MEDICINE

## 2018-02-28 PROCEDURE — 97110 THERAPEUTIC EXERCISES: CPT

## 2018-02-28 PROCEDURE — 97116 GAIT TRAINING THERAPY: CPT

## 2018-02-28 PROCEDURE — 82947 ASSAY GLUCOSE BLOOD QUANT: CPT

## 2018-02-28 PROCEDURE — 2580000003 HC RX 258: Performed by: INTERNAL MEDICINE

## 2018-02-28 PROCEDURE — 2580000003 HC RX 258: Performed by: STUDENT IN AN ORGANIZED HEALTH CARE EDUCATION/TRAINING PROGRAM

## 2018-02-28 PROCEDURE — 6370000000 HC RX 637 (ALT 250 FOR IP): Performed by: RADIOLOGY

## 2018-02-28 PROCEDURE — 36415 COLL VENOUS BLD VENIPUNCTURE: CPT

## 2018-02-28 PROCEDURE — 6370000000 HC RX 637 (ALT 250 FOR IP): Performed by: STUDENT IN AN ORGANIZED HEALTH CARE EDUCATION/TRAINING PROGRAM

## 2018-02-28 PROCEDURE — 94640 AIRWAY INHALATION TREATMENT: CPT

## 2018-02-28 PROCEDURE — 71045 X-RAY EXAM CHEST 1 VIEW: CPT

## 2018-02-28 PROCEDURE — 85025 COMPLETE CBC W/AUTO DIFF WBC: CPT

## 2018-02-28 PROCEDURE — 6360000002 HC RX W HCPCS: Performed by: INTERNAL MEDICINE

## 2018-02-28 PROCEDURE — 99232 SBSQ HOSP IP/OBS MODERATE 35: CPT | Performed by: INTERNAL MEDICINE

## 2018-02-28 PROCEDURE — 99233 SBSQ HOSP IP/OBS HIGH 50: CPT | Performed by: INTERNAL MEDICINE

## 2018-02-28 PROCEDURE — 6360000002 HC RX W HCPCS: Performed by: STUDENT IN AN ORGANIZED HEALTH CARE EDUCATION/TRAINING PROGRAM

## 2018-02-28 PROCEDURE — 2060000000 HC ICU INTERMEDIATE R&B

## 2018-02-28 RX ORDER — IPRATROPIUM BROMIDE AND ALBUTEROL SULFATE 2.5; .5 MG/3ML; MG/3ML
1 SOLUTION RESPIRATORY (INHALATION) 4 TIMES DAILY
Status: DISCONTINUED | OUTPATIENT
Start: 2018-02-28 | End: 2018-03-06

## 2018-02-28 RX ADMIN — FAMOTIDINE 20 MG: 20 TABLET, FILM COATED ORAL at 08:27

## 2018-02-28 RX ADMIN — ENOXAPARIN SODIUM 40 MG: 40 INJECTION SUBCUTANEOUS at 08:26

## 2018-02-28 RX ADMIN — IPRATROPIUM BROMIDE AND ALBUTEROL SULFATE 1 AMPULE: .5; 3 SOLUTION RESPIRATORY (INHALATION) at 20:17

## 2018-02-28 RX ADMIN — Medication 28 UNITS: at 20:51

## 2018-02-28 RX ADMIN — INSULIN LISPRO 6 UNITS: 100 INJECTION, SOLUTION INTRAVENOUS; SUBCUTANEOUS at 20:51

## 2018-02-28 RX ADMIN — METOPROLOL TARTRATE 25 MG: 25 TABLET, FILM COATED ORAL at 08:27

## 2018-02-28 RX ADMIN — INSULIN LISPRO 6 UNITS: 100 INJECTION, SOLUTION INTRAVENOUS; SUBCUTANEOUS at 13:09

## 2018-02-28 RX ADMIN — Medication 10 ML: at 20:48

## 2018-02-28 RX ADMIN — FINASTERIDE 5 MG: 5 TABLET, FILM COATED ORAL at 08:27

## 2018-02-28 RX ADMIN — IPRATROPIUM BROMIDE AND ALBUTEROL SULFATE 1 AMPULE: .5; 3 SOLUTION RESPIRATORY (INHALATION) at 08:09

## 2018-02-28 RX ADMIN — ALBUTEROL SULFATE 2.5 MG: 2.5 SOLUTION RESPIRATORY (INHALATION) at 15:05

## 2018-02-28 RX ADMIN — IPRATROPIUM BROMIDE AND ALBUTEROL SULFATE 1 AMPULE: .5; 3 SOLUTION RESPIRATORY (INHALATION) at 04:22

## 2018-02-28 RX ADMIN — TAMSULOSIN HYDROCHLORIDE 0.4 MG: 0.4 CAPSULE ORAL at 08:27

## 2018-02-28 RX ADMIN — IPRATROPIUM BROMIDE AND ALBUTEROL SULFATE 1 AMPULE: .5; 3 SOLUTION RESPIRATORY (INHALATION) at 15:09

## 2018-02-28 RX ADMIN — FAMOTIDINE 20 MG: 20 TABLET, FILM COATED ORAL at 20:48

## 2018-02-28 RX ADMIN — IPRATROPIUM BROMIDE AND ALBUTEROL SULFATE 1 AMPULE: .5; 3 SOLUTION RESPIRATORY (INHALATION) at 11:02

## 2018-02-28 RX ADMIN — WATER 2 G: 1 INJECTION INTRAMUSCULAR; INTRAVENOUS; SUBCUTANEOUS at 17:46

## 2018-02-28 RX ADMIN — Medication 10 ML: at 08:27

## 2018-02-28 RX ADMIN — PREDNISONE 20 MG: 20 TABLET ORAL at 08:27

## 2018-02-28 RX ADMIN — LISINOPRIL 20 MG: 20 TABLET ORAL at 08:26

## 2018-02-28 RX ADMIN — SIMVASTATIN 20 MG: 20 TABLET, FILM COATED ORAL at 20:48

## 2018-02-28 RX ADMIN — METOPROLOL TARTRATE 25 MG: 25 TABLET, FILM COATED ORAL at 20:48

## 2018-02-28 RX ADMIN — IPRATROPIUM BROMIDE AND ALBUTEROL SULFATE 1 AMPULE: .5; 3 SOLUTION RESPIRATORY (INHALATION) at 00:32

## 2018-02-28 ASSESSMENT — ENCOUNTER SYMPTOMS
ABDOMINAL PAIN: 0
BLURRED VISION: 0
SHORTNESS OF BREATH: 0
DOUBLE VISION: 0
VOMITING: 0
NAUSEA: 0
BACK PAIN: 1

## 2018-02-28 ASSESSMENT — PAIN SCALES - GENERAL
PAINLEVEL_OUTOF10: 0

## 2018-02-28 NOTE — PROGRESS NOTES
Patient requesting a Requip to be pushed back to 10:30 p.m. this evening, as well as something for sleep. This will be relayed to the patient's oncoming nurse.

## 2018-02-28 NOTE — PROGRESS NOTES
[facility]    Progress Note    2/28/2018    8:58 AM    Name:   Ike Lopez  MRN:     [id number]     Acct:      [de-identified]   Room:   2117/2117-01   Day:  7  Admit Date:  2/21/2018  2:48 PM    PCP:   Samson James DO  Code Status:  Full Code    Subjective:     C/C:   Chief Complaint   Patient presents with    Shortness of Breath     Interval History Status: improved. Patient seen and assessed at bedside. No acute overnight events. No respiratory complaints on NC this morning. Complains of some lumbar back pain; on assessment continues to have no tenderness to thoracolumbar spine palpation, minimal paraspinal muscle tenderness around lumbar spine. Continues to have bruising on left and right upper extremities. Venous doppler RUE negative. Continue IV Rocephin. Monitor today, discharge planning, will follow ID and pulm recs. Brief History:     The patient is a 76 y.o. Non-/non  male who presents with Shortness of Breath and he is admitted to the hospital for the management of SOB and lightheadedness. Hx COPD, DM II, HTN, HLD; hx nephrolithiasis and bladder cysts with chronic UTIs per family. Per patient felt lightheaded while using the bathroom, dizziness without syncope, fell from toilet today sdfand was unable to get up. No LOC. Low fever and chills for past 1-2 days. Rhinorrhea and congestion. Nausea without emesis today. Chronic SOB, on 2L home O2 daytime with exercise and nighttime, but patient only using at nighttime. Baseline sputum production, usually unable to bring up. No chest pain. No abd pain. No flank pain. Per patient no change in fluid intake or urination, but per family patient not keeping hydrated. No diarrhea/constipation. Review of Systems:     Review of Systems   Constitutional: Negative for chills and fever. Eyes: Negative for blurred vision and double vision.    Respiratory: Negative (gastroesophageal reflux disease); Hard of hearing; History of kidney stones; History of nasal obstruction; History of smoking; Hoarseness; Hyperlipidemia; Hypertension; Hypertrophy of nasal turbinates; Irritable bowel; MDRO (multiple drug resistant organisms) resistance; Mild obstructive sleep apnea; On supplemental oxygen therapy; Osteoarthritis; Paralysis of vocal cords; Restless legs syndrome; SOB (shortness of breath); Type II or unspecified type diabetes mellitus without mention of complication, not stated as uncontrolled; and Wears glasses. Social History:   reports that he quit smoking about 2 years ago. His smoking use included Cigarettes. He has a 100.00 pack-year smoking history. He has never used smokeless tobacco. He reports that he does not drink alcohol or use drugs. Family History:   Family History   Problem Relation Age of Onset    Diabetes Mother     Other Other      Testicular rhabomyosarcoma        Vitals:  BP (!) 140/74   Pulse 76   Temp 97.5 °F (36.4 °C) (Oral)   Resp 16   Ht 5' 7\" (1.702 m)   Wt 203 lb 4.2 oz (92.2 kg)   SpO2 100%   BMI 31.84 kg/m²    Temp (24hrs), Av.7 °F (36.5 °C), Min:97.3 °F (36.3 °C), Max:98 °F (36.7 °C)    Recent Labs      18   1447  18   2021  18   0214  18   0738   POCGLU  200*  174*  93  122*       I/O (24Hr): Intake/Output Summary (Last 24 hours) at 18 0858  Last data filed at 18 7520   Gross per 24 hour   Intake                0 ml   Output             2375 ml   Net            -2375 ml       Labs:    [unfilled]    Lab Results   Component Value Date/Time    SPECIAL  2018 09:32 AM     RIGHT HAND, 1mL ANAEROBIC, 5mL AEROBIC Performed at [address] [place] [address]  2018 09:32 AM      [address] [phone]     Lab Results   Component Value Date/Time    CULTURE NO GROWTH 2 DAYS 2018 09:32 AM    CULTURE  2018 09:32 AM     Performed at [facility] with associated findings, as above. Emphysema/ COPD. Nonacute appearing sphenoid sinus disease. Several small bony lucencies left T2 vertebral body and left pedicle at T3 likely incidental/ related to some degree of osteopenia ; correlate clinically and obtain follow-up imaging as indicated. Ct Chest Pulmonary Embolism W Contrast    Result Date: 2/21/2018  EXAMINATION: CTA OF THE CHEST 2/21/2018 5:23 pm TECHNIQUE: CTA of the chest was performed after the administration of intravenous contrast.  Multiplanar reformatted images are provided for review. MIP images are provided for review. Dose modulation, iterative reconstruction, and/or weight based adjustment of the mA/kV was utilized to reduce the radiation dose to as low as reasonably achievable. COMPARISON: 11/10/2015 HISTORY: ORDERING SYSTEM PROVIDED HISTORY: sob, elevated d-dimer. A 61-year-old male with shortness of breath and elevated D-dimer FINDINGS: Pulmonary Arteries: No obvious filling defect identified within the visualized pulmonary arterial vasculature that meets the criteria for pulmonary embolus. Evaluation of the bibasilar segmental and subsegmental pulmonary arteries is limited due to respiratory motion. Mediastinum: Visualized thyroid gland grossly unremarkable. No axillary, mediastinal, or hilar lymphadenopathy. Coronary artery disease. No pericardial or sizable pleural effusions. Diffuse atherosclerotic calcification and atheromatous plaque of the visualized aorta and branch vasculature. Mild cardiomegaly. Lungs/Pleura: Trachea and proximal central airways appear patent. Opacity within the right posterolateral trachea which may represent mucoid or aspirated material on image 29, series 7. Respiratory motion and dependent atelectasis within both lungs. Moderate emphysema. Upper Abdomen: Vascular stent graft is seen within the abdominal aorta. Partially visualized abdominal aortic aneurysm measuring 2.9 x 3.1 cm on image 148, series 5.

## 2018-02-28 NOTE — PROGRESS NOTES
Treatment Diagnosis difficulty walking   Prognosis Good   REQUIRES PT FOLLOW UP Yes   Discharge Recommendations Subacute/Skilled Nursing Facility   Activity Tolerance   Activity Tolerance Patient Tolerated treatment well   Plan   Times per week 6-7   Times per day Daily   Plan weeks 2 weeks   Specific instructions for Next Treatment ther exs and ther activities as tolerated   Safety Devices   Type of devices Left in bed;Nurse notified;Call light within reach  (Family members son and wife visiting.)   PT Whiteboard Notes   Therapy Whiteboard 2/27/18 Ther ex @ bedside, amb deferred pt just returned from toilet.  [id number] (30 mins)   Electronically signed by Yamini Woodruff PTA on 2/28/2018 at 9:28 AM

## 2018-02-28 NOTE — PROGRESS NOTES
removal; and cystoscopy w biopsy of bladder (N/A, 4/28/2017). Restrictions/Precautions  Restrictions/Precautions: Fall Risk;Contact Precautions    Subjective: Pt reports having difficulty today; pt states he isn't in pain; pt reports feeling weak compared to prior to admission  Comments: Pt is pleasant but easily perseverating on incident prior to writer arriving in room. Pt missed urinal while sitting EOB and urine was all over floor and pt. Pt encouraged that all is good and cleaned up. Pt repeats problem to each doctor arriving during Treatment. Vital Signs  Patient Currently in Pain: No        Oxygen Therapy  SpO2: 93 % (with amb during PT)  O2 Device: Nasal cannula  O2 Flow Rate (L/min): 3.5 L/min          Bed Mobility:   Bed Mobility  Rolling: Independent  Supine to Sit: Supervision  Sit to Supine: Supervision  Scooting: Supervision  Bed mobility  Scooting: Supervision    Transfers:  Sit to Stand: Contact guard assistance  Stand to sit: Contact guard assistance  Bed to Chair: Minimal assistance (standing with RW)  Stand Pivot Transfers: Contact guard assistance (Standing with RW)           Ambulation 1  Surface: level tile  Device: Rolling Walker  Assistance: Minimal assistance  Quality of Gait: short step length, guarded movements, good SHERRI, slightly forward flexed posture with head and gaze downward  Distance: 15ft  Comments: vc's for upright posture and scanning environment                                                              Posture: Good  Sitting - Static: Good (seated EOB)  Sitting - Dynamic: Good (Seated EOB)  Standing - Static: Fair;+ (Standing with RW)  Standing - Dynamic: Fair (Standing with RW)     Other exercises 1: Dangle EOB 15 min.   Other exercises 2: Seated bilat LE therex, x10  Other exercises 3: Sit to Stand x2           Activity Tolerance: Patient Tolerated treatment well;Patient limited by fatigue;Patient limited by endurance  PT Equipment Recommendations  Equipment Needed: No       Assessment  Activity Tolerance: Patient Tolerated treatment well;Patient limited by fatigue;Patient limited by endurance   Body structures, Functions, Activity limitations: Decreased functional mobility ; Decreased strength;Decreased balance  Specific instructions for Next Treatment: ther exs and ther activities as tolerated  Prognosis: Good  Discharge Recommendations: Subacute/Skilled Nursing Facility     Type of devices: Call light within reach; Left in chair;Nurse notified;Gait belt     Plan  Times per week: 6-7  Times per day: Daily  Current Treatment Recommendations: Strengthening, Balance Training, Functional Mobility Training, Transfer Training, Gait Training    Patient Education  New Education Provided: POC  Learner:patient  Method: demonstration and explanation       Outcome: needs reinforcement     Goals  Short term goals  Time Frame for Short term goals: 4 sessions  Short term goal 1: Improve strength in both LE to 4+/5  Short term goal 2: Independent in sit to stand  Short term goal 3: Independent in ambulation 40 feet with a rolling walker.   Short term goal 4: Improve standing balance to good  Long term goals  Time Frame for Long term goals : 8 sessions  Long term goal 1: Independent ambulation 120 feet with rolling walker    PT Individual Minutes  Time In: 7628  Time Out: 8068  Minutes: 29    Electronically signed by Deedee Dunn PTA on 2/28/18 at 4:59 PM

## 2018-02-28 NOTE — PLAN OF CARE
Problem: Falls - Risk of  Goal: Absence of falls  Outcome: Ongoing  Pt. Free of falls and injuries this shift. Problem: Pain:  Goal: Control of acute pain  Control of acute pain   Outcome: Ongoing  Adequate pain control achieved this shift. See MAR.

## 2018-02-28 NOTE — PROGRESS NOTES
MDRO (multiple drug resistant organisms) resistance 12/2015    MRSA?  Mild obstructive sleep apnea     On supplemental oxygen therapy     2 liters per minute at night, patient does not use during the day, but supposed to use at 2 liters per minute.  Osteoarthritis     Paralysis of vocal cords     Paralyzed left vocal cord, idiopathic    Restless legs syndrome     controlled with Requip    SOB (shortness of breath)     Type II or unspecified type diabetes mellitus without mention of complication, not stated as uncontrolled     controlled on oral agents    Wears glasses     for reading      Past Surgical History:   Procedure Laterality Date    ABDOMINAL AORTIC ANEURYSM REPAIR  06/26/2013    BACK SURGERY      tumor    BLADDER SURGERY      bx    CATARACT REMOVAL Right     CHOLECYSTECTOMY      CYST REMOVAL      buttocks, benign    CYSTOSCOPY W BIOPSY OF BLADDER N/A 4/28/2017    CYSTOSCOPY BLADDER BIOPSY FULGERATION performed by Curly Cosby MD at [address] [place] [address] Left 12/2015    HERNIA REPAIR      Inguinal herniorrhaphy    KIDNEY STONE SURGERY      extraction of kidney stones    LARYNGOSCOPY      NOSE SURGERY      SKIN BIOPSY  2008    back---bcc    SKIN CANCER EXCISION Right 10/18/2016    basal cell rt. cheek    SMALL INTESTINE SURGERY      \"a long time ago, i had 16 inches removed\"          Admission Meds  No current facility-administered medications on file prior to encounter.       Current Outpatient Prescriptions on File Prior to Encounter   Medication Sig Dispense Refill    finasteride (PROSCAR) 5 MG tablet TAKE 1 TABLET BY MOUTH DAILY 90 tablet 3    glipiZIDE (GLUCOTROL) 10 MG tablet Take 1 tablet by mouth 2 times daily (before meals) 180 tablet 3    rOPINIRole (REQUIP) 2 MG tablet TAKE ONE TABLET BY MOUTH EVERY EVENING AS DIRECTED 90 tablet 1    insulin NPH (HUMULIN N KWIKPEN) 100 UNIT/ML injection pen Inject 30 Units into the skin 2 times daily (before meals) 5 Pen 5    lovastatin (MEVACOR) 40 MG tablet TAKE 1 TABLET BY MOUTH NIGHTLY 90 tablet 1    citalopram (CELEXA) 20 MG tablet TAKE 1 TABLET BY MOUTH EVERY MORNING 90 tablet 1    PROAIR  (90 BASE) MCG/ACT inhaler INHALE 2 PUFFS USING INHALER EVERY 4 HOURS AS NEEDED 6 Inhaler 3    fluticasone-salmeterol (ADVAIR) 250-50 MCG/DOSE AEPB Inhale 1 puff into the lungs every 12 hours. (Patient taking differently: Inhale 1 puff into the lungs every 12 hours as needed ) 60 each 5    quinapril (ACCUPRIL) 40 MG tablet Take 1 tablet by mouth daily 90 tablet 3    B-D UF III MINI PEN NEEDLES 31G X 5 MM MISC USE WITH INSULIN 4 TIMES DAILY 100 each 2           Allergies  Allergies   Allergen Reactions    Ativan [Lorazepam]      hallucinations    Codeine      Cough syrup gi upset        Social   Social History   Substance Use Topics    Smoking status: Former Smoker     Packs/day: 2.00     Years: 50.00     Types: Cigarettes     Quit date: 12/14/2015    Smokeless tobacco: Never Used    Alcohol use No      Comment: rarely             Family History   Problem Relation Age of Onset    Diabetes Mother     Other Other      Testicular rhabomyosarcoma           Review of Systems        Tolerating antibiotics.          Physical Exam  BP (!) 146/87   Pulse 74   Temp 98 °F (36.7 °C) (Oral)   Resp 20   Ht 5' 7\" (1.702 m)   Wt 203 lb 4.2 oz (92.2 kg)   SpO2 97%   BMI 31.84 kg/m²           General Appearance: AO3 ,NAD  Skin: warm and dry, no rash,upper back small stage 2 ulcer with mild surrounding erythema ,no drainage     Neck: neck supple    Pulmonary/Chest: coarse  to auscultation bilaterally-  Cardiovascular: normal rate, regular rhythm, normal S1 and S2, no murmurs  Abdomen: soft, non-distended, normal bowel sounds  Extremities: no cyanosis, clubbing or edema  Musculoskeletal:  no joint swelling  Peripheral lines and Castaneda in place     Data Review:    Recent Labs      02/26/18   0103

## 2018-03-01 ENCOUNTER — APPOINTMENT (OUTPATIENT)
Dept: GENERAL RADIOLOGY | Age: 78
DRG: 871 | End: 2018-03-01
Payer: MEDICARE

## 2018-03-01 ENCOUNTER — APPOINTMENT (OUTPATIENT)
Dept: INTERVENTIONAL RADIOLOGY/VASCULAR | Age: 78
DRG: 871 | End: 2018-03-01
Payer: MEDICARE

## 2018-03-01 LAB
ABSOLUTE EOS #: 0.2 K/UL (ref 0–0.4)
ABSOLUTE IMMATURE GRANULOCYTE: ABNORMAL K/UL (ref 0–0.3)
ABSOLUTE LYMPH #: 1.4 K/UL (ref 1–4.8)
ABSOLUTE MONO #: 0.7 K/UL (ref 0.1–1.3)
BASOPHILS # BLD: 1 % (ref 0–2)
BASOPHILS ABSOLUTE: 0.1 K/UL (ref 0–0.2)
DIFFERENTIAL TYPE: ABNORMAL
EOSINOPHILS RELATIVE PERCENT: 2 % (ref 0–4)
GLUCOSE BLD-MCNC: 131 MG/DL (ref 75–110)
GLUCOSE BLD-MCNC: 167 MG/DL (ref 75–110)
GLUCOSE BLD-MCNC: 185 MG/DL (ref 75–110)
GLUCOSE BLD-MCNC: 223 MG/DL (ref 75–110)
GLUCOSE BLD-MCNC: 285 MG/DL (ref 75–110)
HCT VFR BLD CALC: 37.8 % (ref 41–53)
HEMOGLOBIN: 13.2 G/DL (ref 13.5–17.5)
IMMATURE GRANULOCYTES: ABNORMAL %
LYMPHOCYTES # BLD: 14 % (ref 24–44)
MCH RBC QN AUTO: 33.7 PG (ref 26–34)
MCHC RBC AUTO-ENTMCNC: 34.9 G/DL (ref 31–37)
MCV RBC AUTO: 96.4 FL (ref 80–100)
MONOCYTES # BLD: 7 % (ref 1–7)
NRBC AUTOMATED: ABNORMAL PER 100 WBC
PDW BLD-RTO: 13.1 % (ref 11.5–14.9)
PLATELET # BLD: 110 K/UL (ref 150–450)
PLATELET ESTIMATE: ABNORMAL
PMV BLD AUTO: 8.7 FL (ref 6–12)
RBC # BLD: 3.92 M/UL (ref 4.5–5.9)
RBC # BLD: ABNORMAL 10*6/UL
SEG NEUTROPHILS: 76 % (ref 36–66)
SEGMENTED NEUTROPHILS ABSOLUTE COUNT: 7.3 K/UL (ref 1.3–9.1)
WBC # BLD: 9.6 K/UL (ref 3.5–11)
WBC # BLD: ABNORMAL 10*3/UL

## 2018-03-01 PROCEDURE — 6370000000 HC RX 637 (ALT 250 FOR IP): Performed by: INTERNAL MEDICINE

## 2018-03-01 PROCEDURE — 6370000000 HC RX 637 (ALT 250 FOR IP): Performed by: STUDENT IN AN ORGANIZED HEALTH CARE EDUCATION/TRAINING PROGRAM

## 2018-03-01 PROCEDURE — 99233 SBSQ HOSP IP/OBS HIGH 50: CPT | Performed by: INTERNAL MEDICINE

## 2018-03-01 PROCEDURE — 36415 COLL VENOUS BLD VENIPUNCTURE: CPT

## 2018-03-01 PROCEDURE — 6370000000 HC RX 637 (ALT 250 FOR IP): Performed by: RADIOLOGY

## 2018-03-01 PROCEDURE — C1751 CATH, INF, PER/CENT/MIDLINE: HCPCS

## 2018-03-01 PROCEDURE — 94640 AIRWAY INHALATION TREATMENT: CPT

## 2018-03-01 PROCEDURE — 2580000003 HC RX 258: Performed by: STUDENT IN AN ORGANIZED HEALTH CARE EDUCATION/TRAINING PROGRAM

## 2018-03-01 PROCEDURE — 2060000000 HC ICU INTERMEDIATE R&B

## 2018-03-01 PROCEDURE — 2500000003 HC RX 250 WO HCPCS: Performed by: RADIOLOGY

## 2018-03-01 PROCEDURE — 97110 THERAPEUTIC EXERCISES: CPT

## 2018-03-01 PROCEDURE — 36569 INSJ PICC 5 YR+ W/O IMAGING: CPT | Performed by: RADIOLOGY

## 2018-03-01 PROCEDURE — 99232 SBSQ HOSP IP/OBS MODERATE 35: CPT | Performed by: INTERNAL MEDICINE

## 2018-03-01 PROCEDURE — 94761 N-INVAS EAR/PLS OXIMETRY MLT: CPT

## 2018-03-01 PROCEDURE — 97530 THERAPEUTIC ACTIVITIES: CPT

## 2018-03-01 PROCEDURE — 85025 COMPLETE CBC W/AUTO DIFF WBC: CPT

## 2018-03-01 PROCEDURE — 82947 ASSAY GLUCOSE BLOOD QUANT: CPT

## 2018-03-01 PROCEDURE — 76937 US GUIDE VASCULAR ACCESS: CPT | Performed by: RADIOLOGY

## 2018-03-01 PROCEDURE — 6360000002 HC RX W HCPCS: Performed by: INTERNAL MEDICINE

## 2018-03-01 PROCEDURE — 6370000000 HC RX 637 (ALT 250 FOR IP): Performed by: NURSE PRACTITIONER

## 2018-03-01 PROCEDURE — 2580000003 HC RX 258: Performed by: INTERNAL MEDICINE

## 2018-03-01 PROCEDURE — 71046 X-RAY EXAM CHEST 2 VIEWS: CPT

## 2018-03-01 RX ORDER — LIDOCAINE HYDROCHLORIDE 10 MG/ML
INJECTION, SOLUTION INFILTRATION; PERINEURAL
Status: COMPLETED | OUTPATIENT
Start: 2018-03-01 | End: 2018-03-01

## 2018-03-01 RX ORDER — LISINOPRIL 10 MG/1
10 TABLET ORAL DAILY
Status: DISCONTINUED | OUTPATIENT
Start: 2018-03-02 | End: 2018-03-09

## 2018-03-01 RX ORDER — PREDNISONE 10 MG/1
10 TABLET ORAL DAILY
Status: DISPENSED | OUTPATIENT
Start: 2018-03-02 | End: 2018-03-06

## 2018-03-01 RX ADMIN — Medication 28 UNITS: at 21:01

## 2018-03-01 RX ADMIN — ROPINIROLE HYDROCHLORIDE 2 MG: 2 TABLET, FILM COATED ORAL at 01:00

## 2018-03-01 RX ADMIN — FINASTERIDE 5 MG: 5 TABLET, FILM COATED ORAL at 08:37

## 2018-03-01 RX ADMIN — IPRATROPIUM BROMIDE AND ALBUTEROL SULFATE 1 AMPULE: .5; 3 SOLUTION RESPIRATORY (INHALATION) at 08:15

## 2018-03-01 RX ADMIN — Medication 10 ML: at 08:38

## 2018-03-01 RX ADMIN — ROPINIROLE HYDROCHLORIDE 2 MG: 2 TABLET, FILM COATED ORAL at 21:01

## 2018-03-01 RX ADMIN — FAMOTIDINE 20 MG: 20 TABLET, FILM COATED ORAL at 21:00

## 2018-03-01 RX ADMIN — TAMSULOSIN HYDROCHLORIDE 0.4 MG: 0.4 CAPSULE ORAL at 08:37

## 2018-03-01 RX ADMIN — LISINOPRIL 20 MG: 20 TABLET ORAL at 08:37

## 2018-03-01 RX ADMIN — METOPROLOL TARTRATE 25 MG: 25 TABLET, FILM COATED ORAL at 08:37

## 2018-03-01 RX ADMIN — IPRATROPIUM BROMIDE AND ALBUTEROL SULFATE 1 AMPULE: .5; 3 SOLUTION RESPIRATORY (INHALATION) at 14:58

## 2018-03-01 RX ADMIN — INSULIN LISPRO 9 UNITS: 100 INJECTION, SOLUTION INTRAVENOUS; SUBCUTANEOUS at 21:01

## 2018-03-01 RX ADMIN — WATER 2 G: 1 INJECTION INTRAMUSCULAR; INTRAVENOUS; SUBCUTANEOUS at 17:39

## 2018-03-01 RX ADMIN — FAMOTIDINE 20 MG: 20 TABLET, FILM COATED ORAL at 08:37

## 2018-03-01 RX ADMIN — Medication 10 ML: at 21:10

## 2018-03-01 RX ADMIN — SIMVASTATIN 20 MG: 20 TABLET, FILM COATED ORAL at 21:00

## 2018-03-01 RX ADMIN — INSULIN LISPRO 3 UNITS: 100 INJECTION, SOLUTION INTRAVENOUS; SUBCUTANEOUS at 12:13

## 2018-03-01 RX ADMIN — IPRATROPIUM BROMIDE AND ALBUTEROL SULFATE 1 AMPULE: .5; 3 SOLUTION RESPIRATORY (INHALATION) at 19:04

## 2018-03-01 RX ADMIN — IPRATROPIUM BROMIDE AND ALBUTEROL SULFATE 1 AMPULE: .5; 3 SOLUTION RESPIRATORY (INHALATION) at 11:37

## 2018-03-01 RX ADMIN — INSULIN LISPRO 6 UNITS: 100 INJECTION, SOLUTION INTRAVENOUS; SUBCUTANEOUS at 17:40

## 2018-03-01 RX ADMIN — ACETAMINOPHEN 650 MG: 325 TABLET, FILM COATED ORAL at 07:03

## 2018-03-01 RX ADMIN — PREDNISONE 20 MG: 20 TABLET ORAL at 08:37

## 2018-03-01 RX ADMIN — LIDOCAINE HYDROCHLORIDE 1 ML: 10 INJECTION, SOLUTION INFILTRATION; PERINEURAL at 14:21

## 2018-03-01 RX ADMIN — METOPROLOL TARTRATE 25 MG: 25 TABLET, FILM COATED ORAL at 21:01

## 2018-03-01 ASSESSMENT — PAIN SCALES - GENERAL
PAINLEVEL_OUTOF10: 0
PAINLEVEL_OUTOF10: 8

## 2018-03-01 ASSESSMENT — ENCOUNTER SYMPTOMS
NAUSEA: 0
VOMITING: 0
SHORTNESS OF BREATH: 0
ABDOMINAL PAIN: 0
BLURRED VISION: 0
BACK PAIN: 1
DOUBLE VISION: 0

## 2018-03-01 NOTE — PROGRESS NOTES
No bruising or bleeding  Extremities - No clubbing, cyanosis, edema    MEDS      [START ON 3/2/2018] lisinopril  10 mg Oral Daily    ipratropium-albuterol  1 ampule Inhalation 4x daily    rOPINIRole  2 mg Oral Nightly    metoprolol tartrate  25 mg Oral BID    famotidine  20 mg Oral BID    predniSONE  20 mg Oral Daily    insulin lispro  0-18 Units Subcutaneous Q6H    insulin glargine  28 Units Subcutaneous Nightly    cefTRIAXone (ROCEPHIN) IV  2 g Intravenous Q24H    tamsulosin  0.4 mg Oral Daily    sodium chloride flush  10 mL Intravenous 2 times per day    enoxaparin  40 mg Subcutaneous Daily    finasteride  5 mg Oral Daily    simvastatin  20 mg Oral Nightly      dexmedetomidine (PRECEDEX) IV infusion Stopped (02/26/18 1835)    dextrose       hydrALAZINE, ondansetron, albuterol sulfate HFA, sodium chloride flush, acetaminophen, sodium phosphate IVPB **OR** sodium phosphate IVPB, potassium chloride **OR** potassium chloride **OR** potassium chloride, magnesium sulfate, glucose, dextrose, glucagon (rDNA), dextrose, albuterol    LABS   CBC   Recent Labs      03/01/18   0535   WBC  9.6   HGB  13.2*   HCT  37.8*   MCV  96.4   PLT  110*     BMP:   Lab Results   Component Value Date     02/27/2018    K 3.6 02/27/2018     02/27/2018    CO2 31 02/27/2018    BUN 21 02/27/2018    LABALBU 3.6 02/21/2018    CREATININE 0.76 02/27/2018    CALCIUM 7.8 02/27/2018    GFRAA >60 02/27/2018    LABGLOM >60 02/27/2018     ABGs:  Lab Results   Component Value Date    PHART 7.438 02/26/2018    PO2ART 72.5 02/26/2018    WXJ9KJZ 43.3 02/26/2018      Lab Results   Component Value Date    MODE CPAP 02/26/2018     Ionized Calcium:  No results found for: IONCA  Magnesium:    Lab Results   Component Value Date    MG 2.5 02/26/2018      Phosphorus:    Lab Results   Component Value Date    PHOS 2.1 02/26/2018        LIVER PROFILE No results for input(s): AST, ALT, LIPASE, BILIDIR, BILITOT, ALKPHOS in the last 72

## 2018-03-01 NOTE — PROGRESS NOTES
[address] [facility], [address] [facility] [phone]       [facility]    Radiology:    Xr Chest Standard (2 Vw)    Result Date: 2/21/2018  EXAMINATION: TWO VIEWS OF THE CHEST 2/21/2018 4:08 pm COMPARISON: Two-view chest from 03/24/2017 HISTORY: ORDERING SYSTEM PROVIDED HISTORY: fall TECHNOLOGIST PROVIDED HISTORY: Reason for exam:->fall Ordering Physician Provided Reason for Exam: fall Acuity: Acute Type of Exam: Initial Mechanism of Injury: Pt states that he got dizzy and fell off toilet today. Left hip discomfort Relevant Medical/Surgical History: Pt states that he got dizzy and fell off toilet today. Left hip discomfort Additional history of hypertension, bladder cancer, gastroesophageal reflux disease, asthma, tobacco abuse, chronic obstructive pulmonary disease and respiratory failure, and multiple drug resistant organisms. FINDINGS: Overlying ECG monitor leads. Mildly enlarged but stable appearing cardiac silhouette. Uncoiled and calcified thoracic aorta, similar by comparison. Mild basilar atelectasis or scarring, best seen right lateral base. No consolidation or sizable pleural effusion. No pneumothorax. No obvious rib fracture. Some loss mid thoracic vertebral body height of indeterminate age. Mild kyphoscoliosis thoracic spine with some osteopenia and DJD. Clips status post cholecystectomy. No acute cardiopulmonary disease. Bibasilar atelectasis or scarring. Some loss mid thoracic vertebral body height, possibly chronic. Correlate clinically. Xr Pelvis (1-2 Views)    Result Date: 2/21/2018  EXAMINATION: SINGLE VIEW OF THE PELVIS 2/21/2018 4:09 pm COMPARISON: CT angiography of the abdomen and pelvis dated 07/19/2011 HISTORY: ORDERING SYSTEM PROVIDED HISTORY: fall TECHNOLOGIST PROVIDED HISTORY: Reason for exam:->fall Ordering Physician Provided Reason for Exam: fall Acuity: Acute Type of Exam: Initial Mechanism of Injury: Pt states that he got dizzy and fell off toilet today.  Left hip with associated findings, as above. Emphysema/ COPD. Nonacute appearing sphenoid sinus disease. Several small bony lucencies left T2 vertebral body and left pedicle at T3 likely incidental/ related to some degree of osteopenia ; correlate clinically and obtain follow-up imaging as indicated. Ct Chest Pulmonary Embolism W Contrast    Result Date: 2/21/2018  EXAMINATION: CTA OF THE CHEST 2/21/2018 5:23 pm TECHNIQUE: CTA of the chest was performed after the administration of intravenous contrast.  Multiplanar reformatted images are provided for review. MIP images are provided for review. Dose modulation, iterative reconstruction, and/or weight based adjustment of the mA/kV was utilized to reduce the radiation dose to as low as reasonably achievable. COMPARISON: 11/10/2015 HISTORY: ORDERING SYSTEM PROVIDED HISTORY: sob, elevated d-dimer. A 77-year-old male with shortness of breath and elevated D-dimer FINDINGS: Pulmonary Arteries: No obvious filling defect identified within the visualized pulmonary arterial vasculature that meets the criteria for pulmonary embolus. Evaluation of the bibasilar segmental and subsegmental pulmonary arteries is limited due to respiratory motion. Mediastinum: Visualized thyroid gland grossly unremarkable. No axillary, mediastinal, or hilar lymphadenopathy. Coronary artery disease. No pericardial or sizable pleural effusions. Diffuse atherosclerotic calcification and atheromatous plaque of the visualized aorta and branch vasculature. Mild cardiomegaly. Lungs/Pleura: Trachea and proximal central airways appear patent. Opacity within the right posterolateral trachea which may represent mucoid or aspirated material on image 29, series 7. Respiratory motion and dependent atelectasis within both lungs. Moderate emphysema. Upper Abdomen: Vascular stent graft is seen within the abdominal aorta. Partially visualized abdominal aortic aneurysm measuring 2.9 x 3.1 cm on image 148, series 5. Diffuse fatty infiltration of the liver. Prior cholecystectomy. Mild adreniform enlargement of the bilateral adrenal glands can be seen with adrenal hyperplasia. Mild bilateral perinephric stranding. Fluid density lesions within both kidneys most likely representing renal cysts, the largest which is partially visualized at the midpole of the right kidney measuring up to 3.3 cm on image 144, series 5. Possible punctate 1 mm nonobstructing calculus on image 148, series 5. 1.3 cm fat containing right adrenal mass on image 111, series 5, consistent with a right adrenal myelolipoma. Soft Tissues/Bones: Mild moderate diffuse degenerative changes throughout the spine. Moderate thoracic dextroscoliosis. 1. No clear evidence for pulmonary embolus within the limitations of this study. 2. Opacity in the right posterolateral trachea which may represent mucoid secretion or aspirated material. 3. Respiratory motion and dependent atelectasis within both lungs. Moderate emphysema. 4. Partially visualized AAA measuring 2.9 x 3.1 cm. Follow-up guidelines provided below. 5. Fatty liver. Prior cholecystectomy. 6. Adrenal hyperplasia. Right adrenal myelolipoma measuring up to 1.3 cm. 7. Multiple probable bilateral renal cysts, some which are partially visualized. 8. Coronary artery disease. Atherosclerotic calcification and atheromatous plaque of the aorta. RECOMMENDATIONS: Managing Abdominal Aortic Aneurysms 2.6-2.9 cm: Every 5 years* 3.0-3.4 cm: Every 3 years. 3.5-3.9 cm: Every 1 year. 4.0-4.4 cm: Every 1 year. Recommend vascular consultation. 4.5-5.4 cm: Every 6 months. Recommend vascular consultation. Greater than or equal to 5.5 cm: Referral to vascular surgeon. *For abdominal aortas with maximum diameter of 2.6-2.9 cm meeting criteria for AAA (>50% of proximal normal segment). Reference: J Vasc Surg.  2009 Oct;50(4 Suppl):S2-49         Physical Examination:        Physical Exam   Constitutional: He is oriented to Proscar. Soni Noel MD  3/1/2018  1:53 PM   Attending Physician Statement  Patient seen and examined  I have discussed the care of the patient, including pertinent history and exam findings,  with the resident. I have reviewed the key elements of all parts of the encounter with the resident. I agree with the assessment, plan and orders as documented by the resident.     Tomi Kolb

## 2018-03-01 NOTE — PROGRESS NOTES
Patient calls appropriately for assistance to restroom. Assisted with walker, patients gait steady. Once at toilet, patient turned to sit down. While facing writer begins to fall to the right. Writer grabbed patient, but was unsuccessful in stopping the fall, however was able to ease the patient  And slow him down as he fell to the floor. Patient denies dizziness, unsure what happened. Assisted to patient to his feet. States he does not feel weird. Patient did not hit his head, fell into side of toilet. No redness or bruises noted, will continue to monitor. Vitals completed.  Notified house supervisor, Notified [place] Kenroy NP, Notified wife

## 2018-03-01 NOTE — PROGRESS NOTES
Infectious disease Consult Note      Patient: Genie Whiting  : 1940  Acct#:  [id number]     Date:  3/1/2018    Subjective:       History of Present Illness  Patient is a [de-identified] y.o.  male admitted with Sepsis ([facility] [address] [A41.9] who is seen in consult for the same . The patient was brought to the ER after a fall at the bathroom with no loss consciousness . He had body ache ,fever and chills ,chronic cough and SOB, not feeling well for the last 2 days  . He lives at home with his wife with no recent travel or sick contact . Blood cultures on admission grew MSSA . Lactic acid was elevated ,BP stable ,CT chest showed  Opacity in the right posterolateral trachea ,no PE. Echocardiogram showed no vegetation . He had H/O MRSA left elbow infection several years ago was treated at [facility] .  H/O DM   H/o Bladder CA ,UA unremarkable     Today   He is sitting up, on O2 through [place], maintaining O2 sat. No cough, CP or SOB at this time   blood cultures  positive   Blood cultures  negative to date   LUIS no reported vegetations .   Past Medical History:   Diagnosis Date    Abdominal aortic aneurysm (AAA) ([facility] [address]     small, post endovascular repair    Alveolar hypoventilation     on oxygen    Asthma     BCC (basal cell carcinoma of skin)     Bladder cancer (HCC)     BPH (benign prostatic hyperplasia)     Cataracts, bilateral     hx of    Chronic constipation     Chronic cor pulmonale (HCC)     on nocturnal oxygen    Chronic hypoxemic respiratory failure (HCC)     COPD (chronic obstructive pulmonary disease) (HCC)     stage II    Depression     Diverticulitis     GERD (gastroesophageal reflux disease)     Hard of hearing     both ears    History of kidney stones     passed on own years ago    History of nasal obstruction     History of smoking     Hoarseness     multifactorial    Hyperlipidemia     Hypertension     Hypertrophy of nasal turbinates     Irritable bowel skin 2 times daily (before meals) 5 Pen 5    lovastatin (MEVACOR) 40 MG tablet TAKE 1 TABLET BY MOUTH NIGHTLY 90 tablet 1    citalopram (CELEXA) 20 MG tablet TAKE 1 TABLET BY MOUTH EVERY MORNING 90 tablet 1    PROAIR  (90 BASE) MCG/ACT inhaler INHALE 2 PUFFS USING INHALER EVERY 4 HOURS AS NEEDED 6 Inhaler 3    fluticasone-salmeterol (ADVAIR) 250-50 MCG/DOSE AEPB Inhale 1 puff into the lungs every 12 hours. (Patient taking differently: Inhale 1 puff into the lungs every 12 hours as needed ) 60 each 5    quinapril (ACCUPRIL) 40 MG tablet Take 1 tablet by mouth daily 90 tablet 3    B-D UF III MINI PEN NEEDLES 31G X 5 MM MISC USE WITH INSULIN 4 TIMES DAILY 100 each 2           Allergies  Allergies   Allergen Reactions    Ativan [Lorazepam]      hallucinations    Codeine      Cough syrup gi upset        Social   Social History   Substance Use Topics    Smoking status: Former Smoker     Packs/day: 2.00     Years: 50.00     Types: Cigarettes     Quit date: 12/14/2015    Smokeless tobacco: Never Used    Alcohol use No      Comment: rarely             Family History   Problem Relation Age of Onset    Diabetes Mother     Other Other      Testicular rhabomyosarcoma           Review of Systems        Tolerating antibiotics.          Physical Exam  BP (!) 93/50   Pulse 64   Temp 97.9 °F (36.6 °C) (Oral)   Resp 20   Ht 5' 7\" (1.702 m)   Wt 200 lb 2.8 oz (90.8 kg)   SpO2 94%   BMI 31.35 kg/m²           General Appearance: AO3 ,NAD  Skin: warm and dry, no rash,upper back small stage 2 ulcer with mild surrounding erythema ,no drainage     Neck: neck supple    Pulmonary/Chest: Decreased aeration bilaterally-  Cardiovascular: normal rate, regular rhythm, normal S1 and S2, no murmurs  Abdomen: soft, non-distended, normal bowel sounds  Extremities: no cyanosis, clubbing or edema  Musculoskeletal:  no joint swelling      Data Review:    Recent Labs      02/27/18   0427  02/28/18   0433  03/01/18   0535   WBC

## 2018-03-02 LAB
ABSOLUTE EOS #: 0.1 K/UL (ref 0–0.4)
ABSOLUTE IMMATURE GRANULOCYTE: ABNORMAL K/UL (ref 0–0.3)
ABSOLUTE LYMPH #: 1.1 K/UL (ref 1–4.8)
ABSOLUTE MONO #: 0.5 K/UL (ref 0.1–1.3)
ANION GAP SERPL CALCULATED.3IONS-SCNC: 11 MMOL/L (ref 9–17)
BASOPHILS # BLD: 0 % (ref 0–2)
BASOPHILS ABSOLUTE: 0 K/UL (ref 0–0.2)
BUN BLDV-MCNC: 21 MG/DL (ref 8–23)
BUN/CREAT BLD: ABNORMAL (ref 9–20)
CALCIUM SERPL-MCNC: 8.9 MG/DL (ref 8.6–10.4)
CHLORIDE BLD-SCNC: 97 MMOL/L (ref 98–107)
CO2: 32 MMOL/L (ref 20–31)
CREAT SERPL-MCNC: 0.61 MG/DL (ref 0.7–1.2)
DIFFERENTIAL TYPE: ABNORMAL
EOSINOPHILS RELATIVE PERCENT: 1 % (ref 0–4)
ESTIMATED AVERAGE GLUCOSE: 212 MG/DL
GFR AFRICAN AMERICAN: >60 ML/MIN
GFR NON-AFRICAN AMERICAN: >60 ML/MIN
GFR SERPL CREATININE-BSD FRML MDRD: ABNORMAL ML/MIN/{1.73_M2}
GFR SERPL CREATININE-BSD FRML MDRD: ABNORMAL ML/MIN/{1.73_M2}
GLUCOSE BLD-MCNC: 160 MG/DL (ref 70–99)
GLUCOSE BLD-MCNC: 174 MG/DL (ref 75–110)
GLUCOSE BLD-MCNC: 232 MG/DL (ref 75–110)
GLUCOSE BLD-MCNC: 263 MG/DL (ref 75–110)
GLUCOSE BLD-MCNC: 281 MG/DL (ref 75–110)
HBA1C MFR BLD: 9 % (ref 4–6)
HCT VFR BLD CALC: 37.5 % (ref 41–53)
HEMOGLOBIN: 12.8 G/DL (ref 13.5–17.5)
IMMATURE GRANULOCYTES: ABNORMAL %
LYMPHOCYTES # BLD: 14 % (ref 24–44)
MCH RBC QN AUTO: 33.6 PG (ref 26–34)
MCHC RBC AUTO-ENTMCNC: 34.2 G/DL (ref 31–37)
MCV RBC AUTO: 98.2 FL (ref 80–100)
MONOCYTES # BLD: 7 % (ref 1–7)
NRBC AUTOMATED: ABNORMAL PER 100 WBC
PDW BLD-RTO: 12.8 % (ref 11.5–14.9)
PLATELET # BLD: 102 K/UL (ref 150–450)
PLATELET ESTIMATE: ABNORMAL
PMV BLD AUTO: 8.3 FL (ref 6–12)
POTASSIUM SERPL-SCNC: 4.1 MMOL/L (ref 3.7–5.3)
RBC # BLD: 3.81 M/UL (ref 4.5–5.9)
RBC # BLD: ABNORMAL 10*6/UL
SEG NEUTROPHILS: 78 % (ref 36–66)
SEGMENTED NEUTROPHILS ABSOLUTE COUNT: 5.9 K/UL (ref 1.3–9.1)
SODIUM BLD-SCNC: 140 MMOL/L (ref 135–144)
WBC # BLD: 7.6 K/UL (ref 3.5–11)
WBC # BLD: ABNORMAL 10*3/UL

## 2018-03-02 PROCEDURE — 6370000000 HC RX 637 (ALT 250 FOR IP): Performed by: RADIOLOGY

## 2018-03-02 PROCEDURE — 6360000002 HC RX W HCPCS: Performed by: STUDENT IN AN ORGANIZED HEALTH CARE EDUCATION/TRAINING PROGRAM

## 2018-03-02 PROCEDURE — 99233 SBSQ HOSP IP/OBS HIGH 50: CPT | Performed by: INTERNAL MEDICINE

## 2018-03-02 PROCEDURE — 6370000000 HC RX 637 (ALT 250 FOR IP): Performed by: INTERNAL MEDICINE

## 2018-03-02 PROCEDURE — 97110 THERAPEUTIC EXERCISES: CPT

## 2018-03-02 PROCEDURE — 6370000000 HC RX 637 (ALT 250 FOR IP): Performed by: NURSE PRACTITIONER

## 2018-03-02 PROCEDURE — 85025 COMPLETE CBC W/AUTO DIFF WBC: CPT

## 2018-03-02 PROCEDURE — 99232 SBSQ HOSP IP/OBS MODERATE 35: CPT | Performed by: INTERNAL MEDICINE

## 2018-03-02 PROCEDURE — 97530 THERAPEUTIC ACTIVITIES: CPT

## 2018-03-02 PROCEDURE — 97116 GAIT TRAINING THERAPY: CPT

## 2018-03-02 PROCEDURE — 94640 AIRWAY INHALATION TREATMENT: CPT

## 2018-03-02 PROCEDURE — 94761 N-INVAS EAR/PLS OXIMETRY MLT: CPT

## 2018-03-02 PROCEDURE — 6370000000 HC RX 637 (ALT 250 FOR IP): Performed by: STUDENT IN AN ORGANIZED HEALTH CARE EDUCATION/TRAINING PROGRAM

## 2018-03-02 PROCEDURE — 80048 BASIC METABOLIC PNL TOTAL CA: CPT

## 2018-03-02 PROCEDURE — 83036 HEMOGLOBIN GLYCOSYLATED A1C: CPT

## 2018-03-02 PROCEDURE — 2580000003 HC RX 258: Performed by: INTERNAL MEDICINE

## 2018-03-02 PROCEDURE — 36415 COLL VENOUS BLD VENIPUNCTURE: CPT

## 2018-03-02 PROCEDURE — 82947 ASSAY GLUCOSE BLOOD QUANT: CPT

## 2018-03-02 PROCEDURE — 6360000002 HC RX W HCPCS: Performed by: INTERNAL MEDICINE

## 2018-03-02 PROCEDURE — 2060000000 HC ICU INTERMEDIATE R&B

## 2018-03-02 PROCEDURE — 2580000003 HC RX 258: Performed by: STUDENT IN AN ORGANIZED HEALTH CARE EDUCATION/TRAINING PROGRAM

## 2018-03-02 RX ORDER — INSULIN GLARGINE 100 [IU]/ML
32 INJECTION, SOLUTION SUBCUTANEOUS NIGHTLY
Status: DISCONTINUED | OUTPATIENT
Start: 2018-03-02 | End: 2018-03-03

## 2018-03-02 RX ORDER — CEFTRIAXONE 500 MG/1
500 INJECTION, POWDER, FOR SOLUTION INTRAMUSCULAR; INTRAVENOUS EVERY 24 HOURS
Qty: 4 G | Refills: 0
Start: 2018-03-02 | End: 2018-03-05 | Stop reason: HOSPADM

## 2018-03-02 RX ORDER — INSULIN GLARGINE 100 [IU]/ML
34 INJECTION, SOLUTION SUBCUTANEOUS NIGHTLY
Status: DISCONTINUED | OUTPATIENT
Start: 2018-03-02 | End: 2018-03-02

## 2018-03-02 RX ADMIN — FAMOTIDINE 20 MG: 20 TABLET, FILM COATED ORAL at 09:55

## 2018-03-02 RX ADMIN — WATER 2 G: 1 INJECTION INTRAMUSCULAR; INTRAVENOUS; SUBCUTANEOUS at 17:37

## 2018-03-02 RX ADMIN — INSULIN LISPRO 3 UNITS: 100 INJECTION, SOLUTION INTRAVENOUS; SUBCUTANEOUS at 11:52

## 2018-03-02 RX ADMIN — FAMOTIDINE 20 MG: 20 TABLET, FILM COATED ORAL at 21:04

## 2018-03-02 RX ADMIN — ENOXAPARIN SODIUM 40 MG: 40 INJECTION SUBCUTANEOUS at 09:55

## 2018-03-02 RX ADMIN — IPRATROPIUM BROMIDE AND ALBUTEROL SULFATE 1 AMPULE: .5; 3 SOLUTION RESPIRATORY (INHALATION) at 19:20

## 2018-03-02 RX ADMIN — TAMSULOSIN HYDROCHLORIDE 0.4 MG: 0.4 CAPSULE ORAL at 09:55

## 2018-03-02 RX ADMIN — METOPROLOL TARTRATE 25 MG: 25 TABLET, FILM COATED ORAL at 10:09

## 2018-03-02 RX ADMIN — LISINOPRIL 10 MG: 10 TABLET ORAL at 10:09

## 2018-03-02 RX ADMIN — INSULIN LISPRO 9 UNITS: 100 INJECTION, SOLUTION INTRAVENOUS; SUBCUTANEOUS at 17:36

## 2018-03-02 RX ADMIN — IPRATROPIUM BROMIDE AND ALBUTEROL SULFATE 1 AMPULE: .5; 3 SOLUTION RESPIRATORY (INHALATION) at 10:20

## 2018-03-02 RX ADMIN — Medication 32 UNITS: at 21:05

## 2018-03-02 RX ADMIN — Medication 10 ML: at 17:37

## 2018-03-02 RX ADMIN — METOPROLOL TARTRATE 25 MG: 25 TABLET, FILM COATED ORAL at 21:04

## 2018-03-02 RX ADMIN — INSULIN LISPRO 9 UNITS: 100 INJECTION, SOLUTION INTRAVENOUS; SUBCUTANEOUS at 09:55

## 2018-03-02 RX ADMIN — FINASTERIDE 5 MG: 5 TABLET, FILM COATED ORAL at 09:55

## 2018-03-02 RX ADMIN — INSULIN LISPRO 9 UNITS: 100 INJECTION, SOLUTION INTRAVENOUS; SUBCUTANEOUS at 21:05

## 2018-03-02 RX ADMIN — PREDNISONE 10 MG: 10 TABLET ORAL at 09:55

## 2018-03-02 RX ADMIN — Medication 10 ML: at 09:55

## 2018-03-02 RX ADMIN — Medication 10 ML: at 21:04

## 2018-03-02 RX ADMIN — ROPINIROLE HYDROCHLORIDE 2 MG: 2 TABLET, FILM COATED ORAL at 21:04

## 2018-03-02 RX ADMIN — IPRATROPIUM BROMIDE AND ALBUTEROL SULFATE 1 AMPULE: .5; 3 SOLUTION RESPIRATORY (INHALATION) at 06:52

## 2018-03-02 RX ADMIN — SIMVASTATIN 20 MG: 20 TABLET, FILM COATED ORAL at 21:04

## 2018-03-02 RX ADMIN — Medication 10 ML: at 21:05

## 2018-03-02 RX ADMIN — IPRATROPIUM BROMIDE AND ALBUTEROL SULFATE 1 AMPULE: .5; 3 SOLUTION RESPIRATORY (INHALATION) at 16:47

## 2018-03-02 ASSESSMENT — PAIN DESCRIPTION - ORIENTATION: ORIENTATION: LEFT

## 2018-03-02 ASSESSMENT — ENCOUNTER SYMPTOMS
NAUSEA: 0
ABDOMINAL PAIN: 0
BLURRED VISION: 0
VOMITING: 0
DOUBLE VISION: 0
BACK PAIN: 1
SHORTNESS OF BREATH: 0

## 2018-03-02 ASSESSMENT — PAIN DESCRIPTION - PAIN TYPE: TYPE: CHRONIC PAIN

## 2018-03-02 ASSESSMENT — PAIN DESCRIPTION - DESCRIPTORS: DESCRIPTORS: ACHING

## 2018-03-02 ASSESSMENT — PAIN SCALES - GENERAL
PAINLEVEL_OUTOF10: 2
PAINLEVEL_OUTOF10: 2

## 2018-03-02 ASSESSMENT — PAIN DESCRIPTION - LOCATION
LOCATION: BACK
LOCATION: BACK

## 2018-03-02 NOTE — PROGRESS NOTES
[facility]: NARAYAN SALDANA   INPATIENT OCCUPATIONAL THERAPY  PROGRESS NOTE  Date: 3/2/2018  Patient Name: Carmen Ball      Room:   MRN: [id number]    : 1940  ([de-identified] y.o.) Gender: male      Diagnosis: pt admit 2-21 with a fall, CT head, neck and pelvis negative, septicemia 2/2 pneumonia vs UTI,sepsis, COPD, CT L elbow to r/o septic arthritis; PMHx of 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, plan for LUIS  General  Patient assessed for rehabilitation services?: Yes  Additional Pertinent Hx: pt has had 2 more falls this hospital stay  Family / Caregiver Present: Yes (son present and supportive, has his own health problems)  Diagnosis: pt admit 2-21 with a fall, CT head, neck and pelvis negative, septicemia 2/2 pneumonia vs UTI,sepsis, COPD, CT L elbow to r/o septic arthritis; PMHx of 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, plan for LUIS    Restrictions  Restrictions/Precautions: Fall Risk, Contact Precautions  Other position/activity restrictions: on nc oxygen  Position Activity Restriction  Other position/activity restrictions: on nc oxygen       Subjective  Subjective: pt states that he is feeling better today  Comments: pt alert and sitting up in chair and spouse in room as writer arrives/departed  Patient Currently in Pain: Yes  Pain Level: 2  Pain Location: Back (\"all over\")     Patient Observation  Observations: 3 L n/c    Objective        Balance  Sitting Balance: Supervision  Standing Balance: Minimal assistance  Standing Balance  Time: 4-3 min c RW  Activity: weight shift with UE support  Sit to stand: Minimal assistance  Stand to sit: Contact guard assistance (VCs for hand placement c G return)  Comment: pt unsteady, no LOB noted      ADL  Feeding: Independent     Transfers  Sit to stand: Minimal assistance  Stand to sit: Contact guard assistance  Upper Extremity Function  UE Strengthing: BUE, HEP c yellow theraband in all planes x 15 reps to support mobilityand daily activies       Assessment  Performance deficits /

## 2018-03-02 NOTE — PROGRESS NOTES
hours.    Invalid input(s): AMYLASE,  ALB  INR No results for input(s): INR in the last 72 hours. PTT No results for input(s): APTT in the last 72 hours. BNP No results for input(s): BNP in the last 72 hours.     RADIOLOGY     (See actual reports for details)    ASSESSMENT/PLAN     Acute on chronic respiratory failure with hypoxia extubated 2/26 after 3 days on vent/ on 3 L O2 Prior  COPD  Hypercapnia  ANTONY  MSSA bacteremia negative vegetation on LUIS  Thrombocytopenia/chronic  History of hypertension/diabetes     Plan:  O2 /on 3 L at home  Bronchodilators,  prednisone taper  Antibiotic per ID/Going to have  IV Rocephin through March 10  On Lovenox   Awaiting placement    Electronically signed by Shaka Chávez MD on 3/2/2018 at 11:37 AM

## 2018-03-02 NOTE — PLAN OF CARE
Problem: Falls - Risk of  Goal: Absence of falls  Outcome: Ongoing  Pt assessed as a fall risk this shift. Remains free from falls and accidental injury at this time. Fall precautions in place, including falling star sign and fall risk band on pt. Floor free from obstacles, and bed is locked and in lowest position. Adequate lighting provided. Pt encouraged to call before getting OOB for any need. Bed/chair alarm activated. Will continue to monitor needs during hourly rounding, and reinforce education on use of call light. Problem: OXYGENATION/RESPIRATORY FUNCTION  Goal: Patient will maintain patent airway  Outcome: Ongoing  Patient resp rate 14-16 breath/min; pulse ox 98% on 3L oxygen via nasal canula; lung sounds clear bilat; will continue to monitor oxygenation/respiration. Problem: Gas Exchange - Impaired:  Goal: Levels of oxygenation will improve  Levels of oxygenation will improve   Outcome: Ongoing  Patient resp rate 14-16 breath/min; pulse ox 98% on 3L oxygen via nasal canula; lung sounds clear bilat; will continue to monitor oxygenation/respiration. Problem: Infection, Septic Shock:  Goal: Will show no infection signs and symptoms  Will show no infection signs and symptoms   Outcome: Ongoing  Patient remains afebrile; WBC count is within normal limits; no signs of erythema, edema, or warmth. Will continue to monitor for signs/symptoms of infection. Problem: Pain:  Goal: Control of acute pain  Control of acute pain   Outcome: Ongoing  No pain at this time. 0/10 pain scale.

## 2018-03-02 NOTE — PROGRESS NOTES
Functional Mobility Training, Transfer Training, Gait Training  Safety Devices  Type of devices: (P) Call light within reach, Left in chair, Nurse notified, Gait belt     Therapy Time   Individual Concurrent Group Co-treatment   Time In 1150         Time Out 1215         Minutes 120 [facility], [facility]   Electronically signed by Cameron Jimenez PTA on 3/2/2018 at 3:49 PM

## 2018-03-02 NOTE — PROGRESS NOTES
into the skin 2 times daily (before meals) 5 Pen 5    lovastatin (MEVACOR) 40 MG tablet TAKE 1 TABLET BY MOUTH NIGHTLY 90 tablet 1    citalopram (CELEXA) 20 MG tablet TAKE 1 TABLET BY MOUTH EVERY MORNING 90 tablet 1    PROAIR  (90 BASE) MCG/ACT inhaler INHALE 2 PUFFS USING INHALER EVERY 4 HOURS AS NEEDED 6 Inhaler 3    fluticasone-salmeterol (ADVAIR) 250-50 MCG/DOSE AEPB Inhale 1 puff into the lungs every 12 hours. (Patient taking differently: Inhale 1 puff into the lungs every 12 hours as needed ) 60 each 5    quinapril (ACCUPRIL) 40 MG tablet Take 1 tablet by mouth daily 90 tablet 3    B-D UF III MINI PEN NEEDLES 31G X 5 MM MISC USE WITH INSULIN 4 TIMES DAILY 100 each 2           Allergies  Allergies   Allergen Reactions    Ativan [Lorazepam]      hallucinations    Codeine      Cough syrup gi upset        Social   Social History   Substance Use Topics    Smoking status: Former Smoker     Packs/day: 2.00     Years: 50.00     Types: Cigarettes     Quit date: 12/14/2015    Smokeless tobacco: Never Used    Alcohol use No      Comment: rarely             Family History   Problem Relation Age of Onset    Diabetes Mother     Other Other      Testicular rhabomyosarcoma           Review of Systems        Tolerating antibiotics.          Physical Exam  /76   Pulse 76   Temp 98 °F (36.7 °C) (Oral)   Resp 16   Ht 5' 7\" (1.702 m)   Wt 191 lb 5.8 oz (86.8 kg)   SpO2 97%   BMI 29.97 kg/m²           General Appearance: AO3 ,NAD  Skin: warm and dry, no rash,upper back  ulcer with less  surrounding erythema ,no drainage     Neck: neck supple    Pulmonary/Chest: Decreased aeration bilaterally-  Cardiovascular: normal rate, regular rhythm, normal S1 and S2, no murmurs  Abdomen: soft, non-distended, normal bowel sounds  Extremities: no cyanosis, clubbing or edema  Musculoskeletal:  no joint swelling  MEDLINE SITE OK     Data Review:    Recent Labs      02/28/18   0433  03/01/18   0535  03/02/18   411

## 2018-03-02 NOTE — PROGRESS NOTES
[facility]    Radiology:    Xr Chest Standard (2 Vw)    Result Date: 2/21/2018  EXAMINATION: TWO VIEWS OF THE CHEST 2/21/2018 4:08 pm COMPARISON: Two-view chest from 03/24/2017 HISTORY: ORDERING SYSTEM PROVIDED HISTORY: fall TECHNOLOGIST PROVIDED HISTORY: Reason for exam:->fall Ordering Physician Provided Reason for Exam: fall Acuity: Acute Type of Exam: Initial Mechanism of Injury: Pt states that he got dizzy and fell off toilet today. Left hip discomfort Relevant Medical/Surgical History: Pt states that he got dizzy and fell off toilet today. Left hip discomfort Additional history of hypertension, bladder cancer, gastroesophageal reflux disease, asthma, tobacco abuse, chronic obstructive pulmonary disease and respiratory failure, and multiple drug resistant organisms. FINDINGS: Overlying ECG monitor leads. Mildly enlarged but stable appearing cardiac silhouette. Uncoiled and calcified thoracic aorta, similar by comparison. Mild basilar atelectasis or scarring, best seen right lateral base. No consolidation or sizable pleural effusion. No pneumothorax. No obvious rib fracture. Some loss mid thoracic vertebral body height of indeterminate age. Mild kyphoscoliosis thoracic spine with some osteopenia and DJD. Clips status post cholecystectomy. No acute cardiopulmonary disease. Bibasilar atelectasis or scarring. Some loss mid thoracic vertebral body height, possibly chronic. Correlate clinically. Xr Pelvis (1-2 Views)    Result Date: 2/21/2018  EXAMINATION: SINGLE VIEW OF THE PELVIS 2/21/2018 4:09 pm COMPARISON: CT angiography of the abdomen and pelvis dated 07/19/2011 HISTORY: ORDERING SYSTEM PROVIDED HISTORY: fall TECHNOLOGIST PROVIDED HISTORY: Reason for exam:->fall Ordering Physician Provided Reason for Exam: fall Acuity: Acute Type of Exam: Initial Mechanism of Injury: Pt states that he got dizzy and fell off toilet today.  Left hip discomfort Relevant Medical/Surgical History: Pt states TISSUES/SKULL:  No acute abnormality of the visualized skull or soft tissues. No acute intracranial abnormality. Moderate mucoperiosteal thickening in the sphenoid sinus. Ct Cervical Spine Wo Contrast    Result Date: 2/21/2018  EXAMINATION: CT OF THE CERVICAL SPINE WITHOUT CONTRAST 2/21/2018 4:25 pm TECHNIQUE: CT of the cervical spine was performed without the administration of intravenous contrast. Multiplanar reformatted images are provided for review. Dose modulation, iterative reconstruction, and/or weight based adjustment of the mA/kV was utilized to reduce the radiation dose to as low as reasonably achievable. COMPARISON: None. HISTORY: ORDERING SYSTEM PROVIDED HISTORY: fall History of bladder cancer, osteoarthritis, skin cancer. FINDINGS: BONES/ALIGNMENT: There is no evidence of an acute cervical spine fracture. There is normal alignment of the cervical spine. DEGENERATIVE CHANGES: Advanced multilevel degenerative changes, most severe C4-C7 with marked disc space narrowing and endplate degenerative sclerosis with disc osteophyte complex type findings C5-C6 and C6-C7. Mild impingement on the canal and moderate -severe impingement on the neural foramina noted, the latter greatest on the left at C5 -C6 and C6 -C7. Mild multilevel facet arthropathy and additional spondylitic changes. Some degenerative change C1-C2 with mild pannus formation. No bony erosion. Some degenerative change noted visualized thoracic spine with a small lucency T2 on the left, and some lucency within the pedicle at T3 on the same side. SOFT TISSUES: There is no prevertebral soft tissue swelling. Mucosal thickening visualized sphenoid sinus. No air-fluid level noted. Carotid bifurcation vascular calcifications. Emphysematous changes mid upper visualized lungs. No acute abnormality of the cervical spine. Advanced multilevel degenerative changes, most severe C5-C7 with associated findings, as above. Emphysema/ COPD. Nonacute appearing sphenoid sinus disease. Several small bony lucencies left T2 vertebral body and left pedicle at T3 likely incidental/ related to some degree of osteopenia ; correlate clinically and obtain follow-up imaging as indicated. Ct Chest Pulmonary Embolism W Contrast    Result Date: 2/21/2018  EXAMINATION: CTA OF THE CHEST 2/21/2018 5:23 pm TECHNIQUE: CTA of the chest was performed after the administration of intravenous contrast.  Multiplanar reformatted images are provided for review. MIP images are provided for review. Dose modulation, iterative reconstruction, and/or weight based adjustment of the mA/kV was utilized to reduce the radiation dose to as low as reasonably achievable. COMPARISON: 11/10/2015 HISTORY: ORDERING SYSTEM PROVIDED HISTORY: sob, elevated d-dimer. A 75-year-old male with shortness of breath and elevated D-dimer FINDINGS: Pulmonary Arteries: No obvious filling defect identified within the visualized pulmonary arterial vasculature that meets the criteria for pulmonary embolus. Evaluation of the bibasilar segmental and subsegmental pulmonary arteries is limited due to respiratory motion. Mediastinum: Visualized thyroid gland grossly unremarkable. No axillary, mediastinal, or hilar lymphadenopathy. Coronary artery disease. No pericardial or sizable pleural effusions. Diffuse atherosclerotic calcification and atheromatous plaque of the visualized aorta and branch vasculature. Mild cardiomegaly. Lungs/Pleura: Trachea and proximal central airways appear patent. Opacity within the right posterolateral trachea which may represent mucoid or aspirated material on image 29, series 7. Respiratory motion and dependent atelectasis within both lungs. Moderate emphysema. Upper Abdomen: Vascular stent graft is seen within the abdominal aorta. Partially visualized abdominal aortic aneurysm measuring 2.9 x 3.1 cm on image 148, series 5. Diffuse fatty infiltration of the liver.   Prior sitting in chair comfortable, in no distress, on NC   HENT:   Head: Normocephalic and atraumatic. Eyes: Right eye exhibits no discharge. Left eye exhibits no discharge. Cardiovascular: Intact distal pulses. Murmur (systolic) heard. Pulmonary/Chest: Effort normal. No respiratory distress. He has no wheezes. He exhibits tenderness (right lower chest from fall). Abdominal: Soft. He exhibits no distension. Musculoskeletal: He exhibits tenderness (lumbar pain). He exhibits no edema. Neurological: He is alert and oriented to person, place, and time. Skin: Skin is warm and dry. He is not diaphoretic. Assessment:        Primary Problem  Sepsis [facility])    Active Hospital Problems    Diagnosis Date Noted    Bacteremia [R78.81]     Thrombocytopenia ([facility] [D69.6] 02/22/2018    Sepsis ([facility] [A41.9] 02/21/2018    Diabetes mellitus type 2, insulin dependent [facility] [E11.9, Z79.4] 02/21/2018    Hypertension [I10]     Hyperlipidemia [E78.5]     COPD (chronic obstructive pulmonary disease) ([facility] [J44.9]        Plan:         MSSA septicemia 2/2 pneumonia - resolved  - 2 sets BCx + MSSA; third set blood cultures (2/26) NGTD 4 days  - LUIS without evidence of vegetations  - PICC placement for IV rocephin til 3/10 per ID recs  - awaiting precert    Acute on chronic hypoxic & hypercapneic respiratory failure, resolving  - COPD on 3L home O2  - LLB airspace disease with partial clearing  - cont rocephin    HTN - overcorrected  - decreased lisinopril to 10 mg    DM II - hyperglycemic  - inc Lantus 32 QHS  - high dose ISS  - Hypoglycemia protocol; POCT glucose x4  - f/u A1C    Thrombocytopenia, chronic  - HCV negative  - Steady 90-110s, monitor    S/p fall x2  - Patient lightheaded, per family poor fluid intake  - No LOC, no head injury  - PT/OT    BPH  - cont Flomax, Proscar.     Merna Crenshaw MD  3/2/2018  12:13 PM   Attending Physician Statement  Patient seen and examined  I have discussed the care of the

## 2018-03-03 LAB
ABSOLUTE EOS #: 0.2 K/UL (ref 0–0.4)
ABSOLUTE IMMATURE GRANULOCYTE: ABNORMAL K/UL (ref 0–0.3)
ABSOLUTE LYMPH #: 1.5 K/UL (ref 1–4.8)
ABSOLUTE MONO #: 0.6 K/UL (ref 0.1–1.3)
ANION GAP SERPL CALCULATED.3IONS-SCNC: 10 MMOL/L (ref 9–17)
BASOPHILS # BLD: 0 % (ref 0–2)
BASOPHILS ABSOLUTE: 0 K/UL (ref 0–0.2)
BUN BLDV-MCNC: 23 MG/DL (ref 8–23)
BUN/CREAT BLD: ABNORMAL (ref 9–20)
CALCIUM SERPL-MCNC: 8.9 MG/DL (ref 8.6–10.4)
CHLORIDE BLD-SCNC: 97 MMOL/L (ref 98–107)
CO2: 32 MMOL/L (ref 20–31)
CREAT SERPL-MCNC: 0.71 MG/DL (ref 0.7–1.2)
DIFFERENTIAL TYPE: ABNORMAL
EOSINOPHILS RELATIVE PERCENT: 2 % (ref 0–4)
GFR AFRICAN AMERICAN: >60 ML/MIN
GFR NON-AFRICAN AMERICAN: >60 ML/MIN
GFR SERPL CREATININE-BSD FRML MDRD: ABNORMAL ML/MIN/{1.73_M2}
GFR SERPL CREATININE-BSD FRML MDRD: ABNORMAL ML/MIN/{1.73_M2}
GLUCOSE BLD-MCNC: 191 MG/DL (ref 75–110)
GLUCOSE BLD-MCNC: 207 MG/DL (ref 70–99)
GLUCOSE BLD-MCNC: 216 MG/DL (ref 75–110)
GLUCOSE BLD-MCNC: 227 MG/DL (ref 75–110)
GLUCOSE BLD-MCNC: 299 MG/DL (ref 75–110)
HCT VFR BLD CALC: 40.2 % (ref 41–53)
HEMOGLOBIN: 13.8 G/DL (ref 13.5–17.5)
IMMATURE GRANULOCYTES: ABNORMAL %
LYMPHOCYTES # BLD: 16 % (ref 24–44)
MCH RBC QN AUTO: 33.7 PG (ref 26–34)
MCHC RBC AUTO-ENTMCNC: 34.3 G/DL (ref 31–37)
MCV RBC AUTO: 98.3 FL (ref 80–100)
MONOCYTES # BLD: 7 % (ref 1–7)
NRBC AUTOMATED: ABNORMAL PER 100 WBC
PDW BLD-RTO: 13 % (ref 11.5–14.9)
PLATELET # BLD: 128 K/UL (ref 150–450)
PLATELET ESTIMATE: ABNORMAL
PMV BLD AUTO: 9.2 FL (ref 6–12)
POTASSIUM SERPL-SCNC: 3.9 MMOL/L (ref 3.7–5.3)
RBC # BLD: 4.09 M/UL (ref 4.5–5.9)
RBC # BLD: ABNORMAL 10*6/UL
SEG NEUTROPHILS: 75 % (ref 36–66)
SEGMENTED NEUTROPHILS ABSOLUTE COUNT: 6.9 K/UL (ref 1.3–9.1)
SODIUM BLD-SCNC: 139 MMOL/L (ref 135–144)
WBC # BLD: 9.2 K/UL (ref 3.5–11)
WBC # BLD: ABNORMAL 10*3/UL

## 2018-03-03 PROCEDURE — 94640 AIRWAY INHALATION TREATMENT: CPT

## 2018-03-03 PROCEDURE — 94761 N-INVAS EAR/PLS OXIMETRY MLT: CPT

## 2018-03-03 PROCEDURE — 6370000000 HC RX 637 (ALT 250 FOR IP): Performed by: STUDENT IN AN ORGANIZED HEALTH CARE EDUCATION/TRAINING PROGRAM

## 2018-03-03 PROCEDURE — 2580000003 HC RX 258: Performed by: STUDENT IN AN ORGANIZED HEALTH CARE EDUCATION/TRAINING PROGRAM

## 2018-03-03 PROCEDURE — 99232 SBSQ HOSP IP/OBS MODERATE 35: CPT | Performed by: INTERNAL MEDICINE

## 2018-03-03 PROCEDURE — 97116 GAIT TRAINING THERAPY: CPT

## 2018-03-03 PROCEDURE — 1200000000 HC SEMI PRIVATE

## 2018-03-03 PROCEDURE — 6370000000 HC RX 637 (ALT 250 FOR IP): Performed by: NURSE PRACTITIONER

## 2018-03-03 PROCEDURE — 6370000000 HC RX 637 (ALT 250 FOR IP): Performed by: RADIOLOGY

## 2018-03-03 PROCEDURE — 6370000000 HC RX 637 (ALT 250 FOR IP): Performed by: INTERNAL MEDICINE

## 2018-03-03 PROCEDURE — 85025 COMPLETE CBC W/AUTO DIFF WBC: CPT

## 2018-03-03 PROCEDURE — 97110 THERAPEUTIC EXERCISES: CPT

## 2018-03-03 PROCEDURE — 6360000002 HC RX W HCPCS: Performed by: STUDENT IN AN ORGANIZED HEALTH CARE EDUCATION/TRAINING PROGRAM

## 2018-03-03 PROCEDURE — 80048 BASIC METABOLIC PNL TOTAL CA: CPT

## 2018-03-03 PROCEDURE — 36415 COLL VENOUS BLD VENIPUNCTURE: CPT

## 2018-03-03 PROCEDURE — 2580000003 HC RX 258: Performed by: INTERNAL MEDICINE

## 2018-03-03 PROCEDURE — 6360000002 HC RX W HCPCS: Performed by: INTERNAL MEDICINE

## 2018-03-03 PROCEDURE — 82947 ASSAY GLUCOSE BLOOD QUANT: CPT

## 2018-03-03 RX ORDER — FUROSEMIDE 10 MG/ML
40 INJECTION INTRAMUSCULAR; INTRAVENOUS ONCE
Status: DISCONTINUED | OUTPATIENT
Start: 2018-03-04 | End: 2018-03-03

## 2018-03-03 RX ORDER — FUROSEMIDE 10 MG/ML
40 INJECTION INTRAMUSCULAR; INTRAVENOUS ONCE
Status: COMPLETED | OUTPATIENT
Start: 2018-03-04 | End: 2018-03-04

## 2018-03-03 RX ORDER — INSULIN GLARGINE 100 [IU]/ML
40 INJECTION, SOLUTION SUBCUTANEOUS NIGHTLY
Status: DISCONTINUED | OUTPATIENT
Start: 2018-03-03 | End: 2018-03-20 | Stop reason: HOSPADM

## 2018-03-03 RX ADMIN — INSULIN LISPRO 9 UNITS: 100 INJECTION, SOLUTION INTRAVENOUS; SUBCUTANEOUS at 18:02

## 2018-03-03 RX ADMIN — ROPINIROLE HYDROCHLORIDE 2 MG: 2 TABLET, FILM COATED ORAL at 23:18

## 2018-03-03 RX ADMIN — Medication 10 ML: at 21:04

## 2018-03-03 RX ADMIN — INSULIN LISPRO 3 UNITS: 100 INJECTION, SOLUTION INTRAVENOUS; SUBCUTANEOUS at 09:38

## 2018-03-03 RX ADMIN — FAMOTIDINE 20 MG: 20 TABLET, FILM COATED ORAL at 09:38

## 2018-03-03 RX ADMIN — Medication 10 ML: at 18:03

## 2018-03-03 RX ADMIN — SIMVASTATIN 20 MG: 20 TABLET, FILM COATED ORAL at 20:56

## 2018-03-03 RX ADMIN — TAMSULOSIN HYDROCHLORIDE 0.4 MG: 0.4 CAPSULE ORAL at 09:38

## 2018-03-03 RX ADMIN — INSULIN LISPRO 6 UNITS: 100 INJECTION, SOLUTION INTRAVENOUS; SUBCUTANEOUS at 21:06

## 2018-03-03 RX ADMIN — INSULIN LISPRO 6 UNITS: 100 INJECTION, SOLUTION INTRAVENOUS; SUBCUTANEOUS at 15:51

## 2018-03-03 RX ADMIN — ENOXAPARIN SODIUM 40 MG: 40 INJECTION SUBCUTANEOUS at 09:37

## 2018-03-03 RX ADMIN — METOPROLOL TARTRATE 25 MG: 25 TABLET, FILM COATED ORAL at 20:55

## 2018-03-03 RX ADMIN — IPRATROPIUM BROMIDE AND ALBUTEROL SULFATE 1 AMPULE: .5; 3 SOLUTION RESPIRATORY (INHALATION) at 11:02

## 2018-03-03 RX ADMIN — FINASTERIDE 5 MG: 5 TABLET, FILM COATED ORAL at 09:38

## 2018-03-03 RX ADMIN — Medication 10 ML: at 20:56

## 2018-03-03 RX ADMIN — ACETAMINOPHEN 650 MG: 325 TABLET, FILM COATED ORAL at 02:17

## 2018-03-03 RX ADMIN — Medication 40 UNITS: at 21:17

## 2018-03-03 RX ADMIN — FAMOTIDINE 20 MG: 20 TABLET, FILM COATED ORAL at 20:55

## 2018-03-03 RX ADMIN — LISINOPRIL 10 MG: 10 TABLET ORAL at 09:38

## 2018-03-03 RX ADMIN — IPRATROPIUM BROMIDE AND ALBUTEROL SULFATE 1 AMPULE: .5; 3 SOLUTION RESPIRATORY (INHALATION) at 15:41

## 2018-03-03 RX ADMIN — WATER 2 G: 1 INJECTION INTRAMUSCULAR; INTRAVENOUS; SUBCUTANEOUS at 18:02

## 2018-03-03 RX ADMIN — METOPROLOL TARTRATE 25 MG: 25 TABLET, FILM COATED ORAL at 09:38

## 2018-03-03 RX ADMIN — IPRATROPIUM BROMIDE AND ALBUTEROL SULFATE 1 AMPULE: .5; 3 SOLUTION RESPIRATORY (INHALATION) at 20:11

## 2018-03-03 RX ADMIN — ACETAMINOPHEN 650 MG: 325 TABLET, FILM COATED ORAL at 13:25

## 2018-03-03 RX ADMIN — PREDNISONE 10 MG: 10 TABLET ORAL at 09:38

## 2018-03-03 RX ADMIN — IPRATROPIUM BROMIDE AND ALBUTEROL SULFATE 1 AMPULE: .5; 3 SOLUTION RESPIRATORY (INHALATION) at 07:03

## 2018-03-03 RX ADMIN — Medication 10 ML: at 21:05

## 2018-03-03 ASSESSMENT — ENCOUNTER SYMPTOMS
BLURRED VISION: 0
BACK PAIN: 1
SHORTNESS OF BREATH: 0
ABDOMINAL PAIN: 0
NAUSEA: 0
VOMITING: 0
DOUBLE VISION: 0

## 2018-03-03 ASSESSMENT — PAIN DESCRIPTION - LOCATION
LOCATION: RIB CAGE
LOCATION: RIB CAGE

## 2018-03-03 ASSESSMENT — PAIN SCALES - GENERAL
PAINLEVEL_OUTOF10: 10
PAINLEVEL_OUTOF10: 9
PAINLEVEL_OUTOF10: 9

## 2018-03-03 ASSESSMENT — PAIN DESCRIPTION - ORIENTATION
ORIENTATION: RIGHT
ORIENTATION: RIGHT

## 2018-03-03 ASSESSMENT — PAIN DESCRIPTION - PAIN TYPE
TYPE: ACUTE PAIN
TYPE: ACUTE PAIN

## 2018-03-03 NOTE — PROGRESS NOTES
Pulmonary Progress Note  [facility]      Patient - Anthony Givens,  Age - [de-identified] y.o.    - 1940      Room Number - 2117/2117-01   N -  [id number]   [facility]  Date of Admission -  2018  2:48 PM        Consulting [address] [place] [address],[address], MD  Primary Care Physician - Ratna Hill, DO     SUBJECTIVE   Remains fairly  stable  Complains of lower extremity swelling, bilaterally    OBJECTIVE   VITALS    height is 5' 7\" (1.702 m) and weight is 181 lb (82.1 kg). His oral temperature is 97.8 °F (36.6 °C). His blood pressure is 124/73 and his pulse is 71. His respiration is 22 and oxygen saturation is 94%. Body mass index is 28.35 kg/m². Temperature Range: Temp: 97.8 °F (36.6 °C) Temp  Av.6 °F (36.4 °C)  Min: 97.2 °F (36.2 °C)  Max: 98 °F (36.7 °C)  BP Range:  Systolic (17ARL), WUR:070 , Min:114 , TWI:703     Diastolic (91OER), NUY:53, Min:73, Max:84    Pulse Range: Pulse  Av.3  Min: 71  Max: 93  Respiration Range: Resp  Av.3  Min: 16  Max: 22  Current Pulse Ox[de-identified]  SpO2: 94 %  24HR Pulse Ox Range:  SpO2  Av.8 %  Min: 91 %  Max: 97 %  Oxygen Amount and Delivery: O2 Flow Rate (L/min): 3 L/min    Wt Readings from Last 3 Encounters:   18 181 lb (82.1 kg)   17 194 lb 9.6 oz (88.3 kg)   17 180 lb (81.6 kg)       I/O (24 Hours)    Intake/Output Summary (Last 24 hours) at 18 1247  Last data filed at 18 0641   Gross per 24 hour   Intake             1040 ml   Output              875 ml   Net              165 ml       EXAM     General Appearance  Awake, alert, oriented, in no acute distress  HEENT - normocephalic, atraumatic. Neck - Supple,  trachea midline   Lungs - coarse no wheezing  Heart Exam:PMI normal. No lifts, heaves, or thrills. RRR. No murmurs, clicks, gallops, or rubs  Abdomen Exam: Abdomen soft, non-tender.   Extremity Exam: 1-2 + edema present    MEDS      insulin glargine  40 Units Subcutaneous Nightly    lisinopril  10 mg Oral Daily    predniSONE  10 mg Oral Daily    insulin lispro  0-18 Units Subcutaneous 4x Daily AC & HS    ipratropium-albuterol  1 ampule Inhalation 4x daily    rOPINIRole  2 mg Oral Nightly    metoprolol tartrate  25 mg Oral BID    famotidine  20 mg Oral BID    cefTRIAXone (ROCEPHIN) IV  2 g Intravenous Q24H    tamsulosin  0.4 mg Oral Daily    sodium chloride flush  10 mL Intravenous 2 times per day    enoxaparin  40 mg Subcutaneous Daily    finasteride  5 mg Oral Daily    simvastatin  20 mg Oral Nightly      dexmedetomidine (PRECEDEX) IV infusion Stopped (02/26/18 1835)    dextrose       hydrALAZINE, ondansetron, albuterol sulfate HFA, sodium chloride flush, acetaminophen, sodium phosphate IVPB **OR** sodium phosphate IVPB, potassium chloride **OR** potassium chloride **OR** potassium chloride, magnesium sulfate, glucose, dextrose, glucagon (rDNA), dextrose, albuterol    LABS   CBC   Recent Labs      03/03/18   0419   WBC  9.2   HGB  13.8   HCT  40.2*   MCV  98.3   PLT  128*     BMP: Lab Results   Component Value Date     03/03/2018    K 3.9 03/03/2018    CL 97 03/03/2018    CO2 32 03/03/2018    BUN 23 03/03/2018    LABALBU 3.6 02/21/2018    CREATININE 0.71 03/03/2018    CALCIUM 8.9 03/03/2018    GFRAA >60 03/03/2018    LABGLOM >60 03/03/2018     ABGs:  Lab Results   Component Value Date    PHART 7.438 02/26/2018    PO2ART 72.5 02/26/2018    BZG7MOZ 43.3 02/26/2018    Lab Results   Component Value Date    MODE CPAP 02/26/2018     Ionized Calcium:  No results found for: IONCA  Magnesium:    Lab Results   Component Value Date    MG 2.5 02/26/2018     Phosphorus:    Lab Results   Component Value Date    PHOS 2.1 02/26/2018        LIVER PROFILE No results for input(s): AST, ALT, LIPASE, BILIDIR, BILITOT, ALKPHOS in the last 72 hours. Invalid input(s): AMYLASE,  ALB  INR No results for input(s): INR in the last 72 hours.   PTT   Lab Results   Component

## 2018-03-03 NOTE — PLAN OF CARE
Problem: Falls - Risk of  Goal: Absence of falls  Outcome: Met This Shift  Bed alarm remains on ; pull away used when up to chair    Problem: OXYGENATION/RESPIRATORY FUNCTION  Goal: Patient will maintain patent airway  Outcome: Met This Shift    Goal: Patient will achieve/maintain normal respiratory rate/effort  Respiratory rate and effort will be within normal limits for the patient   Outcome: Met This Shift      Problem: Gas Exchange - Impaired:  Goal: Levels of oxygenation will improve  Levels of oxygenation will improve   Outcome: Met This Shift      Problem: Pain:  Goal: Control of acute pain  Control of acute pain   Outcome: Met This Shift

## 2018-03-03 NOTE — PROGRESS NOTES
walker.   Short term goal 4: Improve standing balance to good  Long term goals  Time Frame for Long term goals : 8 sessions  Long term goal 1: Independent ambulation 120 feet with rolling walker    PT Individual Minutes  Time In: (P) 1435  Time Out: (P) 1505  Minutes: (P) 30    Electronically signed by Matthew Campoverde PTA on 3/3/18 at 3:05 PM         03/03/18 1504   PT Individual Minutes   Time In 1435   Time Out 1505   Minutes 30

## 2018-03-03 NOTE — PROGRESS NOTES
[id number]    Progress Note    3/3/2018    9:19 AM    Name:   Bronwyn Sanchez  MRN:     [id number]     Acct:      [de-identified]   Room:   211/2[facility] Day:  10  Admit Date:  2/21/2018  2:48 PM    PCP:   Lalita Sotelo DO  Code Status:  Full Code    Subjective:     C/C:   Chief Complaint   Patient presents with    Shortness of Breath     Interval History Status: improved. Patient seen and assessed at bedside. No respiratory complaints on NC this morning. C/o of pain with coughing. Denies fevers/chills, SOB, CP. Worked with PT/OT today, tolerating PO. Awaiting precert for [facility]. Brief History:     The patient is a 76 y.o. Non-/non  male who presents with Shortness of Breath and he is admitted to the hospital for the management of SOB and lightheadedness. Hx COPD, DM II, HTN, HLD; hx nephrolithiasis and bladder cysts with chronic UTIs per family. Per patient felt lightheaded while using the bathroom, dizziness without syncope, fell from toilet today sdfand was unable to get up. No LOC. Low fever and chills for past 1-2 days. Rhinorrhea and congestion. Nausea without emesis today. Chronic SOB, on 2L home O2 daytime with exercise and nighttime, but patient only using at nighttime. Baseline sputum production, usually unable to bring up. No chest pain. No abd pain. No flank pain. Per patient no change in fluid intake or urination, but per family patient not keeping hydrated. No diarrhea/constipation. Review of Systems:     Review of Systems   Constitutional: Negative for chills and fever. Eyes: Negative for blurred vision and double vision. Respiratory: Negative for shortness of breath. Cardiovascular: Negative for chest pain. Gastrointestinal: Negative for abdominal pain, nausea and vomiting. Genitourinary: Negative for dysuria.    Musculoskeletal: Positive for back pain (paraspinal lumbar, no vertebral body tenderness, as described above). Pain right lower chest s/p fall   Neurological: Negative for headaches. Medications: Allergies: Allergies   Allergen Reactions    Ativan [Lorazepam]      hallucinations    Codeine      Cough syrup gi upset       Current Meds:   Scheduled Meds:    insulin glargine  40 Units Subcutaneous Nightly    lisinopril  10 mg Oral Daily    predniSONE  10 mg Oral Daily    insulin lispro  0-18 Units Subcutaneous 4x Daily AC & HS    ipratropium-albuterol  1 ampule Inhalation 4x daily    rOPINIRole  2 mg Oral Nightly    metoprolol tartrate  25 mg Oral BID    famotidine  20 mg Oral BID    cefTRIAXone (ROCEPHIN) IV  2 g Intravenous Q24H    tamsulosin  0.4 mg Oral Daily    sodium chloride flush  10 mL Intravenous 2 times per day    enoxaparin  40 mg Subcutaneous Daily    finasteride  5 mg Oral Daily    simvastatin  20 mg Oral Nightly     Continuous Infusions:    dexmedetomidine (PRECEDEX) IV infusion Stopped (02/26/18 7875)    dextrose       PRN Meds: hydrALAZINE, ondansetron, albuterol sulfate HFA, sodium chloride flush, acetaminophen, sodium phosphate IVPB **OR** sodium phosphate IVPB, potassium chloride **OR** potassium chloride **OR** potassium chloride, magnesium sulfate, glucose, dextrose, glucagon (rDNA), dextrose, albuterol    Data:     Past Medical History:   has a past medical history of Abdominal aortic aneurysm (AAA) ([facility] Alveolar hypoventilation; Asthma; BCC (basal cell carcinoma of skin); Bladder cancer [facility]); BPH (benign prostatic hyperplasia); Cataracts, bilateral; Chronic constipation; Chronic cor pulmonale ([facility] Chronic hypoxemic respiratory failure (HCC); COPD (chronic obstructive pulmonary disease) ([facility] Depression; Diverticulitis; GERD (gastroesophageal reflux disease); Hard of hearing; History of kidney stones; History of nasal obstruction; History of smoking; Hoarseness; Hyperlipidemia; Hypertension;  Hypertrophy of nasal turbinates; Irritable bowel; MDRO (multiple drug resistant organisms) resistance; Mild obstructive sleep apnea; On supplemental oxygen therapy; Osteoarthritis; Paralysis of vocal cords; Restless legs syndrome; SOB (shortness of breath); Type II or unspecified type diabetes mellitus without mention of complication, not stated as uncontrolled; and Wears glasses. Social History:   reports that he quit smoking about 2 years ago. His smoking use included Cigarettes. He has a 100.00 pack-year smoking history. He has never used smokeless tobacco. He reports that he does not drink alcohol or use drugs. Family History:   Family History   Problem Relation Age of Onset    Diabetes Mother     Other Other      Testicular rhabomyosarcoma        Vitals:  /73   Pulse 71   Temp 97.8 °F (36.6 °C) (Oral)   Resp 22   Ht 5' 7\" (1.702 m)   Wt 181 lb (82.1 kg)   SpO2 94%   BMI 28.35 kg/m²   Temp (24hrs), Av.6 °F (36.4 °C), Min:97.2 °F (36.2 °C), Max:98 °F (36.7 °C)    Recent Labs      18   1111  18   1707  18   2055  18   0753   POCGLU  174*  263*  281*  191*       I/O (24Hr): Intake/Output Summary (Last 24 hours) at 18 0919  Last data filed at 18 0641   Gross per 24 hour   Intake             1140 ml   Output             1025 ml   Net              115 ml       Labs:    [unfilled]    Lab Results   Component Value Date/Time    SPECIAL  2018 09:32 AM     RIGHT HAND, 1mL ANAEROBIC, 5mL AEROBIC Performed at [facility]  2018 09:32 AM      Hospital [address] [place] [address].  [address] [phone]     Lab Results   Component Value Date/Time    CULTURE NO GROWTH 4 DAYS 2018 09:32 AM    CULTURE  2018 09:32 AM     Performed at [address] [phone]       [facility]    Radiology:    Xr Chest Standard (2 Vw)    Result Date: 2018  EXAMINATION: TWO VIEWS OF THE CHEST 2018 4:08 pm bilateral perinephric stranding. Fluid density lesions within both kidneys most likely representing renal cysts, the largest which is partially visualized at the midpole of the right kidney measuring up to 3.3 cm on image 144, series 5. Possible punctate 1 mm nonobstructing calculus on image 148, series 5. 1.3 cm fat containing right adrenal mass on image 111, series 5, consistent with a right adrenal myelolipoma. Soft Tissues/Bones: Mild moderate diffuse degenerative changes throughout the spine. Moderate thoracic dextroscoliosis. 1. No clear evidence for pulmonary embolus within the limitations of this study. 2. Opacity in the right posterolateral trachea which may represent mucoid secretion or aspirated material. 3. Respiratory motion and dependent atelectasis within both lungs. Moderate emphysema. 4. Partially visualized AAA measuring 2.9 x 3.1 cm. Follow-up guidelines provided below. 5. Fatty liver. Prior cholecystectomy. 6. Adrenal hyperplasia. Right adrenal myelolipoma measuring up to 1.3 cm. 7. Multiple probable bilateral renal cysts, some which are partially visualized. 8. Coronary artery disease. Atherosclerotic calcification and atheromatous plaque of the aorta. RECOMMENDATIONS: Managing Abdominal Aortic Aneurysms 2.6-2.9 cm: Every 5 years* 3.0-3.4 cm: Every 3 years. 3.5-3.9 cm: Every 1 year. 4.0-4.4 cm: Every 1 year. Recommend vascular consultation. 4.5-5.4 cm: Every 6 months. Recommend vascular consultation. Greater than or equal to 5.5 cm: Referral to vascular surgeon. *For abdominal aortas with maximum diameter of 2.6-2.9 cm meeting criteria for AAA (>50% of proximal normal segment). Reference: J Vasc Surg. 2009 Oct;50(4 Suppl):S2-49         Physical Examination:        Physical Exam   Constitutional: He is oriented to person, place, and time. Overweight gentleman sitting in chair comfortable, in no distress, on NC   HENT:   Head: Normocephalic and atraumatic.    Eyes: Right eye exhibits no discharge. Left eye exhibits no discharge. Cardiovascular: Intact distal pulses. Murmur (systolic) heard. Pulmonary/Chest: Effort normal. No respiratory distress. He has no wheezes. He exhibits tenderness (right lower chest from fall). Abdominal: Soft. He exhibits no distension. Musculoskeletal: He exhibits tenderness (lumbar pain). He exhibits no edema. Neurological: He is alert and oriented to person, place, and time. Skin: Skin is warm and dry. He is not diaphoretic. Assessment:        Primary Problem  Sepsis [facility])    Active Hospital Problems    Diagnosis Date Noted    Bacteremia [R78.81]     Thrombocytopenia ([facility] [D69.6] 02/22/2018    Sepsis ([facility] [A41.9] 02/21/2018    Diabetes mellitus type 2, insulin dependent ([facility] [E11.9, Z79.4] 02/21/2018    Hypertension [I10]     Hyperlipidemia [E78.5]     COPD (chronic obstructive pulmonary disease) ([facility] [J44.9]        Plan:         MSSA septicemia 2/2 pneumonia - resolved  - 2 sets BCx + MSSA; third set blood cultures (2/26) NGTD 4 days  - LUIS without evidence of vegetations  - PICC placement for IV rocephin til 3/10 per ID recs  - awaiting precert    Acute on chronic hypoxic & hypercapneic respiratory failure, resolving  - COPD on 3L home O2  - cont rocephin    HTN - stable  - decreased lisinopril to 10 mg    DM II - hyperglycemic  - A1C 9  - inc Lantus 40 QHS  - high dose ISS  - Hypoglycemia protocol; POCT glucose x4    Thrombocytopenia, chronic - stable  - CTM platelets    S/p fall x2  - Patient lightheaded, per family poor fluid intake  - No LOC, no head injury   - PT/OT    BPH  - cont Flomax, Proscar.     Roselyn Ravi MD  3/3/2018  9:19 AM

## 2018-03-04 LAB
CULTURE: NORMAL
CULTURE: NORMAL
GLUCOSE BLD-MCNC: 168 MG/DL (ref 75–110)
GLUCOSE BLD-MCNC: 255 MG/DL (ref 75–110)
GLUCOSE BLD-MCNC: 328 MG/DL (ref 75–110)
GLUCOSE BLD-MCNC: 349 MG/DL (ref 75–110)
Lab: NORMAL
Lab: NORMAL
SPECIMEN DESCRIPTION: NORMAL
SPECIMEN DESCRIPTION: NORMAL
STATUS: NORMAL

## 2018-03-04 PROCEDURE — 6360000002 HC RX W HCPCS: Performed by: INTERNAL MEDICINE

## 2018-03-04 PROCEDURE — 6370000000 HC RX 637 (ALT 250 FOR IP): Performed by: STUDENT IN AN ORGANIZED HEALTH CARE EDUCATION/TRAINING PROGRAM

## 2018-03-04 PROCEDURE — 6370000000 HC RX 637 (ALT 250 FOR IP): Performed by: INTERNAL MEDICINE

## 2018-03-04 PROCEDURE — 6370000000 HC RX 637 (ALT 250 FOR IP): Performed by: RADIOLOGY

## 2018-03-04 PROCEDURE — 97110 THERAPEUTIC EXERCISES: CPT

## 2018-03-04 PROCEDURE — 2580000003 HC RX 258: Performed by: STUDENT IN AN ORGANIZED HEALTH CARE EDUCATION/TRAINING PROGRAM

## 2018-03-04 PROCEDURE — 99232 SBSQ HOSP IP/OBS MODERATE 35: CPT | Performed by: INTERNAL MEDICINE

## 2018-03-04 PROCEDURE — 82947 ASSAY GLUCOSE BLOOD QUANT: CPT

## 2018-03-04 PROCEDURE — 99233 SBSQ HOSP IP/OBS HIGH 50: CPT | Performed by: INTERNAL MEDICINE

## 2018-03-04 PROCEDURE — 2580000003 HC RX 258: Performed by: INTERNAL MEDICINE

## 2018-03-04 PROCEDURE — 94761 N-INVAS EAR/PLS OXIMETRY MLT: CPT

## 2018-03-04 PROCEDURE — 94640 AIRWAY INHALATION TREATMENT: CPT

## 2018-03-04 PROCEDURE — 97116 GAIT TRAINING THERAPY: CPT

## 2018-03-04 PROCEDURE — 1200000000 HC SEMI PRIVATE

## 2018-03-04 PROCEDURE — 6370000000 HC RX 637 (ALT 250 FOR IP): Performed by: NURSE PRACTITIONER

## 2018-03-04 RX ADMIN — METOPROLOL TARTRATE 25 MG: 25 TABLET, FILM COATED ORAL at 22:26

## 2018-03-04 RX ADMIN — LISINOPRIL 10 MG: 10 TABLET ORAL at 07:50

## 2018-03-04 RX ADMIN — FAMOTIDINE 20 MG: 20 TABLET, FILM COATED ORAL at 07:50

## 2018-03-04 RX ADMIN — INSULIN LISPRO 3 UNITS: 100 INJECTION, SOLUTION INTRAVENOUS; SUBCUTANEOUS at 12:28

## 2018-03-04 RX ADMIN — ACETAMINOPHEN 650 MG: 325 TABLET, FILM COATED ORAL at 05:25

## 2018-03-04 RX ADMIN — Medication 10 ML: at 07:51

## 2018-03-04 RX ADMIN — IPRATROPIUM BROMIDE AND ALBUTEROL SULFATE 1 AMPULE: .5; 3 SOLUTION RESPIRATORY (INHALATION) at 11:16

## 2018-03-04 RX ADMIN — IPRATROPIUM BROMIDE AND ALBUTEROL SULFATE 1 AMPULE: .5; 3 SOLUTION RESPIRATORY (INHALATION) at 22:05

## 2018-03-04 RX ADMIN — WATER 2 G: 1 INJECTION INTRAMUSCULAR; INTRAVENOUS; SUBCUTANEOUS at 17:56

## 2018-03-04 RX ADMIN — INSULIN LISPRO 15 UNITS: 100 INJECTION, SOLUTION INTRAVENOUS; SUBCUTANEOUS at 17:55

## 2018-03-04 RX ADMIN — ACETAMINOPHEN 650 MG: 325 TABLET, FILM COATED ORAL at 12:25

## 2018-03-04 RX ADMIN — SIMVASTATIN 20 MG: 20 TABLET, FILM COATED ORAL at 22:26

## 2018-03-04 RX ADMIN — PREDNISONE 10 MG: 10 TABLET ORAL at 07:50

## 2018-03-04 RX ADMIN — INSULIN LISPRO 9 UNITS: 100 INJECTION, SOLUTION INTRAVENOUS; SUBCUTANEOUS at 08:09

## 2018-03-04 RX ADMIN — FUROSEMIDE 40 MG: 10 INJECTION, SOLUTION INTRAVENOUS at 07:50

## 2018-03-04 RX ADMIN — ROPINIROLE HYDROCHLORIDE 2 MG: 2 TABLET, FILM COATED ORAL at 22:26

## 2018-03-04 RX ADMIN — IPRATROPIUM BROMIDE AND ALBUTEROL SULFATE 1 AMPULE: .5; 3 SOLUTION RESPIRATORY (INHALATION) at 15:54

## 2018-03-04 RX ADMIN — INSULIN LISPRO 12 UNITS: 100 INJECTION, SOLUTION INTRAVENOUS; SUBCUTANEOUS at 22:26

## 2018-03-04 RX ADMIN — FAMOTIDINE 20 MG: 20 TABLET, FILM COATED ORAL at 22:26

## 2018-03-04 RX ADMIN — Medication 10 ML: at 17:56

## 2018-03-04 RX ADMIN — IPRATROPIUM BROMIDE AND ALBUTEROL SULFATE 1 AMPULE: .5; 3 SOLUTION RESPIRATORY (INHALATION) at 07:13

## 2018-03-04 RX ADMIN — Medication 10 ML: at 20:00

## 2018-03-04 RX ADMIN — TAMSULOSIN HYDROCHLORIDE 0.4 MG: 0.4 CAPSULE ORAL at 07:50

## 2018-03-04 RX ADMIN — METOPROLOL TARTRATE 25 MG: 25 TABLET, FILM COATED ORAL at 07:50

## 2018-03-04 RX ADMIN — Medication 40 UNITS: at 22:27

## 2018-03-04 RX ADMIN — FINASTERIDE 5 MG: 5 TABLET, FILM COATED ORAL at 07:50

## 2018-03-04 ASSESSMENT — PAIN SCALES - GENERAL
PAINLEVEL_OUTOF10: 7
PAINLEVEL_OUTOF10: 5
PAINLEVEL_OUTOF10: 10
PAINLEVEL_OUTOF10: 10

## 2018-03-04 NOTE — PROGRESS NOTES
Phosphorus:No results for input(s): PHOS in the last 72 hours. S. Glucose:  Recent Labs      03/03/18   2102  03/04/18   0805  03/04/18   1118   POCGLU  227*  255*  168*     Glycosylated hemoglobin A1C:   Recent Labs      03/02/18   0523   LABA1C  9.0*     INR: No results for input(s): INR in the last 72 hours. Hepatic functions: No results for input(s): ALKPHOS, ALT, AST, PROT, BILITOT, BILIDIR, LABALBU in the last 72 hours. Pancreatic functions:No results for input(s): LACTA, AMYLASE in the last 72 hours. S. Lactic Acid: No results for input(s): LACTA in the last 72 hours. Cardiac enzymes:No results for input(s): CKTOTAL, CKMB, CKMBINDEX, TROPONINI in the last 72 hours. BNP:No results for input(s): BNP in the last 72 hours. Lipid profile: No results for input(s): CHOL, TRIG, HDL, LDLCALC in the last 72 hours. Invalid input(s): LDL  Blood Gases: No results found for: PH, PCO2, PO2, HCO3, O2SAT  Thyroid functions: No results found for: TSH     Imaging/Diagonstics:  EKG: Normal sinus rhythm    CXR: No acute cardiopulmonary findings. ASSESSMENT:    Patient Active Problem List   Diagnosis    [facility]    Diabetes 1.5, managed as type 2 ([facility]    Hyperlipidemia    Hypertension    History of bladder cancer    Tobacco use    Sepsis ([facility]    Diabetes mellitus type 2, insulin dependent ([facility]    Thrombocytopenia ([facility]    Bacteremia       PLAN:  MSSA septicemia secondary to pneumonia significant improvement IV Rocephin until March 10  COPD  Uncontrolled diabetes with A1c 9  Lantus adjusted  Placement tomorrow RBC of      MD WING Fletcher [address], [address] [place] [address].    Phone [phone]   Fax: [phone]  Answering Service: [phone]

## 2018-03-04 NOTE — PLAN OF CARE
Problem: Falls - Risk of  Goal: Absence of falls  Outcome: Ongoing      Problem: OXYGENATION/RESPIRATORY FUNCTION  Goal: Patient will maintain patent airway  Outcome: Ongoing    Goal: Patient will achieve/maintain normal respiratory rate/effort  Respiratory rate and effort will be within normal limits for the patient   Outcome: Ongoing      Problem: Gas Exchange - Impaired:  Goal: Levels of oxygenation will improve  Levels of oxygenation will improve   Outcome: Ongoing      Problem: Infection, Septic Shock:  Goal: Will show no infection signs and symptoms  Will show no infection signs and symptoms   Outcome: Ongoing      Problem: Pain:  Goal: Control of acute pain  Control of acute pain   Outcome: Ongoing      Problem: Musculor/Skeletal Functional Status  Goal: Highest potential functional level  Outcome: Ongoing    Goal: Absence of falls  Outcome: Ongoing      Problem: Musculor/Skeletal Functional Status  Goal: Highest potential functional level  Outcome: Ongoing      Problem: Nutrition  Goal: Optimal nutrition therapy  Outcome: Ongoing      Problem: Skin Integrity:  Goal: Will show no infection signs and symptoms  Will show no infection signs and symptoms   Outcome: Ongoing    Goal: Absence of new skin breakdown  Absence of new skin breakdown   Outcome: Ongoing

## 2018-03-05 LAB
ABSOLUTE EOS #: 0.1 K/UL (ref 0–0.4)
ABSOLUTE IMMATURE GRANULOCYTE: ABNORMAL K/UL (ref 0–0.3)
ABSOLUTE LYMPH #: 2.1 K/UL (ref 1–4.8)
ABSOLUTE MONO #: 0.9 K/UL (ref 0.1–1.3)
ANION GAP SERPL CALCULATED.3IONS-SCNC: 11 MMOL/L (ref 9–17)
BASOPHILS # BLD: 0 % (ref 0–2)
BASOPHILS ABSOLUTE: 0 K/UL (ref 0–0.2)
BUN BLDV-MCNC: 23 MG/DL (ref 8–23)
BUN/CREAT BLD: ABNORMAL (ref 9–20)
CALCIUM SERPL-MCNC: 9.3 MG/DL (ref 8.6–10.4)
CHLORIDE BLD-SCNC: 96 MMOL/L (ref 98–107)
CO2: 32 MMOL/L (ref 20–31)
CREAT SERPL-MCNC: 0.8 MG/DL (ref 0.7–1.2)
CULTURE: NORMAL
DIFFERENTIAL TYPE: ABNORMAL
DIRECT EXAM: NORMAL
DIRECT EXAM: NORMAL
EOSINOPHILS RELATIVE PERCENT: 1 % (ref 0–4)
GFR AFRICAN AMERICAN: >60 ML/MIN
GFR NON-AFRICAN AMERICAN: >60 ML/MIN
GFR SERPL CREATININE-BSD FRML MDRD: ABNORMAL ML/MIN/{1.73_M2}
GFR SERPL CREATININE-BSD FRML MDRD: ABNORMAL ML/MIN/{1.73_M2}
GLUCOSE BLD-MCNC: 102 MG/DL (ref 70–99)
GLUCOSE BLD-MCNC: 301 MG/DL (ref 75–110)
GLUCOSE BLD-MCNC: 312 MG/DL (ref 75–110)
GLUCOSE BLD-MCNC: 318 MG/DL (ref 75–110)
GLUCOSE BLD-MCNC: 95 MG/DL (ref 75–110)
HCT VFR BLD CALC: 40.5 % (ref 41–53)
HEMOGLOBIN: 13.5 G/DL (ref 13.5–17.5)
IMMATURE GRANULOCYTES: ABNORMAL %
LYMPHOCYTES # BLD: 17 % (ref 24–44)
Lab: NORMAL
MCH RBC QN AUTO: 33.6 PG (ref 26–34)
MCHC RBC AUTO-ENTMCNC: 33.3 G/DL (ref 31–37)
MCV RBC AUTO: 100.8 FL (ref 80–100)
MONOCYTES # BLD: 7 % (ref 1–7)
NRBC AUTOMATED: ABNORMAL PER 100 WBC
PDW BLD-RTO: 13.3 % (ref 11.5–14.9)
PLATELET # BLD: 134 K/UL (ref 150–450)
PLATELET ESTIMATE: ABNORMAL
PMV BLD AUTO: 8.8 FL (ref 6–12)
POTASSIUM SERPL-SCNC: 4.3 MMOL/L (ref 3.7–5.3)
RBC # BLD: 4.01 M/UL (ref 4.5–5.9)
RBC # BLD: ABNORMAL 10*6/UL
SEG NEUTROPHILS: 75 % (ref 36–66)
SEGMENTED NEUTROPHILS ABSOLUTE COUNT: 9.1 K/UL (ref 1.3–9.1)
SODIUM BLD-SCNC: 139 MMOL/L (ref 135–144)
SPECIMEN DESCRIPTION: NORMAL
SPECIMEN DESCRIPTION: NORMAL
STATUS: NORMAL
WBC # BLD: 12.2 K/UL (ref 3.5–11)
WBC # BLD: ABNORMAL 10*3/UL

## 2018-03-05 PROCEDURE — 1200000000 HC SEMI PRIVATE

## 2018-03-05 PROCEDURE — 99232 SBSQ HOSP IP/OBS MODERATE 35: CPT | Performed by: INTERNAL MEDICINE

## 2018-03-05 PROCEDURE — 80048 BASIC METABOLIC PNL TOTAL CA: CPT

## 2018-03-05 PROCEDURE — 6370000000 HC RX 637 (ALT 250 FOR IP): Performed by: INTERNAL MEDICINE

## 2018-03-05 PROCEDURE — 6370000000 HC RX 637 (ALT 250 FOR IP): Performed by: NURSE PRACTITIONER

## 2018-03-05 PROCEDURE — 97110 THERAPEUTIC EXERCISES: CPT

## 2018-03-05 PROCEDURE — 2580000003 HC RX 258: Performed by: INTERNAL MEDICINE

## 2018-03-05 PROCEDURE — 6370000000 HC RX 637 (ALT 250 FOR IP): Performed by: RADIOLOGY

## 2018-03-05 PROCEDURE — 97116 GAIT TRAINING THERAPY: CPT

## 2018-03-05 PROCEDURE — 2580000003 HC RX 258: Performed by: STUDENT IN AN ORGANIZED HEALTH CARE EDUCATION/TRAINING PROGRAM

## 2018-03-05 PROCEDURE — 6370000000 HC RX 637 (ALT 250 FOR IP): Performed by: STUDENT IN AN ORGANIZED HEALTH CARE EDUCATION/TRAINING PROGRAM

## 2018-03-05 PROCEDURE — 94640 AIRWAY INHALATION TREATMENT: CPT

## 2018-03-05 PROCEDURE — 94761 N-INVAS EAR/PLS OXIMETRY MLT: CPT

## 2018-03-05 PROCEDURE — 85025 COMPLETE CBC W/AUTO DIFF WBC: CPT

## 2018-03-05 PROCEDURE — 82947 ASSAY GLUCOSE BLOOD QUANT: CPT

## 2018-03-05 PROCEDURE — 97535 SELF CARE MNGMENT TRAINING: CPT

## 2018-03-05 PROCEDURE — 97530 THERAPEUTIC ACTIVITIES: CPT

## 2018-03-05 PROCEDURE — 6360000002 HC RX W HCPCS: Performed by: INTERNAL MEDICINE

## 2018-03-05 PROCEDURE — 36415 COLL VENOUS BLD VENIPUNCTURE: CPT

## 2018-03-05 RX ORDER — PEN NEEDLE, DIABETIC 31 G X1/4"
1 NEEDLE, DISPOSABLE MISCELLANEOUS DAILY
Qty: 100 EACH | Refills: 3 | Status: SHIPPED | OUTPATIENT
Start: 2018-03-05

## 2018-03-05 RX ORDER — ACETAMINOPHEN 325 MG/1
650 TABLET ORAL EVERY 6 HOURS PRN
Qty: 120 TABLET | Refills: 3 | DISCHARGE
Start: 2018-03-05 | End: 2018-08-03 | Stop reason: ALTCHOICE

## 2018-03-05 RX ADMIN — INSULIN LISPRO 12 UNITS: 100 INJECTION, SOLUTION INTRAVENOUS; SUBCUTANEOUS at 11:45

## 2018-03-05 RX ADMIN — METOPROLOL TARTRATE 25 MG: 25 TABLET, FILM COATED ORAL at 21:12

## 2018-03-05 RX ADMIN — WATER 2 G: 1 INJECTION INTRAMUSCULAR; INTRAVENOUS; SUBCUTANEOUS at 18:24

## 2018-03-05 RX ADMIN — ACETAMINOPHEN 650 MG: 325 TABLET, FILM COATED ORAL at 01:33

## 2018-03-05 RX ADMIN — FINASTERIDE 5 MG: 5 TABLET, FILM COATED ORAL at 08:46

## 2018-03-05 RX ADMIN — IPRATROPIUM BROMIDE AND ALBUTEROL SULFATE 1 AMPULE: .5; 3 SOLUTION RESPIRATORY (INHALATION) at 10:45

## 2018-03-05 RX ADMIN — LISINOPRIL 10 MG: 10 TABLET ORAL at 08:46

## 2018-03-05 RX ADMIN — FAMOTIDINE 20 MG: 20 TABLET, FILM COATED ORAL at 08:45

## 2018-03-05 RX ADMIN — IPRATROPIUM BROMIDE AND ALBUTEROL SULFATE 1 AMPULE: .5; 3 SOLUTION RESPIRATORY (INHALATION) at 20:46

## 2018-03-05 RX ADMIN — SIMVASTATIN 20 MG: 20 TABLET, FILM COATED ORAL at 21:12

## 2018-03-05 RX ADMIN — IPRATROPIUM BROMIDE AND ALBUTEROL SULFATE 1 AMPULE: .5; 3 SOLUTION RESPIRATORY (INHALATION) at 15:08

## 2018-03-05 RX ADMIN — ACETAMINOPHEN 650 MG: 325 TABLET, FILM COATED ORAL at 23:53

## 2018-03-05 RX ADMIN — INSULIN LISPRO 12 UNITS: 100 INJECTION, SOLUTION INTRAVENOUS; SUBCUTANEOUS at 16:25

## 2018-03-05 RX ADMIN — IPRATROPIUM BROMIDE AND ALBUTEROL SULFATE 1 AMPULE: .5; 3 SOLUTION RESPIRATORY (INHALATION) at 07:26

## 2018-03-05 RX ADMIN — Medication 10 ML: at 21:13

## 2018-03-05 RX ADMIN — FAMOTIDINE 20 MG: 20 TABLET, FILM COATED ORAL at 21:12

## 2018-03-05 RX ADMIN — TAMSULOSIN HYDROCHLORIDE 0.4 MG: 0.4 CAPSULE ORAL at 08:46

## 2018-03-05 RX ADMIN — METOPROLOL TARTRATE 25 MG: 25 TABLET, FILM COATED ORAL at 08:46

## 2018-03-05 RX ADMIN — Medication 40 UNITS: at 21:12

## 2018-03-05 RX ADMIN — PREDNISONE 10 MG: 10 TABLET ORAL at 08:46

## 2018-03-05 RX ADMIN — ROPINIROLE HYDROCHLORIDE 2 MG: 2 TABLET, FILM COATED ORAL at 21:12

## 2018-03-05 RX ADMIN — ACETAMINOPHEN 650 MG: 325 TABLET, FILM COATED ORAL at 09:23

## 2018-03-05 RX ADMIN — INSULIN LISPRO 12 UNITS: 100 INJECTION, SOLUTION INTRAVENOUS; SUBCUTANEOUS at 21:12

## 2018-03-05 RX ADMIN — Medication 10 ML: at 08:49

## 2018-03-05 ASSESSMENT — PAIN SCALES - GENERAL
PAINLEVEL_OUTOF10: 7
PAINLEVEL_OUTOF10: 5
PAINLEVEL_OUTOF10: 6
PAINLEVEL_OUTOF10: 5
PAINLEVEL_OUTOF10: 3
PAINLEVEL_OUTOF10: 0
PAINLEVEL_OUTOF10: 8
PAINLEVEL_OUTOF10: 6

## 2018-03-05 ASSESSMENT — PAIN DESCRIPTION - FREQUENCY: FREQUENCY: INTERMITTENT

## 2018-03-05 ASSESSMENT — PAIN DESCRIPTION - PROGRESSION: CLINICAL_PROGRESSION: NOT CHANGED

## 2018-03-05 ASSESSMENT — PAIN DESCRIPTION - ORIENTATION
ORIENTATION: MID;UPPER
ORIENTATION: MID;UPPER

## 2018-03-05 ASSESSMENT — PAIN DESCRIPTION - LOCATION
LOCATION: BACK
LOCATION: BACK

## 2018-03-05 ASSESSMENT — PAIN DESCRIPTION - PAIN TYPE
TYPE: ACUTE PAIN
TYPE: ACUTE PAIN

## 2018-03-05 ASSESSMENT — PAIN DESCRIPTION - DESCRIPTORS: DESCRIPTORS: ACHING

## 2018-03-05 NOTE — PROGRESS NOTES
Nutrition Assessment    Type and Reason for Visit: Reassess    Nutrition Recommendations: Continue Carb control diet 5 carbs/meal and dental soft diet. Discontinue Glucerna 1x/day. Malnutrition Assessment:  · Malnutrition Status: Mild Malnutrition  · Context: Acute illness or injury  · Findings of the 6 clinical characteristics of malnutrition (Minimum of 2 out of 6 clinical characteristics is required to make the diagnosis of moderate or severe Protein Calorie Malnutrition based on AND/ASPEN Guidelines):  1. Energy Intake-Less than or equal to 50%, greater than 7 days    2. Weight Loss-5% loss or greater, in 1 week  3. Fat Loss-Mild subcutaneous fat loss, Orbital, Triceps  4. Muscle Loss-Mild muscle mass loss, Temples (temporalis muscle)  5. Fluid Accumulation-Mild fluid accumulation, Extremities  6.  Strength-Not measured    Nutrition Diagnosis:   · Problem: Inadequate oral intake  · Etiology: related to Insufficient energy/nutrient consumption     Signs and symptoms:  as evidenced by Weight loss, Diet history of poor intake    Nutrition Assessment:  · Subjective Assessment: Patient reports he has a good appetite and is eating \"too good\". Dental soft diet is tolerated well. Patient and wife reports patient is not drinking Glucerna nutrition supplement. Wife agreed that [address] can be discontinued since he is now eating much better.    · Nutrition-Focused Physical Findings: +1 pitting BLE edema  · Wound Type: None  · Current Nutrition Therapies:  · Oral Diet Orders: Carb Control 5 Carbs/Meal, Dental Soft   · Oral Diet intake: %  · Oral Nutrition Supplement (ONS) Orders: Diabetic Oral Supplement  · ONS intake: 0%, 1-25%  · Anthropometric Measures:  · Ht: 5' 7\" (170.2 cm)   · Current Body Wt: 191 lb 5.8 oz (86.8 kg)  · Admission Body Wt: 200 lb 9.9 oz (91 kg)  · % Weight Change: 5.7%,  9 days  · Ideal Body Wt: 148 lb (67.1 kg), % Ideal Body 129%  · BMI Classification: BMI 30.0 - 34.9 Obese Class

## 2018-03-05 NOTE — PLAN OF CARE
Problem: Nutrition  Goal: Optimal nutrition therapy  Outcome: Ongoing  Nutrition Problem: Inadequate oral intake  Intervention: Food and/or Nutrient Delivery: Continue current diet, Discontinue ONS  Nutritional Goals: adequate nutriton provision

## 2018-03-05 NOTE — PROGRESS NOTES
[facility]   INPATIENT OCCUPATIONAL THERAPY  PROGRESS NOTE  Date: 3/5/2018  Patient Name: Guillaume Jade      Room:   MRN: [id number]    : 1940  ([de-identified] y.o.) Gender: male      Diagnosis: pt admit 2-21 with a fall, CT head, neck and pelvis negative, septicemia 2/2 pneumonia vs UTI,sepsis, COPD, CT L elbow to r/o septic arthritis; PMHx of 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, plan for LUIS  General  Patient assessed for rehabilitation services?: Yes  Additional Pertinent Hx: pt has had 2 more falls this hospital stay  Family / Caregiver Present: Yes (son present and supportive, has his own health problems)  Diagnosis: pt admit 2-21 with a fall, CT head, neck and pelvis negative, septicemia 2/2 pneumonia vs UTI,sepsis, COPD, CT L elbow to r/o septic arthritis; PMHx of 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, plan for LUIS    Restrictions  Restrictions/Precautions: Cardiac  Other position/activity restrictions: on nc oxygen  Position Activity Restriction  Other position/activity restrictions: on nc oxygen       Subjective  Subjective: pt states that his back is bothering him this AM.  Comments: pt alert and sitting up in chair as writer arrives  Patient Currently in Pain: Yes  Pain Level: 6  Pain Location: Back  Pain Orientation: Mid;Upper          Objective        Balance  Sitting Balance: Supervision  Standing Balance: Stand by assistance  Standing Balance  Time: ~5 minutes, ~3 minutes c 1-2 UE support on RW  Activity: reaching activity, reaching outside of SHERRI, high/low to increase I and safety during ADLs and transfers, room mobility  Sit to stand: Stand by assistance  Stand to sit: Stand by assistance  Comment: pt tolerated standing activity well, no LOB noted  Functional Mobility  Functional - Mobility Device: Rolling Walker  Activity:  (room mobility)  Assist Level: Contact guard assistance  Functional Mobility Comments: no LOB noted, VCs to stay within RW c P return     ADL  Toileting: Stand by assistance (no LOB noted)  Additional Comments: pt states that he washed up earlier     Transfers  Sit to stand: Stand by assistance  Stand to sit: Stand by assistance  Toilet Transfers  Toilet - Technique: Ambulating  Equipment Used: Grab bars  Toilet Transfer: Stand by assistance  Toilet Transfers Comments: VCs for hand placement c G return  Upper Extremity Function  UE Strengthing: BUE, HEP c yellow theraband in all planes x 15 reps to support mobilityand daily activies (SATS stays above 93% throughout exercises)          Assessment  Performance deficits / Impairments: Decreased functional mobility ; Decreased ADL status; Decreased safe awareness;Decreased cognition;Decreased endurance;Decreased balance  Prognosis: Good  Discharge Recommendations: Subacute/Skilled Nursing Facility  Activity Tolerance: Patient limited by fatigue  Activity Tolerance: pt desats seated EOB with moving UE for AROM test, etc, with rests, increases to 90 or better   Safety Devices in place: Yes  Type of devices: Call light within reach; All fall risk precautions in place;Nurse notified (pt left on toilet, Kiersten Dorsey RN notified)             Patient Education:  OT POC, review HEP, home safety, RW safety during transfers and functional mobility  Learner:patient  Method: demonstration and explanation       Outcome: acknowledged understanding and demonstrated understanding     Plan  Safety Devices  Safety Devices in place: Yes  Type of devices: Call light within reach, All fall risk precautions in place, Nurse notified (pt left on toiletKiersten RN notified)  Plan  Times per week: 4-5  Times per day: Daily  Current Treatment Recommendations: Balance Training, Functional Mobility Training, Endurance Training, Self-Care / ADL, Patient/Caregiver Education & Training, Safety Education & Training, Cognitive/Perceptual Training      Goals  Short term goals  Time Frame for Short term goals: 1 week  Short term goal 1: set up UE bathe and dress  Short term goal 2: mod LE

## 2018-03-05 NOTE — PLAN OF CARE
Problem: Falls - Risk of  Goal: Absence of falls  Outcome: Ongoing  Pt has been free from falls this shift. Bed is in lowest position, brake is locked, call light is within reach. Will continue to monitor. Problem: Pain:  Goal: Control of acute pain  Control of acute pain   Outcome: Ongoing  Patient has chronic low back pain. Tylenol is helping.

## 2018-03-05 NOTE — PROGRESS NOTES
Pulmonary Progress Note  [facility]      Patient - Ayaka Moody,  Age - [de-identified] y.o.    - 1940      Room Number - 7/7-01   MRN -  [id number]   Acct # - [de-identified]  Date of Admission -  2018  2:48 PM        Consulting [address] [place] [address],[address], [place]  Primary Care Physician - Stephanie Durham, DO     SUBJECTIVE   No distress   wanting    OBJECTIVE   VITALS    height is 5' 7\" (1.702 m) and weight is 191 lb 2.2 oz (86.7 kg). His axillary temperature is 97.5 °F (36.4 °C). His blood pressure is 125/67 and his pulse is 74. His respiration is 18 and oxygen saturation is 95%. Body mass index is 29.94 kg/m². Temperature Range: Temp: 97.5 °F (36.4 °C) Temp  Av.4 °F (36.3 °C)  Min: 97.4 °F (36.3 °C)  Max: 97.5 °F (36.4 °C)  BP Range:  Systolic (06NFX), OGC:907 , Min:106 , OGF:485     Diastolic (89SPY), AIM:49, Min:67, Max:74    Pulse Range: Pulse  Av  Min: 74  Max: 99  Respiration Range: Resp  Av  Min: 18  Max: 18  Current Pulse Ox[de-identified]  SpO2: 95 %  24HR Pulse Ox Range:  SpO2  Av.3 %  Min: 87 %  Max: 96 %  Oxygen Amount and Delivery: O2 Flow Rate (L/min): 2.5 L/min    Wt Readings from Last 3 Encounters:   18 191 lb 2.2 oz (86.7 kg)   17 194 lb 9.6 oz (88.3 kg)   17 180 lb (81.6 kg)       I/O (24 Hours)    Intake/Output Summary (Last 24 hours) at 18 1056  Last data filed at 18 2349   Gross per 24 hour   Intake                0 ml   Output              125 ml   Net             -125 ml       EXAM     General Appearance  Awake, alert, oriented, in no acute distress  HEENT - normocephalic, atraumatic. Neck - Supple,  trachea midline   Lungs - no distress  Heart Exam:PMI normal. No lifts, heaves, or thrills. RRR. No murmurs, clicks, gallops, or rubs  Abdomen Exam: Abdomen soft, non-tender.  BS normal.   Extremity Exam: no edema    MEDS      insulin glargine  40 Units Subcutaneous Nightly    lisinopril  10 mg

## 2018-03-05 NOTE — PLAN OF CARE
Problem: Falls - Risk of  Goal: Absence of falls  Outcome: Ongoing  Call light within reach, bed in lowest position, and hourly rounding performed. Bed alarm, along with pull alarm, utilized.   Pt remains free from falls    Problem: Infection, Septic Shock:  Goal: Will show no infection signs and symptoms  Will show no infection signs and symptoms   Outcome: Ongoing  No new signs of infection noted    Problem: Skin Integrity:  Goal: Absence of new skin breakdown  Absence of new skin breakdown   Outcome: Ongoing  No further breakdown noted

## 2018-03-06 LAB
ABSOLUTE EOS #: 0 K/UL (ref 0–0.4)
ABSOLUTE IMMATURE GRANULOCYTE: ABNORMAL K/UL (ref 0–0.3)
ABSOLUTE LYMPH #: 1.22 K/UL (ref 1–4.8)
ABSOLUTE MONO #: 0.41 K/UL (ref 0.1–1.3)
ANION GAP SERPL CALCULATED.3IONS-SCNC: 11 MMOL/L (ref 9–17)
BASOPHILS # BLD: 0 % (ref 0–2)
BASOPHILS ABSOLUTE: 0 K/UL (ref 0–0.2)
BUN BLDV-MCNC: 21 MG/DL (ref 8–23)
BUN/CREAT BLD: ABNORMAL (ref 9–20)
CALCIUM SERPL-MCNC: 9.6 MG/DL (ref 8.6–10.4)
CHLORIDE BLD-SCNC: 94 MMOL/L (ref 98–107)
CO2: 33 MMOL/L (ref 20–31)
CREAT SERPL-MCNC: 0.83 MG/DL (ref 0.7–1.2)
DIFFERENTIAL TYPE: ABNORMAL
EOSINOPHILS RELATIVE PERCENT: 0 % (ref 0–4)
GFR AFRICAN AMERICAN: >60 ML/MIN
GFR NON-AFRICAN AMERICAN: >60 ML/MIN
GFR SERPL CREATININE-BSD FRML MDRD: ABNORMAL ML/MIN/{1.73_M2}
GFR SERPL CREATININE-BSD FRML MDRD: ABNORMAL ML/MIN/{1.73_M2}
GLUCOSE BLD-MCNC: 217 MG/DL (ref 75–110)
GLUCOSE BLD-MCNC: 223 MG/DL (ref 75–110)
GLUCOSE BLD-MCNC: 229 MG/DL (ref 70–99)
GLUCOSE BLD-MCNC: 265 MG/DL (ref 75–110)
GLUCOSE BLD-MCNC: 270 MG/DL (ref 75–110)
HCT VFR BLD CALC: 41.4 % (ref 41–53)
HEMOGLOBIN: 14.1 G/DL (ref 13.5–17.5)
IMMATURE GRANULOCYTES: ABNORMAL %
LYMPHOCYTES # BLD: 12 % (ref 24–44)
MCH RBC QN AUTO: 34.3 PG (ref 26–34)
MCHC RBC AUTO-ENTMCNC: 34 G/DL (ref 31–37)
MCV RBC AUTO: 100.8 FL (ref 80–100)
MONOCYTES # BLD: 4 % (ref 1–7)
MORPHOLOGY: NORMAL
NRBC AUTOMATED: ABNORMAL PER 100 WBC
PDW BLD-RTO: 13.7 % (ref 11.5–14.9)
PLATELET # BLD: 139 K/UL (ref 150–450)
PLATELET ESTIMATE: ABNORMAL
PMV BLD AUTO: 8.8 FL (ref 6–12)
POTASSIUM SERPL-SCNC: 4.7 MMOL/L (ref 3.7–5.3)
RBC # BLD: 4.11 M/UL (ref 4.5–5.9)
RBC # BLD: ABNORMAL 10*6/UL
SEG NEUTROPHILS: 84 % (ref 36–66)
SEGMENTED NEUTROPHILS ABSOLUTE COUNT: 8.57 K/UL (ref 1.3–9.1)
SODIUM BLD-SCNC: 138 MMOL/L (ref 135–144)
WBC # BLD: 10.2 K/UL (ref 3.5–11)
WBC # BLD: ABNORMAL 10*3/UL

## 2018-03-06 PROCEDURE — 6360000002 HC RX W HCPCS: Performed by: STUDENT IN AN ORGANIZED HEALTH CARE EDUCATION/TRAINING PROGRAM

## 2018-03-06 PROCEDURE — 6370000000 HC RX 637 (ALT 250 FOR IP): Performed by: STUDENT IN AN ORGANIZED HEALTH CARE EDUCATION/TRAINING PROGRAM

## 2018-03-06 PROCEDURE — 1200000000 HC SEMI PRIVATE

## 2018-03-06 PROCEDURE — 6370000000 HC RX 637 (ALT 250 FOR IP): Performed by: RADIOLOGY

## 2018-03-06 PROCEDURE — 6370000000 HC RX 637 (ALT 250 FOR IP): Performed by: NURSE PRACTITIONER

## 2018-03-06 PROCEDURE — 94761 N-INVAS EAR/PLS OXIMETRY MLT: CPT

## 2018-03-06 PROCEDURE — 94640 AIRWAY INHALATION TREATMENT: CPT

## 2018-03-06 PROCEDURE — 85025 COMPLETE CBC W/AUTO DIFF WBC: CPT

## 2018-03-06 PROCEDURE — 6360000002 HC RX W HCPCS: Performed by: INTERNAL MEDICINE

## 2018-03-06 PROCEDURE — 36415 COLL VENOUS BLD VENIPUNCTURE: CPT

## 2018-03-06 PROCEDURE — 99233 SBSQ HOSP IP/OBS HIGH 50: CPT | Performed by: INTERNAL MEDICINE

## 2018-03-06 PROCEDURE — 82947 ASSAY GLUCOSE BLOOD QUANT: CPT

## 2018-03-06 PROCEDURE — 80048 BASIC METABOLIC PNL TOTAL CA: CPT

## 2018-03-06 PROCEDURE — 2580000003 HC RX 258: Performed by: STUDENT IN AN ORGANIZED HEALTH CARE EDUCATION/TRAINING PROGRAM

## 2018-03-06 PROCEDURE — 2580000003 HC RX 258: Performed by: INTERNAL MEDICINE

## 2018-03-06 PROCEDURE — 6370000000 HC RX 637 (ALT 250 FOR IP): Performed by: INTERNAL MEDICINE

## 2018-03-06 RX ORDER — IPRATROPIUM BROMIDE AND ALBUTEROL SULFATE 2.5; .5 MG/3ML; MG/3ML
1 SOLUTION RESPIRATORY (INHALATION) 3 TIMES DAILY
Status: DISCONTINUED | OUTPATIENT
Start: 2018-03-06 | End: 2018-03-08

## 2018-03-06 RX ADMIN — INSULIN LISPRO 6 UNITS: 100 INJECTION, SOLUTION INTRAVENOUS; SUBCUTANEOUS at 21:50

## 2018-03-06 RX ADMIN — Medication 10 ML: at 09:41

## 2018-03-06 RX ADMIN — Medication 10 ML: at 22:05

## 2018-03-06 RX ADMIN — IPRATROPIUM BROMIDE AND ALBUTEROL SULFATE 1 AMPULE: .5; 3 SOLUTION RESPIRATORY (INHALATION) at 07:00

## 2018-03-06 RX ADMIN — SIMVASTATIN 20 MG: 20 TABLET, FILM COATED ORAL at 21:50

## 2018-03-06 RX ADMIN — LISINOPRIL 10 MG: 10 TABLET ORAL at 09:42

## 2018-03-06 RX ADMIN — METOPROLOL TARTRATE 25 MG: 25 TABLET, FILM COATED ORAL at 09:42

## 2018-03-06 RX ADMIN — METOPROLOL TARTRATE 25 MG: 25 TABLET, FILM COATED ORAL at 21:49

## 2018-03-06 RX ADMIN — IPRATROPIUM BROMIDE AND ALBUTEROL SULFATE 1 AMPULE: .5; 3 SOLUTION RESPIRATORY (INHALATION) at 19:18

## 2018-03-06 RX ADMIN — IPRATROPIUM BROMIDE AND ALBUTEROL SULFATE 1 AMPULE: .5; 3 SOLUTION RESPIRATORY (INHALATION) at 13:11

## 2018-03-06 RX ADMIN — INSULIN LISPRO 9 UNITS: 100 INJECTION, SOLUTION INTRAVENOUS; SUBCUTANEOUS at 11:49

## 2018-03-06 RX ADMIN — ACETAMINOPHEN 650 MG: 325 TABLET, FILM COATED ORAL at 09:39

## 2018-03-06 RX ADMIN — TAMSULOSIN HYDROCHLORIDE 0.4 MG: 0.4 CAPSULE ORAL at 09:32

## 2018-03-06 RX ADMIN — ENOXAPARIN SODIUM 40 MG: 40 INJECTION SUBCUTANEOUS at 09:33

## 2018-03-06 RX ADMIN — Medication 40 UNITS: at 21:50

## 2018-03-06 RX ADMIN — FAMOTIDINE 20 MG: 20 TABLET, FILM COATED ORAL at 09:32

## 2018-03-06 RX ADMIN — FAMOTIDINE 20 MG: 20 TABLET, FILM COATED ORAL at 21:50

## 2018-03-06 RX ADMIN — FINASTERIDE 5 MG: 5 TABLET, FILM COATED ORAL at 09:33

## 2018-03-06 RX ADMIN — WATER 2 G: 1 INJECTION INTRAMUSCULAR; INTRAVENOUS; SUBCUTANEOUS at 17:32

## 2018-03-06 RX ADMIN — INSULIN LISPRO 9 UNITS: 100 INJECTION, SOLUTION INTRAVENOUS; SUBCUTANEOUS at 17:31

## 2018-03-06 RX ADMIN — ROPINIROLE HYDROCHLORIDE 2 MG: 2 TABLET, FILM COATED ORAL at 21:55

## 2018-03-06 ASSESSMENT — PAIN DESCRIPTION - LOCATION: LOCATION: BACK

## 2018-03-06 ASSESSMENT — PAIN DESCRIPTION - PAIN TYPE: TYPE: ACUTE PAIN

## 2018-03-06 ASSESSMENT — PAIN SCALES - GENERAL
PAINLEVEL_OUTOF10: 0
PAINLEVEL_OUTOF10: 3
PAINLEVEL_OUTOF10: 10

## 2018-03-06 ASSESSMENT — PAIN DESCRIPTION - ORIENTATION: ORIENTATION: RIGHT;UPPER

## 2018-03-06 NOTE — PROGRESS NOTES
Pulmonary Progress Note  [facility]      Patient - Justine Davis,  Age - [de-identified] y.o.    - 1940      Room Number - 2067/2067-01   N -  [id number]   Acct # - [de-identified]  Date of Admission -  2018  2:48 PM        Consulting [address] [place] [address],[address], MD  Primary Care Physician - Sameer Perez, DO     SUBJECTIVE   No distress  confused    OBJECTIVE   VITALS    height is 5' 7\" (1.702 m) and weight is 191 lb 2.2 oz (86.7 kg). His oral temperature is 98.1 °F (36.7 °C). His blood pressure is 105/71 and his pulse is 84. His respiration is 18 and oxygen saturation is 91%. Body mass index is 29.94 kg/m². Temperature Range: Temp: 98.1 °F (36.7 °C) Temp  Av.8 °F (36.6 °C)  Min: 97.7 °F (36.5 °C)  Max: 98.1 °F (36.7 °C)  BP Range:  Systolic (12IWS), EWM:193 , Min:103 , XGL:077     Diastolic (70RXV), WOZ:29, Min:65, Max:71    Pulse Range: Pulse  Av.8  Min: 84  Max: 92  Respiration Range: Resp  Av.9  Min: 16  Max: 18  Current Pulse Ox[de-identified]  SpO2: 91 %  24HR Pulse Ox Range:  SpO2  Av %  Min: 91 %  Max: 95 %  Oxygen Amount and Delivery: O2 Flow Rate (L/min): 2.5 L/min    Wt Readings from Last 3 Encounters:   18 191 lb 2.2 oz (86.7 kg)   17 194 lb 9.6 oz (88.3 kg)   17 180 lb (81.6 kg)       I/O (24 Hours)    Intake/Output Summary (Last 24 hours) at 18 1646  Last data filed at 18 0407   Gross per 24 hour   Intake                0 ml   Output              450 ml   Net             -450 ml       EXAM     General Appearance  Awake, alert, oriented, in no acute distress  HEENT - normocephalic, atraumatic. Neck - Supple,  trachea midline   Lungs - coarse  Heart Exam:PMI normal. No lifts, heaves, or thrills. RRR. No murmurs, clicks, gallops, or rubs  Abdomen Exam: Abdomen soft, non-tender.  BS normal.  Extremity Exam: no edema    MEDS      ipratropium-albuterol  1 ampule Inhalation TID    insulin glargine  40 Units Subcutaneous Nightly    lisinopril  10 mg Oral Daily    insulin lispro  0-18 Units Subcutaneous 4x Daily AC & HS    rOPINIRole  2 mg Oral Nightly    metoprolol tartrate  25 mg Oral BID    famotidine  20 mg Oral BID    cefTRIAXone (ROCEPHIN) IV  2 g Intravenous Q24H    tamsulosin  0.4 mg Oral Daily    sodium chloride flush  10 mL Intravenous 2 times per day    enoxaparin  40 mg Subcutaneous Daily    finasteride  5 mg Oral Daily    simvastatin  20 mg Oral Nightly      dextrose       melatonin ER, hydrALAZINE, ondansetron, albuterol sulfate HFA, sodium chloride flush, acetaminophen, sodium phosphate IVPB **OR** sodium phosphate IVPB, potassium chloride **OR** potassium chloride **OR** potassium chloride, magnesium sulfate, glucose, dextrose, glucagon (rDNA), dextrose, albuterol    LABS   CBC   Recent Labs      03/06/18   0554   WBC  10.2   HGB  14.1   HCT  41.4   MCV  100.8*   PLT  139*     BMP: Lab Results   Component Value Date     03/06/2018    K 4.7 03/06/2018    CL 94 03/06/2018    CO2 33 03/06/2018    BUN 21 03/06/2018    LABALBU 3.6 02/21/2018    CREATININE 0.83 03/06/2018    CALCIUM 9.6 03/06/2018    GFRAA >60 03/06/2018    LABGLOM >60 03/06/2018     ABGs:  Lab Results   Component Value Date    PHART 7.438 02/26/2018    PO2ART 72.5 02/26/2018    VNE6LSV 43.3 02/26/2018    Lab Results   Component Value Date    MODE CPAP 02/26/2018     Ionized Calcium:  No results found for: IONCA  Magnesium:    Lab Results   Component Value Date    MG 2.5 02/26/2018     Phosphorus:    Lab Results   Component Value Date    PHOS 2.1 02/26/2018        LIVER PROFILE No results for input(s): AST, ALT, LIPASE, BILIDIR, BILITOT, ALKPHOS in the last 72 hours. Invalid input(s): AMYLASE,  ALB  INR No results for input(s): INR in the last 72 hours.   PTT   Lab Results   Component Value Date    APTT 30.3 02/23/2018         RADIOLOGY     (See actual reports for details)    ASSESSMENT/PLAN     Patient Active Problem List Diagnosis    COPD (chronic obstructive pulmonary disease) (HCC)    Diabetes 1.5, managed as type 2 ([facility]    Hyperlipidemia    Hypertension    History of bladder cancer    Tobacco use    Sepsis ([facility]    Diabetes mellitus type 2, insulin dependent (HCC)    Thrombocytopenia (HCC)    Bacteremia     From pulm standpoint looks stable.  ECF may be what he needs  Electronically signed by Génesis Majano MD on 3/6/2018 at 4:46 PM

## 2018-03-06 NOTE — DISCHARGE SUMMARY
[facility]    Patient name:  Benita Hilario  YOB: 1940  Primary Care Physician: Sravani Ngo DO    Date of admission:  2/21/2018  2:48 PM  Date of discharge:     DISCHARGE DIAGNOSES       Principal Problem:    Sepsis ([facility]  Active Problems:    COPD (chronic obstructive pulmonary disease) ([facility]    Hyperlipidemia    Hypertension    Diabetes mellitus type 2, insulin dependent ([facility]    Thrombocytopenia ([facility]    Bacteremia  Resolved Problems:    * No resolved hospital problems. *      HOSPITAL COURSE      PNEUMONIA   SEPSIS   IMPROVED   IV ATB     COPD   BACK TO BASELINE     no chf   Consultants:  -pulmonary    Procedures:  None    DISCHARGE MEDICATIONS        Ann Tello   Home Medication Instructions [id number]    Printed on:03/05/18 7475   Medication Information                      acetaminophen (TYLENOL) 325 MG tablet  Take 2 tablets by mouth every 6 hours as needed for Pain or Fever             B-D UF III MINI PEN NEEDLES 31G X 5 MM MISC  USE WITH INSULIN 4 TIMES DAILY             cefTRIAXone (ROCEPHIN) 500 MG injection  Infuse 0.5 g intravenously every 24 hours for 8 days             finasteride (PROSCAR) 5 MG tablet  TAKE 1 TABLET BY MOUTH DAILY             fluticasone-salmeterol (ADVAIR) 250-50 MCG/DOSE AEPB  Inhale 1 puff into the lungs every 12 hours.              insulin glargine (LANTUS SOLOSTAR) 100 UNIT/ML injection pen  Inject 40 Units into the skin nightly             Insulin Pen Needle (PEN NEEDLES) 31G X 6 MM MISC  1 each by Does not apply route daily             lovastatin (MEVACOR) 40 MG tablet  TAKE 1 TABLET BY MOUTH NIGHTLY             metoprolol tartrate (LOPRESSOR) 25 MG tablet  Take 1 tablet by mouth 2 times daily             PROAIR  (90 BASE) MCG/ACT inhaler  INHALE 2 PUFFS USING INHALER EVERY 4 HOURS AS NEEDED             quinapril (ACCUPRIL) 40 MG tablet  Take 1
tablet  Take 1 tablet by mouth daily             rOPINIRole (REQUIP) 2 MG tablet  TAKE ONE TABLET BY MOUTH EVERY EVENING AS DIRECTED                 DISPOSITION AND FOLLOW-UP     Disposition:    home with home care   Condition: Stable     Diet:  Regular diet     Activity: As tolerated     Follow-up:   with Della Hinds, DO,    Discharge time spent on pt and paperworki more than [address], [address] [place] [address].    Phone [phone]   Fax: [phone]  Answering Service: [phone]

## 2018-03-06 NOTE — PROGRESS NOTES
[address]   OCCUPATIONAL THERAPY MISSED TREATMENT NOTE   INPATIENT   Date: 3/6/18  Patient Name: Dread Balderrama       Room:   MRN: [id number]   Account #: [de-identified]    : 1940  ([de-identified] y.o.)  Gender: male   Diagnosis: pt admit 2-21 with a fall, CT head, neck and pelvis negative, septicemia 2/2 pneumonia vs UTI,sepsis, COPD, CT L elbow to r/o septic arthritis; PMHx of 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, plan for LUIS             REASON FOR MISSED TREATMENT:  Patient with another ancillary department   -   Other - Pt c PCT for pt care       Orma Gilford, OTA

## 2018-03-06 NOTE — PROGRESS NOTES
Infectious disease Consult Note      Patient: Sharyon Gilford  : 1940  Acct#:  [id number]     Date:  3/6/2018    Subjective:       History of Present Illness  Patient is a [de-identified] y.o.  male admitted with Sepsis ([facility] [A41.9] who is seen in consult for the same . The patient was brought to the ER after a fall at the bathroom s ,body ache ,fever and chills ,chronic cough and SOB, not feeling well    Blood cultures on admission grew MSSA . Lactic acid was elevated initially ,BP stable ,CT chest showed  Opacity in the right posterolateral trachea ,no PE. Echocardiogram showed no vegetation . LUIS no reported vegetations . MEDLINE was placed  . Today   He is feeling better   He denied  cough, CP or SOB   Blood cultures  negative      Past Medical History:   Diagnosis Date    Abdominal aortic aneurysm (AAA) (HCC)     small, post endovascular repair    Alveolar hypoventilation     on oxygen    Asthma     BCC (basal cell carcinoma of skin)     Bladder cancer (HCC)     BPH (benign prostatic hyperplasia)     Cataracts, bilateral     hx of    Chronic constipation     Chronic cor pulmonale (HCC)     on nocturnal oxygen    Chronic hypoxemic respiratory failure (HCC)     COPD [facility]     stage II    Depression     Diverticulitis     GERD (gastroesophageal reflux disease)     Hard of hearing     both ears    History of kidney stones     passed on own years ago    History of nasal obstruction     History of smoking     Hoarseness     multifactorial    Hyperlipidemia     Hypertension     Hypertrophy of nasal turbinates     Irritable bowel     MDRO (multiple drug resistant organisms) resistance 2015    MRSA?  Mild obstructive sleep apnea     On supplemental oxygen therapy     2 liters per minute at night, patient does not use during the day, but supposed to use at 2 liters per minute.     Osteoarthritis     Paralysis of vocal

## 2018-03-07 LAB
-: ABNORMAL
ALBUMIN SERPL-MCNC: 3.4 G/DL (ref 3.5–5.2)
ALBUMIN/GLOBULIN RATIO: ABNORMAL (ref 1–2.5)
ALP BLD-CCNC: 86 U/L (ref 40–129)
ALT SERPL-CCNC: 47 U/L (ref 5–41)
AMORPHOUS: ABNORMAL
AST SERPL-CCNC: 38 U/L
BACTERIA: ABNORMAL
BILIRUB SERPL-MCNC: 0.96 MG/DL (ref 0.3–1.2)
BILIRUBIN DIRECT: 0.34 MG/DL
BILIRUBIN URINE: NEGATIVE
BILIRUBIN, INDIRECT: 0.62 MG/DL (ref 0–1)
CASTS UA: ABNORMAL /LPF
COLOR: YELLOW
COMMENT UA: ABNORMAL
CRYSTALS, UA: ABNORMAL /HPF
EPITHELIAL CELLS UA: ABNORMAL /HPF
GLOBULIN: ABNORMAL G/DL (ref 1.5–3.8)
GLUCOSE BLD-MCNC: 130 MG/DL (ref 75–110)
GLUCOSE BLD-MCNC: 238 MG/DL (ref 75–110)
GLUCOSE BLD-MCNC: 285 MG/DL (ref 75–110)
GLUCOSE URINE: ABNORMAL
KETONES, URINE: NEGATIVE
LEUKOCYTE ESTERASE, URINE: NEGATIVE
MUCUS: ABNORMAL
NITRITE, URINE: NEGATIVE
OTHER OBSERVATIONS UA: ABNORMAL
PH UA: 7.5 (ref 5–8)
PROTEIN UA: NEGATIVE
RBC UA: ABNORMAL /HPF
RENAL EPITHELIAL, UA: ABNORMAL /HPF
SPECIFIC GRAVITY UA: 1.01 (ref 1–1.03)
TOTAL PROTEIN: 7.6 G/DL (ref 6.4–8.3)
TRICHOMONAS: ABNORMAL
TURBIDITY: CLEAR
URINE HGB: ABNORMAL
UROBILINOGEN, URINE: NORMAL
WBC UA: ABNORMAL /HPF
YEAST: ABNORMAL

## 2018-03-07 PROCEDURE — 99232 SBSQ HOSP IP/OBS MODERATE 35: CPT | Performed by: INTERNAL MEDICINE

## 2018-03-07 PROCEDURE — 2060000000 HC ICU INTERMEDIATE R&B

## 2018-03-07 PROCEDURE — 6370000000 HC RX 637 (ALT 250 FOR IP): Performed by: STUDENT IN AN ORGANIZED HEALTH CARE EDUCATION/TRAINING PROGRAM

## 2018-03-07 PROCEDURE — 51798 US URINE CAPACITY MEASURE: CPT

## 2018-03-07 PROCEDURE — 81001 URINALYSIS AUTO W/SCOPE: CPT

## 2018-03-07 PROCEDURE — 6370000000 HC RX 637 (ALT 250 FOR IP): Performed by: INTERNAL MEDICINE

## 2018-03-07 PROCEDURE — 6360000002 HC RX W HCPCS: Performed by: RADIOLOGY

## 2018-03-07 PROCEDURE — 2580000003 HC RX 258: Performed by: INTERNAL MEDICINE

## 2018-03-07 PROCEDURE — 6360000002 HC RX W HCPCS: Performed by: NURSE PRACTITIONER

## 2018-03-07 PROCEDURE — 6370000000 HC RX 637 (ALT 250 FOR IP): Performed by: RADIOLOGY

## 2018-03-07 PROCEDURE — 97530 THERAPEUTIC ACTIVITIES: CPT

## 2018-03-07 PROCEDURE — 6370000000 HC RX 637 (ALT 250 FOR IP): Performed by: NURSE PRACTITIONER

## 2018-03-07 PROCEDURE — 94640 AIRWAY INHALATION TREATMENT: CPT

## 2018-03-07 PROCEDURE — 82947 ASSAY GLUCOSE BLOOD QUANT: CPT

## 2018-03-07 PROCEDURE — 6370000000 HC RX 637 (ALT 250 FOR IP): Performed by: PSYCHIATRY & NEUROLOGY

## 2018-03-07 PROCEDURE — 6360000002 HC RX W HCPCS: Performed by: STUDENT IN AN ORGANIZED HEALTH CARE EDUCATION/TRAINING PROGRAM

## 2018-03-07 PROCEDURE — 6360000002 HC RX W HCPCS: Performed by: INTERNAL MEDICINE

## 2018-03-07 PROCEDURE — 2580000003 HC RX 258: Performed by: STUDENT IN AN ORGANIZED HEALTH CARE EDUCATION/TRAINING PROGRAM

## 2018-03-07 PROCEDURE — 36415 COLL VENOUS BLD VENIPUNCTURE: CPT

## 2018-03-07 PROCEDURE — 99233 SBSQ HOSP IP/OBS HIGH 50: CPT | Performed by: INTERNAL MEDICINE

## 2018-03-07 PROCEDURE — 80076 HEPATIC FUNCTION PANEL: CPT

## 2018-03-07 RX ORDER — RISPERIDONE 1 MG/1
1 TABLET, FILM COATED ORAL 2 TIMES DAILY
Status: DISCONTINUED | OUTPATIENT
Start: 2018-03-07 | End: 2018-03-07

## 2018-03-07 RX ORDER — RISPERIDONE 0.5 MG/1
0.5 TABLET, FILM COATED ORAL 2 TIMES DAILY
Status: DISCONTINUED | OUTPATIENT
Start: 2018-03-07 | End: 2018-03-07

## 2018-03-07 RX ORDER — HALOPERIDOL 5 MG/ML
2 INJECTION INTRAMUSCULAR ONCE
Status: COMPLETED | OUTPATIENT
Start: 2018-03-07 | End: 2018-03-07

## 2018-03-07 RX ORDER — RISPERIDONE 0.5 MG/1
0.5 TABLET, FILM COATED ORAL 2 TIMES DAILY
Status: DISCONTINUED | OUTPATIENT
Start: 2018-03-07 | End: 2018-03-12

## 2018-03-07 RX ORDER — HALOPERIDOL 5 MG/ML
2 INJECTION INTRAMUSCULAR EVERY 6 HOURS PRN
Status: DISCONTINUED | OUTPATIENT
Start: 2018-03-07 | End: 2018-03-08

## 2018-03-07 RX ORDER — DIPHENHYDRAMINE HYDROCHLORIDE 50 MG/ML
25 INJECTION INTRAMUSCULAR; INTRAVENOUS ONCE
Status: COMPLETED | OUTPATIENT
Start: 2018-03-07 | End: 2018-03-07

## 2018-03-07 RX ORDER — TAMSULOSIN HYDROCHLORIDE 0.4 MG/1
0.4 CAPSULE ORAL 2 TIMES DAILY
Status: DISCONTINUED | OUTPATIENT
Start: 2018-03-07 | End: 2018-03-09

## 2018-03-07 RX ADMIN — INSULIN LISPRO 9 UNITS: 100 INJECTION, SOLUTION INTRAVENOUS; SUBCUTANEOUS at 22:21

## 2018-03-07 RX ADMIN — ONDANSETRON 4 MG: 2 INJECTION INTRAMUSCULAR; INTRAVENOUS at 14:04

## 2018-03-07 RX ADMIN — METOPROLOL TARTRATE 25 MG: 25 TABLET, FILM COATED ORAL at 22:49

## 2018-03-07 RX ADMIN — ROPINIROLE HYDROCHLORIDE 2 MG: 2 TABLET, FILM COATED ORAL at 21:47

## 2018-03-07 RX ADMIN — INSULIN LISPRO 6 UNITS: 100 INJECTION, SOLUTION INTRAVENOUS; SUBCUTANEOUS at 16:42

## 2018-03-07 RX ADMIN — TAMSULOSIN HYDROCHLORIDE 0.4 MG: 0.4 CAPSULE ORAL at 09:37

## 2018-03-07 RX ADMIN — WATER 2 G: 1 INJECTION INTRAMUSCULAR; INTRAVENOUS; SUBCUTANEOUS at 16:43

## 2018-03-07 RX ADMIN — Medication 10 ML: at 10:04

## 2018-03-07 RX ADMIN — FAMOTIDINE 20 MG: 20 TABLET, FILM COATED ORAL at 09:37

## 2018-03-07 RX ADMIN — HALOPERIDOL LACTATE 2 MG: 5 INJECTION, SOLUTION INTRAMUSCULAR at 05:58

## 2018-03-07 RX ADMIN — Medication 40 UNITS: at 22:22

## 2018-03-07 RX ADMIN — SIMVASTATIN 20 MG: 20 TABLET, FILM COATED ORAL at 21:45

## 2018-03-07 RX ADMIN — ENOXAPARIN SODIUM 40 MG: 40 INJECTION SUBCUTANEOUS at 09:37

## 2018-03-07 RX ADMIN — IPRATROPIUM BROMIDE AND ALBUTEROL SULFATE 1 AMPULE: .5; 3 SOLUTION RESPIRATORY (INHALATION) at 21:55

## 2018-03-07 RX ADMIN — RISPERIDONE 0.5 MG: 0.5 TABLET, FILM COATED ORAL at 21:49

## 2018-03-07 RX ADMIN — LISINOPRIL 10 MG: 10 TABLET ORAL at 09:37

## 2018-03-07 RX ADMIN — SODIUM CHLORIDE: 0.9 INJECTION, SOLUTION INTRAVENOUS at 18:20

## 2018-03-07 RX ADMIN — RISPERIDONE 0.5 MG: 0.5 TABLET, FILM COATED ORAL at 12:07

## 2018-03-07 RX ADMIN — FINASTERIDE 5 MG: 5 TABLET, FILM COATED ORAL at 09:37

## 2018-03-07 RX ADMIN — METOPROLOL TARTRATE 25 MG: 25 TABLET, FILM COATED ORAL at 09:37

## 2018-03-07 RX ADMIN — HALOPERIDOL LACTATE 2 MG: 5 INJECTION, SOLUTION INTRAMUSCULAR at 00:36

## 2018-03-07 RX ADMIN — INSULIN LISPRO 9 UNITS: 100 INJECTION, SOLUTION INTRAVENOUS; SUBCUTANEOUS at 09:39

## 2018-03-07 RX ADMIN — TAMSULOSIN HYDROCHLORIDE 0.4 MG: 0.4 CAPSULE ORAL at 21:45

## 2018-03-07 RX ADMIN — DIPHENHYDRAMINE HYDROCHLORIDE 25 MG: 50 INJECTION, SOLUTION INTRAMUSCULAR; INTRAVENOUS at 02:45

## 2018-03-07 NOTE — PROGRESS NOTES
Patient up again squeezed between the side rails and came at staff hanging. Security called. Leigh Ann Ware arrived and assessed patient again.  Haldol IM given and a UA sent

## 2018-03-07 NOTE — PROGRESS NOTES
3/10  MD WING Benedict [address], [address] [place] [address].    Phone [phone]   Fax: [phone]  Answering Service: [phone]

## 2018-03-07 NOTE — PLAN OF CARE
Problem: Falls - Risk of  Goal: Absence of falls  Outcome: Ongoing  Patient confused, fall precautions continued    Problem: Pain:  Goal: Control of acute pain  Control of acute pain   Outcome: Ongoing  Patient has no complaints of pain, no medication given this shift

## 2018-03-07 NOTE — PROGRESS NOTES
Patient escalates to yelling and moves to chair next to window. Pulls at phone at which he breaks the cord. Begins to hit the window with the phone. Grabs the gait belt and swings it around hitting window and in the direction of staff. Again security is called. He continues to throw toiletry items and items he can get his hands on at staff. Shubham Botello NP called, she arrives to evaluate and orders for Haldol received. With assistance of security, haldol given. Patient placed back in bed by security and staff. Remain at bedside until patient is calm. Bed alarm on.

## 2018-03-07 NOTE — PROGRESS NOTES
Psychiatry consultation. Chart reviewed and patient interviewed. This is an 78-year-old white male patient who was admitted on 21 February 2018 because of sepsis and pneumonia. He is not able to provide any information and keeps rambling incoherently response to any questions but his wife who was at the bedside reported that he was doing quite well to the day he was admitted here and has been doing better but started getting quite agitated and confused about 4 days. She denies any history of any psychiatric problems or any psychiatric treatment prior to this. She denies any history of 4 substance abuse. She reports that they've been  for 51 years and they have 4 children. He was working at [employer] for 40 years still he retired 16 years ago. REVIEW OF SYSTEMS:    · Cardiovascular: Negative for lightheadedness/orthostatic symptoms ,chest pain, dyspnea on exertion, palpitations or loss of consciousness. · Respiratory: Positive for some dyspnea but improving  · Gastrointestinal: Negative for nausea/vomiting, change in bowel habits, abdominal pain, dysphagia/appetite loss, hematemesis, blood in stools.           OBJECTIVE:    BP (!) 164/82   Pulse 113   Temp 97.9 °F (36.6 °C) (Axillary)   Resp 20   Ht 5' 7\" (1.702 m)   Wt 191 lb 2.2 oz (86.7 kg)   SpO2 92%   BMI 29.94 kg/m²    Oxygen with a walker  · Lungs: Mild wheeze bilaterally good air entry  · Heart: regular rate and rhythm, S1, S2 normal, no murmur, click, rub or gallop  · Abdomen: soft, non-tender; bowel sounds normal; no masses,  no organomegaly  · Extremities: extremities normal, atraumatic, no cyanosis or edema  · Neurological:  Reflexes normal and symmetric.  Sensation grossly normal  · Skin - no rash, no lump   · Eye- no icterus no redness  · GI-oral mucosa moist,  · Lymphatic system-no lymphadenopathy no splenomegaly  · Mouth- mucous membrane moist  · Head-normocephalic atraumatic  · Neck- supple no carotid bruit,Thyroid not

## 2018-03-07 NOTE — PROGRESS NOTES
for Influenza A + B antigens. Assessment:   Staph aureus bacteremia ,possible pulmonary source. Sepsis secondary to above resolved. Active Problems:    COPD (chronic obstructive pulmonary disease) (HCC)    Hyperlipidemia    Hypertension    Diabetes mellitus type 2, insulin dependent (HCC)    Thrombocytopenia (HCC)  H/o Bladder CA  Recommendations:      IV Ceftriaxone till 3/10 /18  UA today unremarkable .     LFTS   Lawrence Lee

## 2018-03-07 NOTE — PROGRESS NOTES
Patient agitated, will not stay in bed. Sitting at the side of the bed. Patient is disoriented to place and situation and believes we are holding him here against his will and going to \"kill him\"  Reassurance is not working. Patient yelling call my son, at which time we attempt to call son, with no answer. Patient demands we call the police at which time we called security. Security arrives and patient does not believe they are security and are also there to harm him. Attempt to get additional medication to help patient sleep.

## 2018-03-07 NOTE — PROGRESS NOTES
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, plan for LUIS  General  Chart Reviewed: Yes  Patient assessed for rehabilitation services?: Yes  Additional Pertinent Hx: Overnight had some combativeness and rstraints - now with One-On-One with better cooperation , but still confused   Family / Caregiver Present: Yes (son present and supportive, has his own health problems)  Diagnosis: pt admit 2-21 with a fall, CT head, neck and pelvis negative, septicemia 2/2 pneumonia vs UTI,sepsis, COPD, CT L elbow to r/o septic arthritis; PMHx of 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, plan for LUIS    Restrictions  Restrictions/Precautions: Fall Risk, Contact Precautions, Isolation, Cardiac, General Precautions (Safety - confused )  Other position/activity restrictions: on nc oxygen  Position Activity Restriction  Other position/activity restrictions: on nc oxygen       Subjective  Subjective: Up and assisting with his own care       Objective  Cognition  Overall Cognitive Status: Exceptions  Following Commands: Follows one step commands with increased time; Follows one step commands with repetition  Attention Span: Difficulty dividing attention; Attends with cues to redirect  Safety Judgement: Decreased awareness of need for assistance;Decreased awareness of need for safety  Initiation: Requires cues for some  Bed mobility  Rolling to Left: Stand by assistance  Rolling to Right: Stand by assistance  Sit to Supine: Stand by assistance  Balance  Sitting Balance: Supervision (Safety)  Standing Balance: Stand by assistance (Safety)  Standing Balance  Sit to stand: Stand by assistance  Stand to sit: Stand by

## 2018-03-07 NOTE — PROGRESS NOTES
Patient frequently urinating, post void bladder scan performed [id number]. Bladder scan showed 222.  Linda Prather notified
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
## 2018-03-07 NOTE — PROGRESS NOTES
Pulmonary Progress Note  [facility]      Patient - Bree Multani,  Age - [de-identified] y.o.    - 1940      Room Number - 2067/2067-01   N -  [id number]   Acct # - [de-identified]  Date of Admission -  2018  2:48 PM        Consulting [address] [place] [address],[address], [place]  Primary Care Physician - Cynthia Duarte DO     SUBJECTIVE   No distress  Has periods of agitation     OBJECTIVE   VITALS    height is 5' 7\" (1.702 m) and weight is 191 lb 2.2 oz (86.7 kg). His axillary temperature is 97.9 °F (36.6 °C). His blood pressure is 164/82 (abnormal) and his pulse is 113. His respiration is 20 and oxygen saturation is 92%. Body mass index is 29.94 kg/m². Temperature Range: Temp: 97.9 °F (36.6 °C) Temp  Av.9 °F (36.6 °C)  Min: 97.7 °F (36.5 °C)  Max: 98.1 °F (36.7 °C)  BP Range:  Systolic (81OST), BW , Min:101 , HOQ:290     Diastolic (06DMS), XNA:11, Min:67, Max:82    Pulse Range: Pulse  Av.7  Min: 84  Max: 113  Respiration Range: Resp  Av.8  Min: 16  Max: 20  Current Pulse Ox[de-identified]  SpO2: 92 %  24HR Pulse Ox Range:  SpO2  Av.4 %  Min: 91 %  Max: 93 %  Oxygen Amount and Delivery: O2 Flow Rate (L/min): 2.5 L/min    Wt Readings from Last 3 Encounters:   18 191 lb 2.2 oz (86.7 kg)   17 194 lb 9.6 oz (88.3 kg)   17 180 lb (81.6 kg)       I/O (24 Hours)    Intake/Output Summary (Last 24 hours) at 18 1236  Last data filed at 18 1214   Gross per 24 hour   Intake             1160 ml   Output             2050 ml   Net             -890 ml       EXAM     General Appearance  Awake, alert, oriented, in no acute distress  HEENT - normocephalic, atraumatic. Neck - Supple,  trachea midline   Lungs - coarse  Heart Exam:PMI normal. No lifts, heaves, or thrills. RRR. No murmurs, clicks, gallops, or rubs  Abdomen Exam: Abdomen soft, non-tender.  BS normal. No masses,   Extremity Exam: no edema    MEDS      tamsulosin  0.4 mg Oral BID    risperiDONE  0.5 mg Oral BID    ipratropium-albuterol  1 ampule Inhalation TID    insulin glargine  40 Units Subcutaneous Nightly    lisinopril  10 mg Oral Daily    insulin lispro  0-18 Units Subcutaneous 4x Daily AC & HS    rOPINIRole  2 mg Oral Nightly    metoprolol tartrate  25 mg Oral BID    famotidine  20 mg Oral BID    cefTRIAXone (ROCEPHIN) IV  2 g Intravenous Q24H    sodium chloride flush  10 mL Intravenous 2 times per day    enoxaparin  40 mg Subcutaneous Daily    finasteride  5 mg Oral Daily    simvastatin  20 mg Oral Nightly      dextrose       haloperidol lactate, melatonin ER, hydrALAZINE, ondansetron, albuterol sulfate HFA, sodium chloride flush, acetaminophen, sodium phosphate IVPB **OR** sodium phosphate IVPB, potassium chloride **OR** potassium chloride **OR** potassium chloride, magnesium sulfate, glucose, dextrose, glucagon (rDNA), dextrose, albuterol    LABS   CBC   Recent Labs      03/06/18   0554   WBC  10.2   HGB  14.1   HCT  41.4   MCV  100.8*   PLT  139*     BMP: Lab Results   Component Value Date     03/06/2018    K 4.7 03/06/2018    CL 94 03/06/2018    CO2 33 03/06/2018    BUN 21 03/06/2018    LABALBU 3.6 02/21/2018    CREATININE 0.83 03/06/2018    CALCIUM 9.6 03/06/2018    GFRAA >60 03/06/2018    LABGLOM >60 03/06/2018     ABGs:  Lab Results   Component Value Date    PHART 7.438 02/26/2018    PO2ART 72.5 02/26/2018    XKJ2MJB 43.3 02/26/2018    Lab Results   Component Value Date    MODE CPAP 02/26/2018     Ionized Calcium:  No results found for: IONCA  Magnesium:    Lab Results   Component Value Date    MG 2.5 02/26/2018     Phosphorus:    Lab Results   Component Value Date    PHOS 2.1 02/26/2018        LIVER PROFILE No results for input(s): AST, ALT, LIPASE, BILIDIR, BILITOT, ALKPHOS in the last 72 hours. Invalid input(s): AMYLASE,  ALB  INR No results for input(s): INR in the last 72 hours.   PTT   Lab Results   Component Value Date    APTT 30.3 02/23/2018 RADIOLOGY     (See actual reports for details)    ASSESSMENT/PLAN     Patient Active Problem List   Diagnosis    [facility]) [facility]    Diabetes 1.5, managed as type 2 ([facility]    Hyperlipidemia    Hypertension    History of bladder cancer    Tobacco use    Sepsis ([facility]    Diabetes mellitus type 2, insulin dependent ([facility]    Thrombocytopenia ([facility]    Bacteremia     The pulm status is stable    ECF when other services areOK    Electronically signed by Nikko King MD on 3/7/2018 at 12:36 PM

## 2018-03-08 LAB
ABSOLUTE EOS #: 0 K/UL (ref 0–0.4)
ABSOLUTE IMMATURE GRANULOCYTE: ABNORMAL K/UL (ref 0–0.3)
ABSOLUTE LYMPH #: 1.3 K/UL (ref 1–4.8)
ABSOLUTE MONO #: 1 K/UL (ref 0.1–1.3)
ANION GAP SERPL CALCULATED.3IONS-SCNC: 12 MMOL/L (ref 9–17)
BASOPHILS # BLD: 0 % (ref 0–2)
BASOPHILS ABSOLUTE: 0 K/UL (ref 0–0.2)
BUN BLDV-MCNC: 29 MG/DL (ref 8–23)
BUN/CREAT BLD: ABNORMAL (ref 9–20)
CALCIUM SERPL-MCNC: 9.5 MG/DL (ref 8.6–10.4)
CHLORIDE BLD-SCNC: 99 MMOL/L (ref 98–107)
CO2: 31 MMOL/L (ref 20–31)
CREAT SERPL-MCNC: 1.08 MG/DL (ref 0.7–1.2)
DIFFERENTIAL TYPE: ABNORMAL
EOSINOPHILS RELATIVE PERCENT: 0 % (ref 0–4)
GFR AFRICAN AMERICAN: >60 ML/MIN
GFR NON-AFRICAN AMERICAN: >60 ML/MIN
GFR SERPL CREATININE-BSD FRML MDRD: ABNORMAL ML/MIN/{1.73_M2}
GFR SERPL CREATININE-BSD FRML MDRD: ABNORMAL ML/MIN/{1.73_M2}
GLUCOSE BLD-MCNC: 183 MG/DL (ref 70–99)
GLUCOSE BLD-MCNC: 185 MG/DL (ref 75–110)
GLUCOSE BLD-MCNC: 195 MG/DL (ref 75–110)
GLUCOSE BLD-MCNC: 195 MG/DL (ref 75–110)
GLUCOSE BLD-MCNC: 264 MG/DL (ref 75–110)
GLUCOSE BLD-MCNC: 283 MG/DL (ref 75–110)
HCT VFR BLD CALC: 38.6 % (ref 41–53)
HEMOGLOBIN: 13.1 G/DL (ref 13.5–17.5)
IMMATURE GRANULOCYTES: ABNORMAL %
LYMPHOCYTES # BLD: 11 % (ref 24–44)
MCH RBC QN AUTO: 34 PG (ref 26–34)
MCHC RBC AUTO-ENTMCNC: 33.8 G/DL (ref 31–37)
MCV RBC AUTO: 100.4 FL (ref 80–100)
MONOCYTES # BLD: 8 % (ref 1–7)
NRBC AUTOMATED: ABNORMAL PER 100 WBC
PDW BLD-RTO: 13.8 % (ref 11.5–14.9)
PLATELET # BLD: 136 K/UL (ref 150–450)
PLATELET ESTIMATE: ABNORMAL
PMV BLD AUTO: 8.8 FL (ref 6–12)
POTASSIUM SERPL-SCNC: 5.5 MMOL/L (ref 3.7–5.3)
RBC # BLD: 3.84 M/UL (ref 4.5–5.9)
RBC # BLD: ABNORMAL 10*6/UL
SEG NEUTROPHILS: 81 % (ref 36–66)
SEGMENTED NEUTROPHILS ABSOLUTE COUNT: 9.6 K/UL (ref 1.3–9.1)
SODIUM BLD-SCNC: 142 MMOL/L (ref 135–144)
WBC # BLD: 11.9 K/UL (ref 3.5–11)
WBC # BLD: ABNORMAL 10*3/UL

## 2018-03-08 PROCEDURE — 93970 EXTREMITY STUDY: CPT

## 2018-03-08 PROCEDURE — 6370000000 HC RX 637 (ALT 250 FOR IP): Performed by: STUDENT IN AN ORGANIZED HEALTH CARE EDUCATION/TRAINING PROGRAM

## 2018-03-08 PROCEDURE — 2060000000 HC ICU INTERMEDIATE R&B

## 2018-03-08 PROCEDURE — 6360000002 HC RX W HCPCS: Performed by: NURSE PRACTITIONER

## 2018-03-08 PROCEDURE — 6360000002 HC RX W HCPCS: Performed by: INTERNAL MEDICINE

## 2018-03-08 PROCEDURE — 6370000000 HC RX 637 (ALT 250 FOR IP): Performed by: RADIOLOGY

## 2018-03-08 PROCEDURE — 85025 COMPLETE CBC W/AUTO DIFF WBC: CPT

## 2018-03-08 PROCEDURE — 99232 SBSQ HOSP IP/OBS MODERATE 35: CPT | Performed by: INTERNAL MEDICINE

## 2018-03-08 PROCEDURE — 6360000002 HC RX W HCPCS: Performed by: STUDENT IN AN ORGANIZED HEALTH CARE EDUCATION/TRAINING PROGRAM

## 2018-03-08 PROCEDURE — 2580000003 HC RX 258: Performed by: INTERNAL MEDICINE

## 2018-03-08 PROCEDURE — 99233 SBSQ HOSP IP/OBS HIGH 50: CPT | Performed by: INTERNAL MEDICINE

## 2018-03-08 PROCEDURE — 80048 BASIC METABOLIC PNL TOTAL CA: CPT

## 2018-03-08 PROCEDURE — 82947 ASSAY GLUCOSE BLOOD QUANT: CPT

## 2018-03-08 PROCEDURE — 2580000003 HC RX 258: Performed by: STUDENT IN AN ORGANIZED HEALTH CARE EDUCATION/TRAINING PROGRAM

## 2018-03-08 PROCEDURE — 2500000003 HC RX 250 WO HCPCS: Performed by: PSYCHIATRY & NEUROLOGY

## 2018-03-08 PROCEDURE — 6370000000 HC RX 637 (ALT 250 FOR IP): Performed by: INTERNAL MEDICINE

## 2018-03-08 PROCEDURE — 6370000000 HC RX 637 (ALT 250 FOR IP): Performed by: PSYCHIATRY & NEUROLOGY

## 2018-03-08 RX ORDER — RISPERIDONE 1 MG/1
1 TABLET, FILM COATED ORAL ONCE
Status: DISCONTINUED | OUTPATIENT
Start: 2018-03-08 | End: 2018-03-13 | Stop reason: ALTCHOICE

## 2018-03-08 RX ORDER — IPRATROPIUM BROMIDE AND ALBUTEROL SULFATE 2.5; .5 MG/3ML; MG/3ML
1 SOLUTION RESPIRATORY (INHALATION) EVERY 6 HOURS PRN
Status: DISCONTINUED | OUTPATIENT
Start: 2018-03-08 | End: 2018-03-12

## 2018-03-08 RX ORDER — OLANZAPINE 10 MG/1
5 INJECTION, POWDER, LYOPHILIZED, FOR SOLUTION INTRAMUSCULAR 3 TIMES DAILY PRN
Status: DISCONTINUED | OUTPATIENT
Start: 2018-03-08 | End: 2018-03-12

## 2018-03-08 RX ADMIN — Medication 40 UNITS: at 20:25

## 2018-03-08 RX ADMIN — OLANZAPINE 5 MG: 10 INJECTION, POWDER, FOR SOLUTION INTRAMUSCULAR at 05:00

## 2018-03-08 RX ADMIN — Medication 10 ML: at 11:00

## 2018-03-08 RX ADMIN — ENOXAPARIN SODIUM 40 MG: 40 INJECTION SUBCUTANEOUS at 10:58

## 2018-03-08 RX ADMIN — INSULIN LISPRO 9 UNITS: 100 INJECTION, SOLUTION INTRAVENOUS; SUBCUTANEOUS at 21:40

## 2018-03-08 RX ADMIN — INSULIN LISPRO 3 UNITS: 100 INJECTION, SOLUTION INTRAVENOUS; SUBCUTANEOUS at 17:03

## 2018-03-08 RX ADMIN — METOPROLOL TARTRATE 25 MG: 25 TABLET, FILM COATED ORAL at 10:58

## 2018-03-08 RX ADMIN — FINASTERIDE 5 MG: 5 TABLET, FILM COATED ORAL at 10:57

## 2018-03-08 RX ADMIN — Medication 10 ML: at 22:29

## 2018-03-08 RX ADMIN — HALOPERIDOL LACTATE 2 MG: 5 INJECTION, SOLUTION INTRAMUSCULAR at 01:36

## 2018-03-08 RX ADMIN — TAMSULOSIN HYDROCHLORIDE 0.4 MG: 0.4 CAPSULE ORAL at 10:57

## 2018-03-08 RX ADMIN — RISPERIDONE 0.5 MG: 0.5 TABLET, FILM COATED ORAL at 11:00

## 2018-03-08 RX ADMIN — INSULIN LISPRO 3 UNITS: 100 INJECTION, SOLUTION INTRAVENOUS; SUBCUTANEOUS at 11:55

## 2018-03-08 RX ADMIN — LISINOPRIL 10 MG: 10 TABLET ORAL at 10:57

## 2018-03-08 RX ADMIN — INSULIN LISPRO 3 UNITS: 100 INJECTION, SOLUTION INTRAVENOUS; SUBCUTANEOUS at 10:06

## 2018-03-08 RX ADMIN — CEFTRIAXONE SODIUM: 2 INJECTION, POWDER, FOR SOLUTION INTRAMUSCULAR; INTRAVENOUS at 17:03

## 2018-03-08 NOTE — PROGRESS NOTES
Physical Therapy  DATE: 3/8/2018    NAME: Sheryl Segura  MRN: [id number]   : 1940    Patient not seen this date for Physical Therapy due to:  [] Blood transfusion in progress  [] Hemodialysis  []  Patient Declined  [] Spine Precautions   [] Strict Bedrest  [] Surgery/ Procedure  [] Testing      [x] Other: TRINY Schroeder deferred tx at 1230 due to pt fatigue; pt unrousable at 1500, Ruba deferred again. [] PT being discontinued at this time. Patient independent. No further needs. [] PT being discontinued at this time as the patient has been transferred to palliative care. No further needs.     José Miguel Ingram, PTA

## 2018-03-08 NOTE — PROGRESS NOTES
[address]    Date:   3/8/2018  Patient name:  Khushbu Roque  Date of admission:  2/21/2018  2:48 PM  MRN:   [id number]  YOB: 1940      HPI       Overnight confused  agitatied  Low bp  transferd from med surg    REVIEW OF SYSTEMS:    · Unable to get ros  · ams  ·         OBJECTIVE:    BP (!) 116/90   Pulse 96   Temp 98.4 °F (36.9 °C) (Oral)   Resp 16   Ht 5' 7\" (1.702 m)   Wt 191 lb 2.2 oz (86.7 kg)   SpO2 96%   BMI 29.94 kg/m²    Oxygen with a walker  · Lungs: Mild wheeze bilaterally good air entry  · Heart: regular rate and rhythm, S1, S2 normal, no murmur, click, rub or gallop  · Abdomen: soft, non-tender; bowel sounds normal; no masses,  no organomegaly  · Extremities: extremities normal, atraumatic, no cyanosis or edema  · Neurological:  Reflexes normal and symmetric. Sensation grossly normal  · Confused  · With sitter  ·   · Skin - no rash, no lump   · Eye- no icterus no redness  · GI-oral mucosa moist,  · Lymphatic system-no lymphadenopathy no splenomegaly  · Mouth- mucous membrane moist  · Head-normocephalic atraumatic  · Neck- supple no carotid bruit,Thyroid not palpable. Laboratory Testing:  CBC:   Recent Labs      03/08/18   0525   WBC  11.9*   HGB  13.1*   PLT  136*     BMP:    Recent Labs      03/06/18   0554  03/08/18   0525   NA  138  142   K  4.7  5.5*   CL  94*  99   CO2  33*  31   BUN  21  29*   CREATININE  0.83  1.08   GLUCOSE  229*  183*     S. Calcium:  Recent Labs      03/08/18   0525   CALCIUM  9.5     S. Ionized Calcium:No results for input(s): IONCA in the last 72 hours. S. Magnesium:No results for input(s): MG in the last 72 hours. S. Phosphorus:No results for input(s): PHOS in the last 72 hours.   S. Glucose:  Recent Labs      03/07/18   1612  03/07/18   2126  03/08/18   0732   POCGLU  238*  283*  185*     Glycosylated hemoglobin A1C:   No results for input(s): LABA1C in the last

## 2018-03-08 NOTE — PROGRESS NOTES
RN spoke with Dr. Junior Alvarez in regards to patient being aggressive and agitated and wont get his oxygen on at this time. Updated that the patient became more agitated after receiving haldol last night. Received order for an additional 1 mg of Risperdal this evening and wrist restraints for the patient.

## 2018-03-08 NOTE — PROGRESS NOTES
Infectious disease Consult Note      Patient: Ayaka Moody  : 1940  Acct#:  [id number]     Date:  3/8/2018    Subjective:       History of Present Illness  Patient is a [de-identified] y.o.  male admitted with Sepsis ([facility] [A41.9] who is seen in consult for the same . The patient was brought to the ER after a fall at the bathroom s ,body ache ,fever and chills ,chronic cough and SOB, not feeling well    Blood cultures on admission grew MSSA . Lactic acid was elevated initially ,BP stable ,CT chest showed  Opacity in the right posterolateral trachea ,no PE. Echocardiogram showed no vegetation . LUIS no reported vegetations . MEDLINE was placed  . Blood cultures  negative  Today   He is still agitatated. A&Ox0  Had increased redness in left foot compared to right foot        Past Medical History:   Diagnosis Date    Abdominal aortic aneurysm (AAA) ([facility]     small, post endovascular repair    Alveolar hypoventilation     on oxygen    Asthma     BCC (basal cell carcinoma of skin)     Bladder cancer (HCC)     BPH (benign prostatic hyperplasia)     Cataracts, bilateral     hx of    Chronic constipation     Chronic cor pulmonale (HCC)     on nocturnal oxygen    Chronic hypoxemic respiratory failure (HCC)     COPD [facility]chronic obstructive [facility] disease) [facility]     stage II    Depression     Diverticulitis     GERD (gastroesophageal reflux disease)     Hard of hearing     both ears    History of kidney stones     passed on own years ago    History of nasal obstruction     History of smoking     Hoarseness     multifactorial    Hyperlipidemia     Hypertension     Hypertrophy of nasal turbinates     Irritable bowel     MDRO (multiple drug resistant organisms) resistance 2015    MRSA?  Mild obstructive sleep apnea     On supplemental oxygen therapy     2 liters per minute at night, patient does not use during the day, but supposed to use at 2 liters per minute.     daily 90 tablet 3    B-D UF III MINI PEN NEEDLES 31G X 5 MM MISC USE WITH INSULIN 4 TIMES DAILY 100 each 2           Allergies  Allergies   Allergen Reactions    Ativan [Lorazepam]      hallucinations    Codeine      Cough syrup gi upset        Social   Social History   Substance Use Topics    Smoking status: Former Smoker     Packs/day: 2.00     Years: 50.00     Types: Cigarettes     Quit date: 12/14/2015    Smokeless tobacco: Never Used    Alcohol use No      Comment: rarely             Family History   Problem Relation Age of Onset    Diabetes Mother     Other Other      Testicular rhabomyosarcoma           Review of Systems        Tolerating antibiotics. Physical Exam  BP (!) 116/90   Pulse 96   Temp 98.4 °F (36.9 °C) (Oral)   Resp 16   Ht 5' 7\" (1.702 m)   Wt 191 lb 2.2 oz (86.7 kg)   SpO2 96%   BMI 29.94 kg/m²           General Appearance: AO0 ,NAD  Skin: warm and dry, no rash,upper back  ulcer with less  surrounding erythema ,no drainage     Neck: neck supple    Pulmonary/Chest: Decreased aeration bilaterally-  Cardiovascular: normal rate, regular rhythm, normal S1 and S2, no murmurs  Abdomen: soft, non-distended, normal bowel sounds  Extremities: no cyanosis, clubbing. Both feet are red, L>R  Musculoskeletal:  no joint swelling  MEDLINE SITE OK     Data Review:    Recent Labs      03/06/18   0554  03/08/18   0525   WBC  10.2  11.9*   HGB  14.1  13.1*   HCT  41.4  38.6*   MCV  100.8*  100.4*   PLT  139*  136*     Component Results     Component Collected Lab   Specimen Description 02/22/2018  5:35 AM [facility]   . NASOPHARYNGEAL SWAB Performed at [place] [address]    Specimen Description 02/22/2018  5:35 AM [facility].  [place], [address] [place] [address] [phone]    Special Requests 02/22/2018  5:35 AM [address] [facility]   NOT REPORTED    Direct Exam 02/22/2018  5:35 AM [address]

## 2018-03-08 NOTE — PLAN OF CARE
Problem: Falls - Risk of  Goal: Absence of falls  Outcome: Ongoing  Pt assessed as a fall risk this shift. Remains free from falls and accidental injury at this time. Fall precautions in place, including falling star sign and fall risk band on pt. Floor free from obstacles, and bed is locked and in lowest position. Adequate lighting provided. Pt encouraged to call before getting OOB for any need. Bed alarm activated. Will continue to monitor needs during hourly rounding, and reinforce education on use of call light.         Problem: OXYGENATION/RESPIRATORY FUNCTION  Goal: Patient will maintain patent airway  Outcome: Ongoing  Patent airway maintained this shift    Problem: Pain:  Goal: Control of acute pain  Control of acute pain   Outcome: Ongoing  No s/s of pain this shift    Problem: Skin Integrity:  Goal: Will show no infection signs and symptoms  Will show no infection signs and symptoms   Outcome: Ongoing  No s/s of infection noted this shift

## 2018-03-09 LAB
ABSOLUTE EOS #: 0 K/UL (ref 0–0.4)
ABSOLUTE IMMATURE GRANULOCYTE: ABNORMAL K/UL (ref 0–0.3)
ABSOLUTE LYMPH #: 1.5 K/UL (ref 1–4.8)
ABSOLUTE MONO #: 0.8 K/UL (ref 0.1–1.3)
AMMONIA: 31 UMOL/L (ref 16–60)
ANION GAP SERPL CALCULATED.3IONS-SCNC: 10 MMOL/L (ref 9–17)
BASOPHILS # BLD: 1 % (ref 0–2)
BASOPHILS ABSOLUTE: 0.1 K/UL (ref 0–0.2)
BUN BLDV-MCNC: 38 MG/DL (ref 8–23)
BUN/CREAT BLD: ABNORMAL (ref 9–20)
CALCIUM SERPL-MCNC: 8.6 MG/DL (ref 8.6–10.4)
CHLORIDE BLD-SCNC: 105 MMOL/L (ref 98–107)
CO2: 29 MMOL/L (ref 20–31)
CREAT SERPL-MCNC: 1.24 MG/DL (ref 0.7–1.2)
DIFFERENTIAL TYPE: ABNORMAL
EOSINOPHILS RELATIVE PERCENT: 0 % (ref 0–4)
GFR AFRICAN AMERICAN: >60 ML/MIN
GFR NON-AFRICAN AMERICAN: 56 ML/MIN
GFR SERPL CREATININE-BSD FRML MDRD: ABNORMAL ML/MIN/{1.73_M2}
GFR SERPL CREATININE-BSD FRML MDRD: ABNORMAL ML/MIN/{1.73_M2}
GLUCOSE BLD-MCNC: 109 MG/DL (ref 75–110)
GLUCOSE BLD-MCNC: 115 MG/DL (ref 70–99)
GLUCOSE BLD-MCNC: 127 MG/DL (ref 75–110)
GLUCOSE BLD-MCNC: 184 MG/DL (ref 75–110)
GLUCOSE BLD-MCNC: 319 MG/DL (ref 75–110)
HCT VFR BLD CALC: 36.1 % (ref 41–53)
HEMOGLOBIN: 12.2 G/DL (ref 13.5–17.5)
IMMATURE GRANULOCYTES: ABNORMAL %
LYMPHOCYTES # BLD: 16 % (ref 24–44)
MCH RBC QN AUTO: 34.1 PG (ref 26–34)
MCHC RBC AUTO-ENTMCNC: 33.9 G/DL (ref 31–37)
MCV RBC AUTO: 100.5 FL (ref 80–100)
MONOCYTES # BLD: 8 % (ref 1–7)
NRBC AUTOMATED: ABNORMAL PER 100 WBC
PDW BLD-RTO: 13.4 % (ref 11.5–14.9)
PLATELET # BLD: 134 K/UL (ref 150–450)
PLATELET ESTIMATE: ABNORMAL
PMV BLD AUTO: 8.6 FL (ref 6–12)
POTASSIUM SERPL-SCNC: 4.8 MMOL/L (ref 3.7–5.3)
RBC # BLD: 3.59 M/UL (ref 4.5–5.9)
RBC # BLD: ABNORMAL 10*6/UL
SEG NEUTROPHILS: 75 % (ref 36–66)
SEGMENTED NEUTROPHILS ABSOLUTE COUNT: 7.4 K/UL (ref 1.3–9.1)
SODIUM BLD-SCNC: 144 MMOL/L (ref 135–144)
WBC # BLD: 9.7 K/UL (ref 3.5–11)
WBC # BLD: ABNORMAL 10*3/UL

## 2018-03-09 PROCEDURE — 6370000000 HC RX 637 (ALT 250 FOR IP): Performed by: PSYCHIATRY & NEUROLOGY

## 2018-03-09 PROCEDURE — 6360000002 HC RX W HCPCS: Performed by: STUDENT IN AN ORGANIZED HEALTH CARE EDUCATION/TRAINING PROGRAM

## 2018-03-09 PROCEDURE — 6370000000 HC RX 637 (ALT 250 FOR IP): Performed by: RADIOLOGY

## 2018-03-09 PROCEDURE — 2580000003 HC RX 258: Performed by: INTERNAL MEDICINE

## 2018-03-09 PROCEDURE — 6370000000 HC RX 637 (ALT 250 FOR IP): Performed by: INTERNAL MEDICINE

## 2018-03-09 PROCEDURE — 6370000000 HC RX 637 (ALT 250 FOR IP): Performed by: STUDENT IN AN ORGANIZED HEALTH CARE EDUCATION/TRAINING PROGRAM

## 2018-03-09 PROCEDURE — 6360000002 HC RX W HCPCS: Performed by: INTERNAL MEDICINE

## 2018-03-09 PROCEDURE — 97530 THERAPEUTIC ACTIVITIES: CPT

## 2018-03-09 PROCEDURE — 2060000000 HC ICU INTERMEDIATE R&B

## 2018-03-09 PROCEDURE — 82947 ASSAY GLUCOSE BLOOD QUANT: CPT

## 2018-03-09 PROCEDURE — 97110 THERAPEUTIC EXERCISES: CPT

## 2018-03-09 PROCEDURE — 82140 ASSAY OF AMMONIA: CPT

## 2018-03-09 PROCEDURE — 2580000003 HC RX 258: Performed by: STUDENT IN AN ORGANIZED HEALTH CARE EDUCATION/TRAINING PROGRAM

## 2018-03-09 PROCEDURE — 99232 SBSQ HOSP IP/OBS MODERATE 35: CPT | Performed by: INTERNAL MEDICINE

## 2018-03-09 PROCEDURE — 85025 COMPLETE CBC W/AUTO DIFF WBC: CPT

## 2018-03-09 PROCEDURE — 36592 COLLECT BLOOD FROM PICC: CPT

## 2018-03-09 PROCEDURE — 80048 BASIC METABOLIC PNL TOTAL CA: CPT

## 2018-03-09 PROCEDURE — 6370000000 HC RX 637 (ALT 250 FOR IP): Performed by: NURSE PRACTITIONER

## 2018-03-09 PROCEDURE — 99233 SBSQ HOSP IP/OBS HIGH 50: CPT | Performed by: INTERNAL MEDICINE

## 2018-03-09 PROCEDURE — 2580000003 HC RX 258: Performed by: NURSE PRACTITIONER

## 2018-03-09 PROCEDURE — 97116 GAIT TRAINING THERAPY: CPT

## 2018-03-09 RX ORDER — 0.9 % SODIUM CHLORIDE 0.9 %
500 INTRAVENOUS SOLUTION INTRAVENOUS ONCE
Status: COMPLETED | OUTPATIENT
Start: 2018-03-09 | End: 2018-03-09

## 2018-03-09 RX ORDER — 0.9 % SODIUM CHLORIDE 0.9 %
1000 INTRAVENOUS SOLUTION INTRAVENOUS ONCE
Status: COMPLETED | OUTPATIENT
Start: 2018-03-09 | End: 2018-03-09

## 2018-03-09 RX ORDER — SODIUM CHLORIDE 9 MG/ML
INJECTION, SOLUTION INTRAVENOUS CONTINUOUS
Status: DISCONTINUED | OUTPATIENT
Start: 2018-03-09 | End: 2018-03-12

## 2018-03-09 RX ADMIN — CEFTRIAXONE SODIUM: 2 INJECTION, POWDER, FOR SOLUTION INTRAMUSCULAR; INTRAVENOUS at 18:38

## 2018-03-09 RX ADMIN — FINASTERIDE 5 MG: 5 TABLET, FILM COATED ORAL at 11:00

## 2018-03-09 RX ADMIN — Medication 10 ML: at 19:12

## 2018-03-09 RX ADMIN — TAMSULOSIN HYDROCHLORIDE 0.4 MG: 0.4 CAPSULE ORAL at 11:01

## 2018-03-09 RX ADMIN — ROPINIROLE HYDROCHLORIDE 2 MG: 2 TABLET, FILM COATED ORAL at 19:11

## 2018-03-09 RX ADMIN — RISPERIDONE 0.5 MG: 0.5 TABLET, FILM COATED ORAL at 19:11

## 2018-03-09 RX ADMIN — SIMVASTATIN 20 MG: 20 TABLET, FILM COATED ORAL at 19:11

## 2018-03-09 RX ADMIN — Medication 10 ML: at 11:07

## 2018-03-09 RX ADMIN — SODIUM CHLORIDE 1000 ML: 9 INJECTION, SOLUTION INTRAVENOUS at 08:13

## 2018-03-09 RX ADMIN — SODIUM CHLORIDE: 9 INJECTION, SOLUTION INTRAVENOUS at 03:30

## 2018-03-09 RX ADMIN — SODIUM CHLORIDE 500 ML: 9 INJECTION, SOLUTION INTRAVENOUS at 01:27

## 2018-03-09 RX ADMIN — RISPERIDONE 0.5 MG: 0.5 TABLET, FILM COATED ORAL at 11:01

## 2018-03-09 RX ADMIN — INSULIN LISPRO 3 UNITS: 100 INJECTION, SOLUTION INTRAVENOUS; SUBCUTANEOUS at 17:43

## 2018-03-09 RX ADMIN — INSULIN LISPRO 12 UNITS: 100 INJECTION, SOLUTION INTRAVENOUS; SUBCUTANEOUS at 22:58

## 2018-03-09 RX ADMIN — ENOXAPARIN SODIUM 40 MG: 40 INJECTION SUBCUTANEOUS at 11:01

## 2018-03-09 RX ADMIN — Medication 40 UNITS: at 22:59

## 2018-03-09 NOTE — PROGRESS NOTES
He is doing about the same, no change in his mental status, asking about his parents and his wife though has no idea what's going on with him, where he is or what brought him here. Affect-Superficial  Mood -  Agitated and labile  Thought process -  Disorganized showing looseness of association and tangentiality. Reality testing-Impaired  Cognition -  Impaired  Memory- Recent-Impaired                Remote-Impaired  Orientation-Disoriented                                              Insight-Poor  Judgement-Poor     Encouraged to participate and interact with peers. Attempted to develop insight. Medications reviewed. Side effects of medications reviewed and medication education provided. No side effects including akathisia,tremers,dystonia or orthostasis reported or observed. Plan: Stabilization with antipsychotic and mood stabilization medications, supportive therapy and improve reality orientation.

## 2018-03-09 NOTE — PROGRESS NOTES
within reach, Gait belt, Left in bed, Sitter present     Therapy Time   Individual Concurrent Group Co-treatment   Time In [address]         Time Out 1300         Minutes Brendan Sapp

## 2018-03-09 NOTE — PROGRESS NOTES
Pulmonary Progress Note  [facility]      Patient - Vanessa Lincoln,  Age - [de-identified] y.o.    - 1940      Room Number - 2099/2099-01   MRN -  [id number]   Acct # - [de-identified]  Date of Admission -  2018  2:48 PM        Consulting [address] [place] [address],[address], MD  Primary Care Physician - Everett Hernandez DO     SUBJECTIVE   Remains stable pulm wise but is confused  Now in progressive. No resp distress    OBJECTIVE   VITALS    height is 5' 7\" (1.702 m) and weight is 191 lb 2.2 oz (86.7 kg). His axillary temperature is 97.6 °F (36.4 °C). His blood pressure is 98/61 and his pulse is 116. His respiration is 18 and oxygen saturation is 95%. Body mass index is 29.94 kg/m². Temperature Range: Temp: 97.6 °F (36.4 °C) Temp  Av.9 °F (36.6 °C)  Min: 97.4 °F (36.3 °C)  Max: 98.4 °F (36.9 °C)  BP Range:  Systolic (73FZC), CFP:740 , Min:98 , SIE:667     Diastolic (49FSU), ZRF:08, Min:59, Max:90    Pulse Range: Pulse  Av.3  Min: 88  Max: 116  Respiration Range: Resp  Av  Min: 16  Max: 20  Current Pulse Ox[de-identified]  SpO2: 95 %  24HR Pulse Ox Range:  SpO2  Av.4 %  Min: 90 %  Max: 96 %  Oxygen Amount and Delivery: O2 Flow Rate (L/min): 2 L/min    Wt Readings from Last 3 Encounters:   18 191 lb 2.2 oz (86.7 kg)   17 194 lb 9.6 oz (88.3 kg)   17 180 lb (81.6 kg)       I/O (24 Hours)    Intake/Output Summary (Last 24 hours) at 18  Last data filed at 18 1714   Gross per 24 hour   Intake              260 ml   Output              500 ml   Net             -240 ml       EXAM     General Appearance  Awake, alert, oriented, in no acute distress  HEENT - normocephalic, atraumatic. Neck - Supple,  trachea midline   Lungs - coarse  Heart Exam:PMI normal. No lifts, heaves, or thrills. RRR.  No murmurs, clicks, gallops, or rubs  Abdomen Exam: Abdomen soft, non-tender  Extremity Exam: no edema    MEDS      IVPB builder   Intravenous Q24H

## 2018-03-09 NOTE — PLAN OF CARE
Problem: Falls - Risk of  Goal: Absence of falls  Outcome: Ongoing  Pt instructed on how to use call light. 1:1 sitter with patient uses call light with patient when needed. Bed is in lowest and locked position. Non-slip socks in place. No falls noted this shift. Problem: Pain:  Goal: Control of acute pain  Control of acute pain   Outcome: Ongoing  Pt did not complain of pain during this shift. Problem: Skin Integrity:  Goal: Absence of new skin breakdown  Absence of new skin breakdown   Outcome: Ongoing  Pt turning self throughout the night. No new skin breakdown noted this shift.

## 2018-03-09 NOTE — PROGRESS NOTES
Dr. Porter Friend at bedside. MD assessed patient and was updated on last BP. MD states will review meds. Updated MD that lopressor and lisinopril were held due to hypotension. MD states to continue to watch patient as he is asymptomatic.

## 2018-03-09 NOTE — PROGRESS NOTES
Infectious disease Consult Note      Patient: Jeremie Reddy  : 1940  Acct#:  [id number]     Date:  3/9/2018    Subjective:       History of Present Illness  Patient is a [de-identified] y.o.  male admitted with Sepsis ([facility] [A41.9] who is seen in consult for the same . The patient was brought to the ER after a fall at the bathroom s ,body ache ,fever and chills ,chronic cough and SOB, not feeling well    Blood cultures on admission grew MSSA . Lactic acid was elevated initially ,BP stable ,CT chest showed  Opacity in the right posterolateral trachea ,no PE. Echocardiogram showed no vegetation . LUIS no reported vegetations . MEDLINE was placed  . Blood cultures  negative  Today   He is confused ,no reported fever . Past Medical History:   Diagnosis Date    Abdominal aortic aneurysm (AAA) ([facility]     small, post endovascular repair    Alveolar hypoventilation     on oxygen    Asthma     BCC (basal cell carcinoma of skin)     Bladder cancer (HCC)     BPH (benign prostatic hyperplasia)     Cataracts, bilateral     hx of    Chronic constipation     Chronic cor pulmonale (HCC)     on nocturnal oxygen    Chronic hypoxemic respiratory failure (HCC)     COPD (chronic obstructive pulmonary disease) (HCC)     stage II    Depression     Diverticulitis     GERD (gastroesophageal reflux disease)     Hard of hearing     both ears    History of kidney stones     passed on own years ago    History of nasal obstruction     History of smoking     Hoarseness     multifactorial    Hyperlipidemia     Hypertension     Hypertrophy of nasal turbinates     Irritable bowel     MDRO (multiple drug resistant organisms) resistance 2015    MRSA?  Mild obstructive sleep apnea     On supplemental oxygen therapy     2 liters per minute at night, patient does not use during the day, but supposed to use at 2 liters per minute.    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, idiopathic    Restless legs syndrome     controlled with Requip    SOB (shortness of breath)     Type II or unspecified type diabetes mellitus without mention of complication, not stated as uncontrolled     controlled on oral agents    Wears glasses     for reading      Past Surgical History:   Procedure Laterality Date    ABDOMINAL AORTIC ANEURYSM REPAIR  06/26/2013    BACK SURGERY      tumor    BLADDER SURGERY      bx    CATARACT REMOVAL Right     CHOLECYSTECTOMY      CYST REMOVAL      buttocks, benign    CYSTOSCOPY W BIOPSY OF BLADDER N/A 4/28/2017    CYSTOSCOPY BLADDER BIOPSY FULGERATION performed by Tima Sarabia MD at [address] [place] [address] Left 12/2015    HERNIA REPAIR      Inguinal herniorrhaphy    KIDNEY STONE SURGERY      extraction of kidney stones    LARYNGOSCOPY      NOSE SURGERY      SKIN BIOPSY  2008    back---bcc    SKIN CANCER EXCISION Right 10/18/2016    basal cell rt. cheek    SMALL INTESTINE SURGERY      \"a long time ago, i had 16 inches removed\"          Admission Meds  No current facility-administered medications on file prior to encounter. Current Outpatient Prescriptions on File Prior to Encounter   Medication Sig Dispense Refill    finasteride (PROSCAR) 5 MG tablet TAKE 1 TABLET BY MOUTH DAILY 90 tablet 3    rOPINIRole (REQUIP) 2 MG tablet TAKE ONE TABLET BY MOUTH EVERY EVENING AS DIRECTED 90 tablet 1    lovastatin (MEVACOR) 40 MG tablet TAKE 1 TABLET BY MOUTH NIGHTLY 90 tablet 1    PROAIR  (90 BASE) MCG/ACT inhaler INHALE 2 PUFFS USING INHALER EVERY 4 HOURS AS NEEDED 6 Inhaler 3    fluticasone-salmeterol (ADVAIR) 250-50 MCG/DOSE AEPB Inhale 1 puff into the lungs every 12 hours.  (Patient taking differently: Inhale 1 puff into the lungs every 12 hours as needed ) 60 each 5    quinapril (ACCUPRIL) 40 MG tablet Take 1 tablet by mouth daily 90 tablet 3    B-D UF III MINI PEN NEEDLES

## 2018-03-10 LAB
ABSOLUTE EOS #: 0.2 K/UL (ref 0–0.4)
ABSOLUTE IMMATURE GRANULOCYTE: ABNORMAL K/UL (ref 0–0.3)
ABSOLUTE LYMPH #: 1.3 K/UL (ref 1–4.8)
ABSOLUTE MONO #: 0.6 K/UL (ref 0.1–1.3)
ANION GAP SERPL CALCULATED.3IONS-SCNC: 12 MMOL/L (ref 9–17)
BASOPHILS # BLD: 0 % (ref 0–2)
BASOPHILS ABSOLUTE: 0 K/UL (ref 0–0.2)
BUN BLDV-MCNC: 23 MG/DL (ref 8–23)
BUN/CREAT BLD: ABNORMAL (ref 9–20)
CALCIUM SERPL-MCNC: 8.7 MG/DL (ref 8.6–10.4)
CHLORIDE BLD-SCNC: 104 MMOL/L (ref 98–107)
CO2: 25 MMOL/L (ref 20–31)
CREAT SERPL-MCNC: 0.81 MG/DL (ref 0.7–1.2)
DIFFERENTIAL TYPE: ABNORMAL
EOSINOPHILS RELATIVE PERCENT: 2 % (ref 0–4)
GFR AFRICAN AMERICAN: >60 ML/MIN
GFR NON-AFRICAN AMERICAN: >60 ML/MIN
GFR SERPL CREATININE-BSD FRML MDRD: ABNORMAL ML/MIN/{1.73_M2}
GFR SERPL CREATININE-BSD FRML MDRD: ABNORMAL ML/MIN/{1.73_M2}
GLUCOSE BLD-MCNC: 104 MG/DL (ref 70–99)
GLUCOSE BLD-MCNC: 112 MG/DL (ref 75–110)
GLUCOSE BLD-MCNC: 161 MG/DL (ref 75–110)
GLUCOSE BLD-MCNC: 183 MG/DL (ref 75–110)
GLUCOSE BLD-MCNC: 245 MG/DL (ref 75–110)
HCT VFR BLD CALC: 36.6 % (ref 41–53)
HEMOGLOBIN: 12.3 G/DL (ref 13.5–17.5)
IMMATURE GRANULOCYTES: ABNORMAL %
LYMPHOCYTES # BLD: 14 % (ref 24–44)
MCH RBC QN AUTO: 33.6 PG (ref 26–34)
MCHC RBC AUTO-ENTMCNC: 33.8 G/DL (ref 31–37)
MCV RBC AUTO: 99.5 FL (ref 80–100)
MONOCYTES # BLD: 6 % (ref 1–7)
NRBC AUTOMATED: ABNORMAL PER 100 WBC
PDW BLD-RTO: 13.9 % (ref 11.5–14.9)
PLATELET # BLD: 132 K/UL (ref 150–450)
PLATELET ESTIMATE: ABNORMAL
PMV BLD AUTO: 8.7 FL (ref 6–12)
POTASSIUM SERPL-SCNC: 4.1 MMOL/L (ref 3.7–5.3)
RBC # BLD: 3.67 M/UL (ref 4.5–5.9)
RBC # BLD: ABNORMAL 10*6/UL
SEG NEUTROPHILS: 78 % (ref 36–66)
SEGMENTED NEUTROPHILS ABSOLUTE COUNT: 7.1 K/UL (ref 1.3–9.1)
SODIUM BLD-SCNC: 141 MMOL/L (ref 135–144)
WBC # BLD: 9.1 K/UL (ref 3.5–11)
WBC # BLD: ABNORMAL 10*3/UL

## 2018-03-10 PROCEDURE — 99232 SBSQ HOSP IP/OBS MODERATE 35: CPT | Performed by: INTERNAL MEDICINE

## 2018-03-10 PROCEDURE — 6360000002 HC RX W HCPCS: Performed by: STUDENT IN AN ORGANIZED HEALTH CARE EDUCATION/TRAINING PROGRAM

## 2018-03-10 PROCEDURE — 80048 BASIC METABOLIC PNL TOTAL CA: CPT

## 2018-03-10 PROCEDURE — 6370000000 HC RX 637 (ALT 250 FOR IP): Performed by: INTERNAL MEDICINE

## 2018-03-10 PROCEDURE — 6370000000 HC RX 637 (ALT 250 FOR IP): Performed by: STUDENT IN AN ORGANIZED HEALTH CARE EDUCATION/TRAINING PROGRAM

## 2018-03-10 PROCEDURE — 85025 COMPLETE CBC W/AUTO DIFF WBC: CPT

## 2018-03-10 PROCEDURE — 6360000002 HC RX W HCPCS: Performed by: INTERNAL MEDICINE

## 2018-03-10 PROCEDURE — 97110 THERAPEUTIC EXERCISES: CPT

## 2018-03-10 PROCEDURE — 2580000003 HC RX 258: Performed by: STUDENT IN AN ORGANIZED HEALTH CARE EDUCATION/TRAINING PROGRAM

## 2018-03-10 PROCEDURE — 6370000000 HC RX 637 (ALT 250 FOR IP): Performed by: NURSE PRACTITIONER

## 2018-03-10 PROCEDURE — 2580000003 HC RX 258: Performed by: INTERNAL MEDICINE

## 2018-03-10 PROCEDURE — 36592 COLLECT BLOOD FROM PICC: CPT

## 2018-03-10 PROCEDURE — 36415 COLL VENOUS BLD VENIPUNCTURE: CPT

## 2018-03-10 PROCEDURE — 6370000000 HC RX 637 (ALT 250 FOR IP): Performed by: RADIOLOGY

## 2018-03-10 PROCEDURE — 82947 ASSAY GLUCOSE BLOOD QUANT: CPT

## 2018-03-10 PROCEDURE — 2060000000 HC ICU INTERMEDIATE R&B

## 2018-03-10 PROCEDURE — 6370000000 HC RX 637 (ALT 250 FOR IP): Performed by: PSYCHIATRY & NEUROLOGY

## 2018-03-10 RX ADMIN — ROPINIROLE HYDROCHLORIDE 2 MG: 2 TABLET, FILM COATED ORAL at 21:23

## 2018-03-10 RX ADMIN — SIMVASTATIN 20 MG: 20 TABLET, FILM COATED ORAL at 20:30

## 2018-03-10 RX ADMIN — SODIUM CHLORIDE: 9 INJECTION, SOLUTION INTRAVENOUS at 00:02

## 2018-03-10 RX ADMIN — Medication 40 UNITS: at 20:34

## 2018-03-10 RX ADMIN — INSULIN LISPRO 6 UNITS: 100 INJECTION, SOLUTION INTRAVENOUS; SUBCUTANEOUS at 22:00

## 2018-03-10 RX ADMIN — SODIUM CHLORIDE: 9 INJECTION, SOLUTION INTRAVENOUS at 21:00

## 2018-03-10 RX ADMIN — SODIUM CHLORIDE: 9 INJECTION, SOLUTION INTRAVENOUS at 10:06

## 2018-03-10 RX ADMIN — METOPROLOL TARTRATE 12.5 MG: 25 TABLET ORAL at 08:43

## 2018-03-10 RX ADMIN — RISPERIDONE 0.5 MG: 0.5 TABLET, FILM COATED ORAL at 20:30

## 2018-03-10 RX ADMIN — ACETAMINOPHEN 650 MG: 325 TABLET, FILM COATED ORAL at 10:13

## 2018-03-10 RX ADMIN — Medication 10 ML: at 08:44

## 2018-03-10 RX ADMIN — ENOXAPARIN SODIUM 40 MG: 40 INJECTION SUBCUTANEOUS at 08:43

## 2018-03-10 RX ADMIN — RISPERIDONE 0.5 MG: 0.5 TABLET, FILM COATED ORAL at 08:43

## 2018-03-10 RX ADMIN — METOPROLOL TARTRATE 12.5 MG: 25 TABLET ORAL at 20:30

## 2018-03-10 RX ADMIN — INSULIN LISPRO 3 UNITS: 100 INJECTION, SOLUTION INTRAVENOUS; SUBCUTANEOUS at 12:00

## 2018-03-10 RX ADMIN — CEFTRIAXONE SODIUM: 2 INJECTION, POWDER, FOR SOLUTION INTRAMUSCULAR; INTRAVENOUS at 17:36

## 2018-03-10 RX ADMIN — INSULIN LISPRO 3 UNITS: 100 INJECTION, SOLUTION INTRAVENOUS; SUBCUTANEOUS at 17:01

## 2018-03-10 RX ADMIN — ACETAMINOPHEN 650 MG: 325 TABLET, FILM COATED ORAL at 23:16

## 2018-03-10 RX ADMIN — FINASTERIDE 5 MG: 5 TABLET, FILM COATED ORAL at 08:43

## 2018-03-10 ASSESSMENT — PAIN DESCRIPTION - LOCATION: LOCATION: BACK

## 2018-03-10 ASSESSMENT — ENCOUNTER SYMPTOMS
ABDOMINAL PAIN: 0
NAUSEA: 0
VOMITING: 0
SHORTNESS OF BREATH: 0
DOUBLE VISION: 0
BLURRED VISION: 0
BACK PAIN: 1

## 2018-03-10 ASSESSMENT — PAIN SCALES - GENERAL
PAINLEVEL_OUTOF10: 7
PAINLEVEL_OUTOF10: 4
PAINLEVEL_OUTOF10: 7
PAINLEVEL_OUTOF10: 8
PAINLEVEL_OUTOF10: 0
PAINLEVEL_OUTOF10: 8
PAINLEVEL_OUTOF10: 9
PAINLEVEL_OUTOF10: 0

## 2018-03-10 ASSESSMENT — PAIN DESCRIPTION - PAIN TYPE: TYPE: CHRONIC PAIN

## 2018-03-10 NOTE — PROGRESS NOTES
[facility]    Progress Note    3/10/2018    1:45 PM    Name:   Genie Whiting  MRN:     [id number]     Acct:      [de-identified]   Room:   2099/2099-01   Day:  16  Admit Date:  2/21/2018  2:48 PM    PCP:   Luh Bhardwaj DO  Code Status:  Full Code    Subjective:     C/C:   Chief Complaint   Patient presents with    Shortness of Breath     Interval History Status: improved. Patient seen and assessed at bedside. No respiratory complaints on NC this morning. C/o of pain at the right lower chest and low back but does not want to take medication. CXR no mention of rib fracture but possible T6 vertebral height loss. Denies fevers/chills, SOB, CP. Worked with PT/OT today, tolerating PO. Awaiting precert for Vazquez Diez. less agitated   bp better   cont ivf     no focal deficits   no infection  Brief History:     The patient is a 76 y.o. Non-/non  male who presents with Shortness of Breath and he is admitted to the hospital for the management of SOB and lightheadedness. Hx COPD, DM II, HTN, HLD; hx nephrolithiasis and bladder cysts with chronic UTIs per family. Per patient felt lightheaded while using the bathroom, dizziness without syncope, fell from toilet today sdfand was unable to get up. No LOC. Low fever and chills for past 1-2 days. Rhinorrhea and congestion. Nausea without emesis today. Chronic SOB, on 2L home O2 daytime with exercise and nighttime, but patient only using at nighttime. Baseline sputum production, usually unable to bring up. No chest pain. No abd pain. No flank pain. Per patient no change in fluid intake or urination, but per family patient not keeping hydrated. No diarrhea/constipation. Review of Systems:     Review of Systems   Constitutional: Negative for chills and fever. Eyes: Negative for blurred vision and double vision. Respiratory: Negative for shortness of breath.     Cardiovascular: Negative for chest pain. Gastrointestinal: Negative for abdominal pain, nausea and vomiting. Genitourinary: Negative for dysuria. Musculoskeletal: Positive for back pain (paraspinal lumbar, no vertebral body tenderness, as described above). Pain right lower chest s/p fall   Neurological: Negative for headaches. Medications: Allergies: Allergies   Allergen Reactions    Ativan [Lorazepam]      hallucinations    Codeine      Cough syrup gi upset       Current Meds:   Scheduled Meds:    metoprolol tartrate  12.5 mg Oral BID    IVPB builder   Intravenous Q24H    risperiDONE  1 mg Oral Once    risperiDONE  0.5 mg Oral BID    insulin glargine  40 Units Subcutaneous Nightly    insulin lispro  0-18 Units Subcutaneous 4x Daily AC & HS    rOPINIRole  2 mg Oral Nightly    sodium chloride flush  10 mL Intravenous 2 times per day    enoxaparin  40 mg Subcutaneous Daily    finasteride  5 mg Oral Daily    simvastatin  20 mg Oral Nightly     Continuous Infusions:    sodium chloride 150 mL/hr at 03/10/18 1006    dextrose       PRN Meds: OLANZapine **AND** sterile water, ipratropium-albuterol, hydrALAZINE, ondansetron, albuterol sulfate HFA, sodium chloride flush, acetaminophen, sodium phosphate IVPB **OR** sodium phosphate IVPB, potassium chloride **OR** potassium chloride **OR** potassium chloride, magnesium sulfate, glucose, dextrose, glucagon (rDNA), dextrose, albuterol    Data:     Past Medical History:   has a past medical history of Abdominal aortic aneurysm (AAA) ([facility] Alveolar hypoventilation; Asthma; BCC (basal cell carcinoma of skin); Bladder cancer [facility]); BPH (benign prostatic hyperplasia); Cataracts, bilateral; Chronic constipation; [facility] ([facility] Chronic hypoxemic respiratory failure (HCC); COPD (chronic obstructive pulmonary disease) ([facility] Depression; Diverticulitis; GERD (gastroesophageal reflux disease); Hard of hearing; History of kidney stones;  History of nasal obstruction; History of smoking; Hoarseness; Hyperlipidemia; Hypertension; Hypertrophy of nasal turbinates; Irritable bowel; MDRO (multiple drug resistant organisms) resistance; Mild obstructive sleep apnea; On supplemental oxygen therapy; Osteoarthritis; Paralysis of vocal cords; Restless legs syndrome; SOB (shortness of breath); Type II or unspecified type diabetes mellitus without mention of complication, not stated as uncontrolled; and Wears glasses. Social History:   reports that he quit smoking about 2 years ago. His smoking use included Cigarettes. He has a 100.00 pack-year smoking history. He has never used smokeless tobacco. He reports that he does not drink alcohol or use drugs. Family History:   Family History   Problem Relation Age of Onset    Diabetes Mother     Other Other      Testicular rhabomyosarcoma        Vitals:  /61   Pulse 86   Temp 97.5 °F (36.4 °C) (Axillary)   Resp 18   Ht 5' 7\" (1.702 m)   Wt 179 lb 10.8 oz (81.5 kg)   SpO2 95%   BMI 28.14 kg/m²    Temp (24hrs), Av.2 °F (36.8 °C), Min:97.2 °F (36.2 °C), Max:98.7 °F (37.1 °C)    Recent Labs      18   1629  18   1959  03/10/18   0816  03/10/18   1110   POCGLU  184*  319*  112*  183*       I/O (24Hr): Intake/Output Summary (Last 24 hours) at 03/10/18 1345  Last data filed at 03/10/18 0531   Gross per 24 hour   Intake           2533.7 ml   Output              600 ml   Net           1933.7 ml       Labs:    [unfilled]    Lab Results   Component Value Date/Time    SPECIAL  2018 09:32 AM     RIGHT HAND, 1mL ANAEROBIC, 5mL AEROBIC Performed at [facility]  2018 09:32 AM      Hospital [address] [place] [address] [phone]     Lab Results   Component Value Date/Time    CULTURE NO GROWTH 6 DAYS 2018 09:32 AM    CULTURE  2018 09:32 AM     Performed at [address] [place] [address] [phone] sitting in chair comfortable, in no distress, on NC   HENT:   Head: Normocephalic and atraumatic. Eyes: Right eye exhibits no discharge. Left eye exhibits no discharge. Cardiovascular: Intact distal pulses. Murmur (systolic) heard. Pulmonary/Chest: Effort normal. No respiratory distress. He has no wheezes. He exhibits tenderness (right lower chest from fall). Abdominal: Soft. He exhibits no distension. Musculoskeletal: He exhibits tenderness (lumbar pain). He exhibits no edema. Neurological: He is alert and oriented to person, place, and time. Skin: Skin is warm and dry. He is not diaphoretic. Assessment:        Primary Problem  Sepsis [facility])    Active Hospital Problems    Diagnosis Date Noted    Bacteremia [R78.81]     Thrombocytopenia ([facility] [D69.6] 02/22/2018    [facility] [A41.9] 02/21/2018    Diabetes mellitus type 2, insulin dependent ([facility] [E11.9, Z79.4] 02/21/2018    Hypertension [I10]     Hyperlipidemia [E78.5]     COPD (chronic obstructive pulmonary disease) ([facility] [J44.9]        Plan:         MSSA septicemia 2/2 pneumonia - resolved  - 2 sets BCx + MSSA; third set blood cultures (2/26) NGTD 4 days  - LUIS without evidence of vegetations  - PICC placement for IV rocephin til 3/10 per ID recs  - awaiting precert    Acute on chronic hypoxic & hypercapneic respiratory failure, resolving  - COPD on 3L home O2  - LLB airspace disease with partial clearing  - cont rocephin    HTN - overcorrected  - decreased lisinopril to 10 mg    DM II - hyperglycemic  - inc Lantus 32 QHS  - high dose ISS  - Hypoglycemia protocol; POCT glucose x4  - f/u A1C    Thrombocytopenia, chronic  - HCV negative  - Steady 90-110s, monitor    S/p fall x2  - Patient lightheaded, per family poor fluid intake  - No LOC, no head injury  - PT/OT    BPH  - cont Flomax, Proscar.      resolved sepsis   agitation   dementia nwith ac dilirium   respirdol added    bp low poor intake no uti   I=vcf bolus gived bp now ok decrease ivf 75     d/c planning awaiting pre [place] FRANCIS Kolb MD  3/10/2018  1:45 PM

## 2018-03-10 NOTE — PLAN OF CARE
Problem: Falls - Risk of  Intervention: Fall risk assessment  Assessed this shift  Intervention: Postfall assessment  N/A  Intervention: Fall precautions  Sitter present in room at this time, falling star at pt entrance, chair/bed alarm at all times, call light within reach, bed wheels locked  Intervention: Toileting assistance  Assisted by staff, reminded to ask for help      Problem: OXYGENATION/RESPIRATORY FUNCTION  Goal: Patient will maintain patent airway    Intervention: POSITION PATIENT FOR MAXIMUM VENTILATORY EFFICIENCY  Pt sitting up in chair  Intervention: [address]  Lung sounds assessed with head-to-toe  Intervention: PROVIDE ADEQUATE FLUID INTAKE TO LIQUIFY SECRETIONS  Fluid provided to pt.  Reminded to keep drinking fluids, IV NS continues   Intervention: MONITOR INTAKE AND OUTPUT  Monitoring I & O every shift    Intervention: SUCTION SECRETIONS AS INDICATED TO MAINTAIN PATENT AIRWAY  Encouraged to cough and deep breath  Intervention: INSTRUCT PATIENT TO TURN, COUGH AND DEEP BREATHE  encouraged  Intervention: ENCOURAGE INCENTIVE SPIROMETRY  encouraged

## 2018-03-10 NOTE — PROGRESS NOTES
Lab Results   Component Value Date    PHOS 2.1 02/26/2018        LIVER PROFILE   Recent Labs      03/07/18   1510   AST  38   ALT  47*   BILIDIR  0.34*   BILITOT  0.96   ALKPHOS  86     INR No results for input(s): INR in the last 72 hours. PTT   Lab Results   Component Value Date    APTT 30.3 02/23/2018         RADIOLOGY     (See actual reports for details)    ASSESSMENT/PLAN   Principal Problem:    Sepsis ([facility]  Active Problems:    COPD (chronic obstructive pulmonary disease) ([facility]    Hyperlipidemia    Hypertension    Diabetes mellitus type 2, insulin dependent (HCC)    Thrombocytopenia ([facility]    Bacteremia  Resolved Problems:    * No resolved hospital problems.  *    Plan  Continue IV antibiotics  Plan transfer ECF  Will follow loosely  Electronically signed by Karyn Nayak MD on 3/10/2018 at 12:43 PM

## 2018-03-10 NOTE — PROGRESS NOTES
Physical Therapy  Facility/Department: [facility] PROGRESSIVE CARE  Daily Treatment Note  NAME: Olvin French  : 1940  MRN: [id number]    Date of Service: 3/10/2018    Patient Diagnosis(es):   Patient Active Problem List    Diagnosis Date Noted    Bacteremia     Thrombocytopenia ([facility] 2018    [facility]    Diabetes mellitus type 2, insulin dependent ([facility] 2018    Tobacco use 2015    History of bladder cancer 2014    COPD (chronic obstructive pulmonary disease) ([facility]     Diabetes 1.5, managed as type 2 ([facility]     Hyperlipidemia     Hypertension      Past Medical History:   Diagnosis Date    Abdominal aortic aneurysm (AAA) ([facility]     small, post endovascular repair    Alveolar hypoventilation     on oxygen    Asthma     BCC (basal cell carcinoma of skin)     Bladder cancer (HCC)     BPH (benign prostatic hyperplasia)     Cataracts, bilateral     hx of    Chronic constipation     Chronic cor pulmonale (HCC)     on nocturnal oxygen    Chronic hypoxemic respiratory failure (HCC)     COPD (chronic obstructive pulmonary disease) (HCC)     stage II    Depression     Diverticulitis     GERD (gastroesophageal reflux disease)     Hard of hearing     both ears    History of kidney stones     passed on own years ago    History of nasal obstruction     History of smoking     Hoarseness     multifactorial    Hyperlipidemia     Hypertension     Hypertrophy of nasal turbinates     Irritable bowel     MDRO (multiple drug resistant organisms) resistance 2015    MRSA?  Mild obstructive sleep apnea     On supplemental oxygen therapy     2 liters per minute at night, patient does not use during the day, but supposed to use at 2 liters per minute.     Osteoarthritis     Paralysis of vocal cords     Paralyzed left vocal cord, idiopathic    Restless legs syndrome     controlled with Requip    SOB (shortness of breath)     Type II or unspecified type diabetes Dynamic: Fair;+     Exercises  Comments: bilateral UE and LE exercises sitting and standing      Assessment   Body structures, Functions, Activity limitations: Decreased endurance;Decreased functional mobility   Prognosis: Good  REQUIRES PT FOLLOW UP: Yes  Activity Tolerance  Activity Tolerance: Patient Tolerated treatment well     Discharge Recommendations:  Subacute/Skilled Nursing Facility  Goals  Short term goals  Time Frame for Short term goals: 10 visits  Short term goal 1: Improve strength in both LE to 4+/5  Short term goal 2: Independent in sit to stand  Short term goal 3: Independent in ambulation 40 feet with a rolling walker.   Short term goal 4: Improve standing balance to good  Long term goals  Time Frame for Long term goals : 8 sessions  Long term goal 1: Independent ambulation 120 feet with rolling walker  Patient Goals   Patient goals : Return home    Plan    Plan  Times per week: 6-7x/week  Times per day: Daily  Plan weeks: 2 weeks  Specific instructions for Next Treatment: ther exs and ther activities as tolerated  Current Treatment Recommendations: Strengthening, Balance Training, Functional Mobility Training, Transfer Training, Gait Training  Safety Devices  Type of devices: Gait belt, Sitter present (patient left on toilet sitter in room to assist when patient ready)     Therapy Time   Individual Concurrent Group Co-treatment   Time In [address]         Time Out 0933         Minutes [address], PT

## 2018-03-11 LAB
ABSOLUTE EOS #: 0.2 K/UL (ref 0–0.4)
ABSOLUTE IMMATURE GRANULOCYTE: ABNORMAL K/UL (ref 0–0.3)
ABSOLUTE LYMPH #: 1 K/UL (ref 1–4.8)
ABSOLUTE MONO #: 0.5 K/UL (ref 0.1–1.3)
ANION GAP SERPL CALCULATED.3IONS-SCNC: 10 MMOL/L (ref 9–17)
BASOPHILS # BLD: 0 % (ref 0–2)
BASOPHILS ABSOLUTE: 0 K/UL (ref 0–0.2)
BUN BLDV-MCNC: 18 MG/DL (ref 8–23)
BUN/CREAT BLD: ABNORMAL (ref 9–20)
CALCIUM SERPL-MCNC: 8.6 MG/DL (ref 8.6–10.4)
CHLORIDE BLD-SCNC: 103 MMOL/L (ref 98–107)
CO2: 29 MMOL/L (ref 20–31)
CREAT SERPL-MCNC: 0.8 MG/DL (ref 0.7–1.2)
DIFFERENTIAL TYPE: ABNORMAL
EOSINOPHILS RELATIVE PERCENT: 3 % (ref 0–4)
GFR AFRICAN AMERICAN: >60 ML/MIN
GFR NON-AFRICAN AMERICAN: >60 ML/MIN
GFR SERPL CREATININE-BSD FRML MDRD: ABNORMAL ML/MIN/{1.73_M2}
GFR SERPL CREATININE-BSD FRML MDRD: ABNORMAL ML/MIN/{1.73_M2}
GLUCOSE BLD-MCNC: 142 MG/DL (ref 75–110)
GLUCOSE BLD-MCNC: 154 MG/DL (ref 75–110)
GLUCOSE BLD-MCNC: 162 MG/DL (ref 75–110)
GLUCOSE BLD-MCNC: 210 MG/DL (ref 70–99)
GLUCOSE BLD-MCNC: 235 MG/DL (ref 75–110)
HCT VFR BLD CALC: 36.3 % (ref 41–53)
HEMOGLOBIN: 12.3 G/DL (ref 13.5–17.5)
IMMATURE GRANULOCYTES: ABNORMAL %
LYMPHOCYTES # BLD: 16 % (ref 24–44)
MCH RBC QN AUTO: 33.4 PG (ref 26–34)
MCHC RBC AUTO-ENTMCNC: 33.8 G/DL (ref 31–37)
MCV RBC AUTO: 98.8 FL (ref 80–100)
MONOCYTES # BLD: 7 % (ref 1–7)
NRBC AUTOMATED: ABNORMAL PER 100 WBC
PDW BLD-RTO: 13.5 % (ref 11.5–14.9)
PLATELET # BLD: 133 K/UL (ref 150–450)
PLATELET ESTIMATE: ABNORMAL
PMV BLD AUTO: 8.5 FL (ref 6–12)
POTASSIUM SERPL-SCNC: 4.3 MMOL/L (ref 3.7–5.3)
RBC # BLD: 3.68 M/UL (ref 4.5–5.9)
RBC # BLD: ABNORMAL 10*6/UL
SEG NEUTROPHILS: 74 % (ref 36–66)
SEGMENTED NEUTROPHILS ABSOLUTE COUNT: 4.8 K/UL (ref 1.3–9.1)
SODIUM BLD-SCNC: 142 MMOL/L (ref 135–144)
WBC # BLD: 6.5 K/UL (ref 3.5–11)
WBC # BLD: ABNORMAL 10*3/UL

## 2018-03-11 PROCEDURE — 99233 SBSQ HOSP IP/OBS HIGH 50: CPT | Performed by: INTERNAL MEDICINE

## 2018-03-11 PROCEDURE — 80048 BASIC METABOLIC PNL TOTAL CA: CPT

## 2018-03-11 PROCEDURE — 2060000000 HC ICU INTERMEDIATE R&B

## 2018-03-11 PROCEDURE — 85025 COMPLETE CBC W/AUTO DIFF WBC: CPT

## 2018-03-11 PROCEDURE — 2580000003 HC RX 258: Performed by: STUDENT IN AN ORGANIZED HEALTH CARE EDUCATION/TRAINING PROGRAM

## 2018-03-11 PROCEDURE — 97116 GAIT TRAINING THERAPY: CPT

## 2018-03-11 PROCEDURE — 6370000000 HC RX 637 (ALT 250 FOR IP): Performed by: INTERNAL MEDICINE

## 2018-03-11 PROCEDURE — 6370000000 HC RX 637 (ALT 250 FOR IP): Performed by: STUDENT IN AN ORGANIZED HEALTH CARE EDUCATION/TRAINING PROGRAM

## 2018-03-11 PROCEDURE — 36415 COLL VENOUS BLD VENIPUNCTURE: CPT

## 2018-03-11 PROCEDURE — 82947 ASSAY GLUCOSE BLOOD QUANT: CPT

## 2018-03-11 PROCEDURE — 97110 THERAPEUTIC EXERCISES: CPT

## 2018-03-11 PROCEDURE — 2500000003 HC RX 250 WO HCPCS: Performed by: PSYCHIATRY & NEUROLOGY

## 2018-03-11 PROCEDURE — 2580000003 HC RX 258: Performed by: PSYCHIATRY & NEUROLOGY

## 2018-03-11 PROCEDURE — 6370000000 HC RX 637 (ALT 250 FOR IP): Performed by: NURSE PRACTITIONER

## 2018-03-11 PROCEDURE — 6370000000 HC RX 637 (ALT 250 FOR IP): Performed by: RADIOLOGY

## 2018-03-11 PROCEDURE — 6360000002 HC RX W HCPCS: Performed by: STUDENT IN AN ORGANIZED HEALTH CARE EDUCATION/TRAINING PROGRAM

## 2018-03-11 PROCEDURE — 36592 COLLECT BLOOD FROM PICC: CPT

## 2018-03-11 PROCEDURE — 6370000000 HC RX 637 (ALT 250 FOR IP): Performed by: PSYCHIATRY & NEUROLOGY

## 2018-03-11 PROCEDURE — 97530 THERAPEUTIC ACTIVITIES: CPT

## 2018-03-11 PROCEDURE — 2580000003 HC RX 258: Performed by: INTERNAL MEDICINE

## 2018-03-11 PROCEDURE — 99232 SBSQ HOSP IP/OBS MODERATE 35: CPT | Performed by: INTERNAL MEDICINE

## 2018-03-11 RX ADMIN — FINASTERIDE 5 MG: 5 TABLET, FILM COATED ORAL at 09:36

## 2018-03-11 RX ADMIN — INSULIN LISPRO 6 UNITS: 100 INJECTION, SOLUTION INTRAVENOUS; SUBCUTANEOUS at 11:23

## 2018-03-11 RX ADMIN — WATER 2.1 ML: 1 INJECTION INTRAMUSCULAR; INTRAVENOUS; SUBCUTANEOUS at 16:59

## 2018-03-11 RX ADMIN — WATER 2.1 ML: 1 INJECTION INTRAMUSCULAR; INTRAVENOUS; SUBCUTANEOUS at 13:40

## 2018-03-11 RX ADMIN — INSULIN LISPRO 3 UNITS: 100 INJECTION, SOLUTION INTRAVENOUS; SUBCUTANEOUS at 17:11

## 2018-03-11 RX ADMIN — METOPROLOL TARTRATE 12.5 MG: 25 TABLET ORAL at 09:36

## 2018-03-11 RX ADMIN — OLANZAPINE 5 MG: 10 INJECTION, POWDER, FOR SOLUTION INTRAMUSCULAR at 16:59

## 2018-03-11 RX ADMIN — ROPINIROLE HYDROCHLORIDE 2 MG: 2 TABLET, FILM COATED ORAL at 22:04

## 2018-03-11 RX ADMIN — ACETAMINOPHEN 650 MG: 325 TABLET, FILM COATED ORAL at 14:27

## 2018-03-11 RX ADMIN — SODIUM CHLORIDE: 9 INJECTION, SOLUTION INTRAVENOUS at 11:22

## 2018-03-11 RX ADMIN — OLANZAPINE 5 MG: 10 INJECTION, POWDER, FOR SOLUTION INTRAMUSCULAR at 13:40

## 2018-03-11 RX ADMIN — Medication 10 ML: at 09:37

## 2018-03-11 RX ADMIN — METOPROLOL TARTRATE 12.5 MG: 25 TABLET ORAL at 22:03

## 2018-03-11 RX ADMIN — ENOXAPARIN SODIUM 40 MG: 40 INJECTION SUBCUTANEOUS at 09:37

## 2018-03-11 RX ADMIN — SIMVASTATIN 20 MG: 20 TABLET, FILM COATED ORAL at 21:50

## 2018-03-11 RX ADMIN — RISPERIDONE 0.5 MG: 0.5 TABLET, FILM COATED ORAL at 09:39

## 2018-03-11 RX ADMIN — INSULIN LISPRO 3 UNITS: 100 INJECTION, SOLUTION INTRAVENOUS; SUBCUTANEOUS at 09:41

## 2018-03-11 RX ADMIN — RISPERIDONE 0.5 MG: 0.5 TABLET, FILM COATED ORAL at 22:04

## 2018-03-11 ASSESSMENT — PAIN DESCRIPTION - PROGRESSION: CLINICAL_PROGRESSION: GRADUALLY WORSENING

## 2018-03-11 ASSESSMENT — PAIN SCALES - GENERAL
PAINLEVEL_OUTOF10: 0
PAINLEVEL_OUTOF10: 7
PAINLEVEL_OUTOF10: 7
PAINLEVEL_OUTOF10: 5

## 2018-03-11 ASSESSMENT — PAIN DESCRIPTION - FREQUENCY: FREQUENCY: CONTINUOUS

## 2018-03-11 ASSESSMENT — PAIN DESCRIPTION - LOCATION
LOCATION: BACK
LOCATION: BACK

## 2018-03-11 ASSESSMENT — PAIN DESCRIPTION - PAIN TYPE
TYPE: CHRONIC PAIN
TYPE: ACUTE PAIN

## 2018-03-11 ASSESSMENT — ENCOUNTER SYMPTOMS
VOMITING: 0
ABDOMINAL PAIN: 0
DOUBLE VISION: 0
BACK PAIN: 1
NAUSEA: 0
BLURRED VISION: 0
SHORTNESS OF BREATH: 0

## 2018-03-11 ASSESSMENT — PAIN DESCRIPTION - DESCRIPTORS: DESCRIPTORS: DISCOMFORT;SORE

## 2018-03-11 ASSESSMENT — PAIN DESCRIPTION - ORIENTATION
ORIENTATION: LOWER
ORIENTATION: LOWER

## 2018-03-11 ASSESSMENT — PAIN DESCRIPTION - ONSET: ONSET: ON-GOING

## 2018-03-11 NOTE — PROGRESS NOTES
Influenza A + B antigens. Doppler LE ,no DVT     Assessment:   Staph aureus bacteremia ,possible pulmonary source. Sepsis secondary to above resolved. Encephalopathy likely metabolic . Active Problems:    COPD (chronic obstructive pulmonary disease) (HCC)    Hyperlipidemia    Hypertension    Diabetes mellitus type 2, insulin dependent (HCC)    Thrombocytopenia ([facility]  H/o Bladder CA  Recommendations:      D/C IV Ceftriaxone . Remove midline when ok with primary  .           Carissa Botello

## 2018-03-11 NOTE — PROGRESS NOTES
Return home    Plan    Plan  Times per week: 6-7x/week  Times per day: Daily  Plan weeks: 2 weeks  Specific instructions for Next Treatment: ther exs and ther activities as tolerated  Current Treatment Recommendations: Strengthening, Balance Training, Functional Mobility Training, Transfer Training, Gait Training  Safety Devices  Type of devices: Gait belt, Sitter present, All fall risk precautions in place     Therapy Time   Individual Concurrent Group Co-treatment   Time In 1245         Time Out 1326         Minutes                   Donde, PT

## 2018-03-11 NOTE — PROGRESS NOTES
Spoke with dr Karla Rock regarding pt's increased agitation, hallucinations, combativeness verbally and physically with staff. Difficult redirecting at times. Explained use of xyprexa dr Karla Rock had ordered and no improvement. Dr Karla Rock stated with psychotic dementia this can be expected and to continue to use prn xyprexa up to three times a day. No other orders offered.

## 2018-03-11 NOTE — PROGRESS NOTES
Physical Therapy  Facility/Department: [facility] PROGRESSIVE CARE  Daily Treatment Note  NAME: Gary Escamilla  : 1940  MRN: [id number]    Date of Service: 3/11/2018    Patient Diagnosis(es):   Patient Active Problem List    Diagnosis Date Noted    Bacteremia     [facility]    Sepsis [facility]    Diabetes mellitus type 2, insulin dependent ([facility] 2018    Tobacco use 2015    History of bladder cancer 2014    COPD (chronic obstructive pulmonary disease) [facility]     Diabetes 1.5, managed as type 2 ([facility]     Hyperlipidemia     Hypertension        Past Medical History:   Diagnosis Date    Abdominal aortic aneurysm (AAA) ([facility]     small, post endovascular repair    Alveolar hypoventilation     on oxygen    Asthma     BCC (basal cell carcinoma of skin)     Bladder cancer (HCC)     BPH (benign prostatic hyperplasia)     Cataracts, bilateral     hx of    Chronic constipation     Chronic cor pulmonale (HCC)     on nocturnal oxygen    Chronic hypoxemic respiratory failure (HCC)     COPD (chronic obstructive pulmonary disease) (HCC)     stage II    Depression     Diverticulitis     GERD (gastroesophageal reflux disease)     Hard of hearing     both ears    History of kidney stones     passed on own years ago    History of nasal obstruction     History of smoking     Hoarseness     multifactorial    Hyperlipidemia     Hypertension     Hypertrophy of nasal turbinates     Irritable bowel     MDRO (multiple drug resistant organisms) resistance 2015    MRSA?  Mild obstructive sleep apnea     On supplemental oxygen therapy     2 liters per minute at night, patient does not use during the day, but supposed to use at 2 liters per minute.     Osteoarthritis     Paralysis of vocal cords     Paralyzed left vocal cord, idiopathic    Restless legs syndrome     controlled with Requip    SOB (shortness of breath)     Type II or unspecified type diabetes mellitus without mention of complication, not stated as uncontrolled     controlled on oral agents    Wears glasses     for reading     Past Surgical History:   Procedure Laterality Date    ABDOMINAL AORTIC ANEURYSM REPAIR  06/26/2013    BACK SURGERY      tumor    BLADDER SURGERY      bx    CATARACT REMOVAL Right     CHOLECYSTECTOMY      CYST REMOVAL      buttocks, benign    CYSTOSCOPY W BIOPSY OF BLADDER N/A 4/28/2017    CYSTOSCOPY BLADDER BIOPSY FULGERATION performed by Mono Gaviria MD at [facility] Left 12/2015    HERNIA REPAIR      Inguinal herniorrhaphy    KIDNEY STONE SURGERY      extraction of kidney stones    LARYNGOSCOPY      NOSE SURGERY      SKIN BIOPSY  2008    back---bcc    SKIN CANCER EXCISION Right 10/18/2016    basal cell rt. cheek    SMALL INTESTINE SURGERY      \"a long time ago, i had 16 inches removed\"       Restrictions  Restrictions/Precautions  Restrictions/Precautions: Fall Risk, Contact Precautions, Isolation, Cardiac, General Precautions  Position Activity Restriction  Other position/activity restrictions: on nc oxygen     Subjective   General  Chart Reviewed: Yes  Response To Previous Treatment: Patient with no complaints from previous session. (Doubt carryover due to cognitive status)  Family / Caregiver Present: No (1:1 sitter)  Pain Screening  Patient Currently in Pain: Yes  Pain Assessment  Pain Assessment: 0-10  Pain Level: 7  Pain Type: Acute pain  Pain Location: Back  Pain Orientation: Lower  Vital Signs  Patient Currently in Pain: Yes    Orientation  Orientation  Overall Orientation Status: Impaired  Orientation Level: Oriented to place;Oriented to person;Disoriented to situation;Disoriented to place     Objective   Bed mobility  Comment: NT-pt up in chair on arrival and at end of session.      Transfers  Sit to Stand: Minimal Assistance (Cues for proper hand placement)  Stand to sit: Minimal Assistance (Pt

## 2018-03-11 NOTE — PROGRESS NOTES
nasal obstruction; History of smoking; Hoarseness; Hyperlipidemia; Hypertension; Hypertrophy of nasal turbinates; Irritable bowel; MDRO (multiple drug resistant organisms) resistance; Mild obstructive sleep apnea; On supplemental oxygen therapy; Osteoarthritis; Paralysis of vocal cords; Restless legs syndrome; SOB (shortness of breath); Type II or unspecified type diabetes mellitus without mention of complication, not stated as uncontrolled; and Wears glasses. Social History:   reports that he quit smoking about 2 years ago. His smoking use included Cigarettes. He has a 100.00 pack-year smoking history. He has never used smokeless tobacco. He reports that he does not drink alcohol or use drugs. Family History:   Family History   Problem Relation Age of Onset    Diabetes Mother     Other Other      Testicular rhabomyosarcoma        Vitals:  /73   Pulse 93   Temp 97.9 °F (36.6 °C) (Oral)   Resp 18   Ht 5' 7\" (1.702 m)   Wt 161 lb 6 oz (73.2 kg)   SpO2 96%   BMI 25.28 kg/m²   Temp (24hrs), Av.8 °F (36.6 °C), Min:97.5 °F (36.4 °C), Max:98.1 °F (36.7 °C)    Recent Labs      03/10/18   1110  03/10/18   1616  03/10/18   2128  18   0659   POCGLU  183*  161*  245*  162*       I/O (24Hr): Intake/Output Summary (Last 24 hours) at 18 1257  Last data filed at 18 1250   Gross per 24 hour   Intake           2463.7 ml   Output                0 ml   Net           2463.7 ml       Labs:    [unfilled]    Lab Results   Component Value Date/Time    SPECIAL  2018 09:32 AM     RIGHT HAND, 1mL ANAEROBIC, 5mL AEROBIC Performed at [facility]  2018 09:32 AM      Hospital [address] [place] [address] [phone]     Lab Results   Component Value Date/Time    CULTURE NO GROWTH 6 DAYS 2018 09:32 AM    CULTURE  2018 09:32 AM     Performed at [address] [place] [address] [phone]       [facility]    Radiology:    Brandi Martinez of the bilateral adrenal glands can be seen with adrenal hyperplasia. Mild bilateral perinephric stranding. Fluid density lesions within both kidneys most likely representing renal cysts, the largest which is partially visualized at the midpole of the right kidney measuring up to 3.3 cm on image 144, series 5. Possible punctate 1 mm nonobstructing calculus on image 148, series 5. 1.3 cm fat containing right adrenal mass on image 111, series 5, consistent with a right adrenal myelolipoma. Soft Tissues/Bones: Mild moderate diffuse degenerative changes throughout the spine. Moderate thoracic dextroscoliosis. 1. No clear evidence for pulmonary embolus within the limitations of this study. 2. Opacity in the right posterolateral trachea which may represent mucoid secretion or aspirated material. 3. Respiratory motion and dependent atelectasis within both lungs. Moderate emphysema. 4. Partially visualized AAA measuring 2.9 x 3.1 cm. Follow-up guidelines provided below. 5. Fatty liver. Prior cholecystectomy. 6. Adrenal hyperplasia. Right adrenal myelolipoma measuring up to 1.3 cm. 7. Multiple probable bilateral renal cysts, some which are partially visualized. 8. Coronary artery disease. Atherosclerotic calcification and atheromatous plaque of the aorta. RECOMMENDATIONS: Managing Abdominal Aortic Aneurysms 2.6-2.9 cm: Every 5 years* 3.0-3.4 cm: Every 3 years. 3.5-3.9 cm: Every 1 year. 4.0-4.4 cm: Every 1 year. Recommend vascular consultation. 4.5-5.4 cm: Every 6 months. Recommend vascular consultation. Greater than or equal to 5.5 cm: Referral to vascular surgeon. *For abdominal aortas with maximum diameter of 2.6-2.9 cm meeting criteria for AAA (>50% of proximal normal segment). Reference: J Vasc Surg. 2009 Oct;50(4 Suppl):S2-49         Physical Examination:        Physical Exam   Constitutional: He is oriented to person, place, and time.    Overweight gentleman sitting in chair comfortable, in no distress, on NC   HENT:   Head: Normocephalic and atraumatic. Eyes: Right eye exhibits no discharge. Left eye exhibits no discharge. Cardiovascular: Intact distal pulses. Murmur (systolic) heard. Pulmonary/Chest: Effort normal. No respiratory distress. He has no wheezes. He exhibits tenderness (right lower chest from fall). Abdominal: Soft. He exhibits no distension. Musculoskeletal: He exhibits tenderness (lumbar pain). He exhibits no edema. Neurological: He is alert and oriented to person, place, and time. Skin: Skin is warm and dry. He is not diaphoretic. Assessment:        Primary Problem  Sepsis [facility])    Active Hospital Problems    Diagnosis Date Noted    Bacteremia [R78.81]     Thrombocytopenia ([facility] [D69.6] 02/22/2018    Sepsis ([facility] [A41.9] 02/21/2018    Diabetes mellitus type 2, insulin dependent ([facility] [E11.9, Z79.4] 02/21/2018    Hypertension [I10]     Hyperlipidemia [E78.5]     COPD (chronic obstructive pulmonary disease) ([facility] [J44.9]        Plan:         MSSA septicemia 2/2 pneumonia - resolved  - 2 sets BCx + MSSA; third set blood cultures (2/26) NGTD 4 days  - LUIS without evidence of vegetations  - PICC placement for IV rocephin til 3/10 per ID recs  - awaiting precert    Acute on chronic hypoxic & hypercapneic respiratory failure, resolving  - COPD on 3L home O2  - LLB airspace disease with partial clearing  - cont rocephin    HTN - overcorrected  - decreased lisinopril to 10 mg    DM II - hyperglycemic  - inc Lantus 32 QHS  - high dose ISS  - Hypoglycemia protocol; POCT glucose x4  - f/u A1C    Thrombocytopenia, chronic  - HCV negative  - Steady 90-110s, monitor    S/p fall x2  - Patient lightheaded, per family poor fluid intake  - No LOC, no head injury  - PT/OT    BPH  - cont Flomax, Proscar.      resolved sepsis   agitation   dementia nwith ac dilirium   respirdol added    bp low poor intake no uti   I=vcf bolus gived bp now ok   decrease ivf 75     d/c planning awaiting pre

## 2018-03-11 NOTE — PLAN OF CARE
Problem: Pain:  Goal: Control of acute pain  Control of acute pain   Outcome: Ongoing  Pt has chronic back pain, heat appled, tylenol given, pt repositioned for comfort    Problem: Restraint Use - Nonviolent/Non-Self-Destructive Behavior:  Goal: Absence of restraint indications  Absence of restraint indications    Outcome: Not Met This Shift  Pt remains confused, agitated, combative verbally and physically to staff, attempts to kick at staff at times if close. bilat wrist restraints cont, sitter remains at bedside  Goal: Absence of restraint-related injury  Absence of restraint-related injury    Outcome: Met This Shift  No injuries obtained this shift.     Problem: Skin Integrity:  Goal: Will show no infection signs and symptoms  Will show no infection signs and symptoms   Outcome: Met This Shift  No s/s noted, no new skin breakdown noted

## 2018-03-12 ENCOUNTER — APPOINTMENT (OUTPATIENT)
Dept: GENERAL RADIOLOGY | Age: 78
DRG: 871 | End: 2018-03-12
Payer: MEDICARE

## 2018-03-12 LAB
ABSOLUTE EOS #: 0.1 K/UL (ref 0–0.4)
ABSOLUTE IMMATURE GRANULOCYTE: ABNORMAL K/UL (ref 0–0.3)
ABSOLUTE LYMPH #: 1.1 K/UL (ref 1–4.8)
ABSOLUTE MONO #: 0.5 K/UL (ref 0.1–1.3)
ALLEN TEST: ABNORMAL
ANION GAP SERPL CALCULATED.3IONS-SCNC: 11 MMOL/L (ref 9–17)
ANION GAP SERPL CALCULATED.3IONS-SCNC: 14 MMOL/L (ref 9–17)
BASOPHILS # BLD: 0 % (ref 0–2)
BASOPHILS ABSOLUTE: 0 K/UL (ref 0–0.2)
BILIRUBIN URINE: NEGATIVE
BUN BLDV-MCNC: 13 MG/DL (ref 8–23)
BUN BLDV-MCNC: 14 MG/DL (ref 8–23)
BUN/CREAT BLD: ABNORMAL (ref 9–20)
BUN/CREAT BLD: ABNORMAL (ref 9–20)
CALCIUM SERPL-MCNC: 9 MG/DL (ref 8.6–10.4)
CALCIUM SERPL-MCNC: 9 MG/DL (ref 8.6–10.4)
CARBOXYHEMOGLOBIN: 1.8 % (ref 0–5)
CHLORIDE BLD-SCNC: 103 MMOL/L (ref 98–107)
CHLORIDE BLD-SCNC: 98 MMOL/L (ref 98–107)
CO2: 26 MMOL/L (ref 20–31)
CO2: 29 MMOL/L (ref 20–31)
COLOR: YELLOW
COMMENT UA: ABNORMAL
CREAT SERPL-MCNC: 0.69 MG/DL (ref 0.7–1.2)
CREAT SERPL-MCNC: 0.74 MG/DL (ref 0.7–1.2)
DIFFERENTIAL TYPE: ABNORMAL
EOSINOPHILS RELATIVE PERCENT: 1 % (ref 0–4)
FIO2: ABNORMAL
GFR AFRICAN AMERICAN: >60 ML/MIN
GFR AFRICAN AMERICAN: >60 ML/MIN
GFR NON-AFRICAN AMERICAN: >60 ML/MIN
GFR NON-AFRICAN AMERICAN: >60 ML/MIN
GFR SERPL CREATININE-BSD FRML MDRD: ABNORMAL ML/MIN/{1.73_M2}
GLUCOSE BLD-MCNC: 142 MG/DL (ref 75–110)
GLUCOSE BLD-MCNC: 152 MG/DL (ref 75–110)
GLUCOSE BLD-MCNC: 158 MG/DL (ref 70–99)
GLUCOSE BLD-MCNC: 189 MG/DL (ref 75–110)
GLUCOSE BLD-MCNC: 229 MG/DL (ref 75–110)
GLUCOSE BLD-MCNC: 238 MG/DL (ref 70–99)
GLUCOSE URINE: ABNORMAL
HCO3 ARTERIAL: 29.4 MMOL/L (ref 22–26)
HCT VFR BLD CALC: 36.8 % (ref 41–53)
HEMOGLOBIN: 12.5 G/DL (ref 13.5–17.5)
IMMATURE GRANULOCYTES: ABNORMAL %
KETONES, URINE: NEGATIVE
LEUKOCYTE ESTERASE, URINE: NEGATIVE
LYMPHOCYTES # BLD: 15 % (ref 24–44)
MCH RBC QN AUTO: 33.3 PG (ref 26–34)
MCHC RBC AUTO-ENTMCNC: 33.8 G/DL (ref 31–37)
MCV RBC AUTO: 98.5 FL (ref 80–100)
METHEMOGLOBIN: 0.4 % (ref 0–1.9)
MODE: ABNORMAL
MONOCYTES # BLD: 8 % (ref 1–7)
NEGATIVE BASE EXCESS, ART: ABNORMAL MMOL/L (ref 0–2)
NITRITE, URINE: NEGATIVE
NOTIFICATION TIME: ABNORMAL
NOTIFICATION: ABNORMAL
NRBC AUTOMATED: ABNORMAL PER 100 WBC
O2 DEVICE/FLOW/%: ABNORMAL
O2 SAT, ARTERIAL: 86.1 % (ref 95–98)
OXYHEMOGLOBIN: ABNORMAL % (ref 95–98)
PATIENT TEMP: 37
PCO2 ARTERIAL: 44.4 MMHG (ref 35–45)
PCO2, ART, TEMP ADJ: ABNORMAL (ref 35–45)
PDW BLD-RTO: 13.3 % (ref 11.5–14.9)
PEEP/CPAP: ABNORMAL
PH ARTERIAL: 7.43 (ref 7.35–7.45)
PH UA: 6.5 (ref 5–8)
PH, ART, TEMP ADJ: ABNORMAL (ref 7.35–7.45)
PLATELET # BLD: 144 K/UL (ref 150–450)
PLATELET ESTIMATE: ABNORMAL
PMV BLD AUTO: 8.2 FL (ref 6–12)
PO2 ARTERIAL: 52.7 MMHG (ref 80–100)
PO2, ART, TEMP ADJ: ABNORMAL MMHG (ref 80–100)
POSITIVE BASE EXCESS, ART: 5.1 MMOL/L (ref 0–2)
POTASSIUM SERPL-SCNC: 4 MMOL/L (ref 3.7–5.3)
POTASSIUM SERPL-SCNC: 4.1 MMOL/L (ref 3.7–5.3)
PROTEIN UA: NEGATIVE
PSV: ABNORMAL
PT. POSITION: ABNORMAL
RBC # BLD: 3.73 M/UL (ref 4.5–5.9)
RBC # BLD: ABNORMAL 10*6/UL
RESPIRATORY RATE: 18
SAMPLE SITE: ABNORMAL
SEG NEUTROPHILS: 76 % (ref 36–66)
SEGMENTED NEUTROPHILS ABSOLUTE COUNT: 5.4 K/UL (ref 1.3–9.1)
SET RATE: ABNORMAL
SODIUM BLD-SCNC: 138 MMOL/L (ref 135–144)
SODIUM BLD-SCNC: 143 MMOL/L (ref 135–144)
SPECIFIC GRAVITY UA: 1.01 (ref 1–1.03)
TEXT FOR RESPIRATORY: ABNORMAL
TOTAL HB: ABNORMAL G/DL (ref 12–16)
TOTAL RATE: ABNORMAL
TURBIDITY: CLEAR
URINE HGB: NEGATIVE
UROBILINOGEN, URINE: NORMAL
VT: ABNORMAL
WBC # BLD: 7.1 K/UL (ref 3.5–11)
WBC # BLD: ABNORMAL 10*3/UL

## 2018-03-12 PROCEDURE — 85025 COMPLETE CBC W/AUTO DIFF WBC: CPT

## 2018-03-12 PROCEDURE — 51798 US URINE CAPACITY MEASURE: CPT

## 2018-03-12 PROCEDURE — 2580000003 HC RX 258: Performed by: INTERNAL MEDICINE

## 2018-03-12 PROCEDURE — 2580000003 HC RX 258: Performed by: STUDENT IN AN ORGANIZED HEALTH CARE EDUCATION/TRAINING PROGRAM

## 2018-03-12 PROCEDURE — 6370000000 HC RX 637 (ALT 250 FOR IP): Performed by: RADIOLOGY

## 2018-03-12 PROCEDURE — 71045 X-RAY EXAM CHEST 1 VIEW: CPT

## 2018-03-12 PROCEDURE — 6370000000 HC RX 637 (ALT 250 FOR IP): Performed by: STUDENT IN AN ORGANIZED HEALTH CARE EDUCATION/TRAINING PROGRAM

## 2018-03-12 PROCEDURE — 80048 BASIC METABOLIC PNL TOTAL CA: CPT

## 2018-03-12 PROCEDURE — 6370000000 HC RX 637 (ALT 250 FOR IP): Performed by: INTERNAL MEDICINE

## 2018-03-12 PROCEDURE — 6360000002 HC RX W HCPCS: Performed by: STUDENT IN AN ORGANIZED HEALTH CARE EDUCATION/TRAINING PROGRAM

## 2018-03-12 PROCEDURE — 99232 SBSQ HOSP IP/OBS MODERATE 35: CPT | Performed by: INTERNAL MEDICINE

## 2018-03-12 PROCEDURE — 82805 BLOOD GASES W/O2 SATURATION: CPT

## 2018-03-12 PROCEDURE — 81003 URINALYSIS AUTO W/O SCOPE: CPT

## 2018-03-12 PROCEDURE — 6360000002 HC RX W HCPCS: Performed by: INTERNAL MEDICINE

## 2018-03-12 PROCEDURE — 6370000000 HC RX 637 (ALT 250 FOR IP): Performed by: PSYCHIATRY & NEUROLOGY

## 2018-03-12 PROCEDURE — 6370000000 HC RX 637 (ALT 250 FOR IP): Performed by: NURSE PRACTITIONER

## 2018-03-12 PROCEDURE — 51702 INSERT TEMP BLADDER CATH: CPT

## 2018-03-12 PROCEDURE — 36415 COLL VENOUS BLD VENIPUNCTURE: CPT

## 2018-03-12 PROCEDURE — 36600 WITHDRAWAL OF ARTERIAL BLOOD: CPT

## 2018-03-12 PROCEDURE — 82947 ASSAY GLUCOSE BLOOD QUANT: CPT

## 2018-03-12 PROCEDURE — 1200000000 HC SEMI PRIVATE

## 2018-03-12 RX ORDER — RISPERIDONE 1 MG/1
1 TABLET, FILM COATED ORAL 2 TIMES DAILY
Status: DISCONTINUED | OUTPATIENT
Start: 2018-03-12 | End: 2018-03-20 | Stop reason: HOSPADM

## 2018-03-12 RX ORDER — FUROSEMIDE 10 MG/ML
40 INJECTION INTRAMUSCULAR; INTRAVENOUS ONCE
Status: COMPLETED | OUTPATIENT
Start: 2018-03-12 | End: 2018-03-12

## 2018-03-12 RX ORDER — QUETIAPINE FUMARATE 50 MG/1
50 TABLET, FILM COATED ORAL 2 TIMES DAILY
Status: DISCONTINUED | OUTPATIENT
Start: 2018-03-12 | End: 2018-03-20 | Stop reason: HOSPADM

## 2018-03-12 RX ORDER — OLANZAPINE 10 MG/1
10 INJECTION, POWDER, LYOPHILIZED, FOR SOLUTION INTRAMUSCULAR 3 TIMES DAILY PRN
Status: DISCONTINUED | OUTPATIENT
Start: 2018-03-12 | End: 2018-03-20 | Stop reason: HOSPADM

## 2018-03-12 RX ORDER — IPRATROPIUM BROMIDE AND ALBUTEROL SULFATE 2.5; .5 MG/3ML; MG/3ML
1 SOLUTION RESPIRATORY (INHALATION) EVERY 4 HOURS PRN
Status: DISCONTINUED | OUTPATIENT
Start: 2018-03-12 | End: 2018-03-15

## 2018-03-12 RX ADMIN — Medication 10 ML: at 08:52

## 2018-03-12 RX ADMIN — QUETIAPINE FUMARATE 50 MG: 50 TABLET, FILM COATED ORAL at 20:53

## 2018-03-12 RX ADMIN — Medication 10 ML: at 21:38

## 2018-03-12 RX ADMIN — RISPERIDONE 0.5 MG: 0.5 TABLET, FILM COATED ORAL at 08:51

## 2018-03-12 RX ADMIN — SIMVASTATIN 20 MG: 20 TABLET, FILM COATED ORAL at 20:50

## 2018-03-12 RX ADMIN — Medication 40 UNITS: at 20:51

## 2018-03-12 RX ADMIN — METOPROLOL TARTRATE 12.5 MG: 25 TABLET ORAL at 08:50

## 2018-03-12 RX ADMIN — METOPROLOL TARTRATE 12.5 MG: 25 TABLET ORAL at 20:51

## 2018-03-12 RX ADMIN — INSULIN LISPRO 3 UNITS: 100 INJECTION, SOLUTION INTRAVENOUS; SUBCUTANEOUS at 08:48

## 2018-03-12 RX ADMIN — FUROSEMIDE 40 MG: 10 INJECTION, SOLUTION INTRAVENOUS at 17:54

## 2018-03-12 RX ADMIN — ENOXAPARIN SODIUM 40 MG: 40 INJECTION SUBCUTANEOUS at 08:49

## 2018-03-12 RX ADMIN — FINASTERIDE 5 MG: 5 TABLET, FILM COATED ORAL at 08:49

## 2018-03-12 RX ADMIN — SODIUM CHLORIDE: 9 INJECTION, SOLUTION INTRAVENOUS at 01:15

## 2018-03-12 RX ADMIN — RISPERIDONE 1 MG: 1 TABLET, FILM COATED ORAL at 20:50

## 2018-03-12 ASSESSMENT — ENCOUNTER SYMPTOMS
NAUSEA: 0
BLURRED VISION: 0
DOUBLE VISION: 0
VOMITING: 0
ABDOMINAL PAIN: 0
BACK PAIN: 1
SHORTNESS OF BREATH: 0

## 2018-03-12 NOTE — PLAN OF CARE
Problem: Falls - Risk of  Goal: Absence of falls  Outcome: Ongoing  Pt. Free of falls and injuries this shift. Problem: OXYGENATION/RESPIRATORY FUNCTION  Goal: Patient will maintain patent airway  Outcome: Ongoing  Maintained adequate oxygen saturations. Encouraged C and DB and incentive spirometry. Oxygen therapy as needed. See head to toe assessment. Problem: Gas Exchange - Impaired:  Goal: Levels of oxygenation will improve  Levels of oxygenation will improve   Outcome: Ongoing  Maintained adequate oxygen saturations. Encouraged C and DB and incentive spirometry. Oxygen therapy as needed. See head to toe assessment. Problem: Infection, Septic Shock:  Goal: Will show no infection signs and symptoms  Will show no infection signs and symptoms   Outcome: Ongoing  Vital signs are stable. No s/s of infection. We will continue to monitor. Problem: Pain:  Goal: Control of acute pain  Control of acute pain   Outcome: Ongoing  Adequate pain control achieved this shift. See MAR. Problem: Restraint Use - Nonviolent/Non-Self-Destructive Behavior:  Goal: Absence of restraint indications  Absence of restraint indications    Outcome: Ongoing  PCT is watching patient in room. Patient was resting comfortably for the majority of the shift. Problem: Skin Integrity:  Goal: Will show no infection signs and symptoms  Will show no infection signs and symptoms   Outcome: Ongoing  Skin integrity improved/maintained this shift. See head to toe assessment.

## 2018-03-12 NOTE — PROGRESS NOTES
[facility]    Progress Note    3/12/2018    2:05 PM    Name:   Ike Lopez  MRN:     [id number]     Acct:      [de-identified]   Room:   2068/2068-01   Day:  23  Admit Date:  2/21/2018  2:48 PM    PCP:   Samson James DO  Code Status:  Full Code    Subjective:     C/C:   Chief Complaint   Patient presents with    Shortness of Breath     Interval History Status: improved. Patient seen and assessed at bedside. No respiratory complaints on NC this morning. C/o of pain at the right lower chest and low back but does not want to take medication. CXR no mention of rib fracture but possible T6 vertebral height loss. Denies fevers/chills, SOB, CP. Worked with PT/OT today, tolerating PO. Awaiting precert for [facility]. less agitated   bp better   cont ivf     no focal deficits   no infection     less agitated   on respirdol     Brief History:     The patient is a 76 y.o. Non-/non  male who presents with Shortness of Breath and he is admitted to the hospital for the management of SOB and lightheadedness. Hx COPD, DM II, HTN, HLD; hx nephrolithiasis and bladder cysts with chronic UTIs per family. Per patient felt lightheaded while using the bathroom, dizziness without syncope, fell from toilet today sdfand was unable to get up. No LOC. Low fever and chills for past 1-2 days. Rhinorrhea and congestion. Nausea without emesis today. Chronic SOB, on 2L home O2 daytime with exercise and nighttime, but patient only using at nighttime. Baseline sputum production, usually unable to bring up. No chest pain. No abd pain. No flank pain. Per patient no change in fluid intake or urination, but per family patient not keeping hydrated. No diarrhea/constipation. Review of Systems:     Review of Systems   Constitutional: Negative for chills and fever. Eyes: Negative for blurred vision and double vision.    Respiratory: Negative for smoking; Hoarseness; Hyperlipidemia; Hypertension; Hypertrophy of nasal turbinates; Irritable bowel; MDRO (multiple drug resistant organisms) resistance; Mild obstructive sleep apnea; On supplemental oxygen therapy; Osteoarthritis; Paralysis of vocal cords; Restless legs syndrome; SOB (shortness of breath); Type II or unspecified type diabetes mellitus without mention of complication, not stated as uncontrolled; and Wears glasses. Social History:   reports that he quit smoking about 2 years ago. His smoking use included Cigarettes. He has a 100.00 pack-year smoking history. He has never used smokeless tobacco. He reports that he does not drink alcohol or use drugs. Family History:   Family History   Problem Relation Age of Onset    Diabetes Mother     Other Other      Testicular rhabomyosarcoma        Vitals:  /68   Pulse 84   Temp 98.2 °F (36.8 °C) (Axillary)   Resp 18   Ht 5' 7\" (1.702 m)   Wt 176 lb 12.9 oz (80.2 kg)   SpO2 90%   BMI 27.69 kg/m²    Temp (24hrs), Av.7 °F (37.1 °C), Min:98.1 °F (36.7 °C), Max:99.4 °F (37.4 °C)    Recent Labs      18   1614  18   2146  18   0737  18   1152   POCGLU  142*  154*  142*  152*       I/O (24Hr): Intake/Output Summary (Last 24 hours) at 18 1405  Last data filed at 18 0739   Gross per 24 hour   Intake             1900 ml   Output              350 ml   Net             1550 ml       Labs:    [unfilled]    Lab Results   Component Value Date/Time    SPECIAL  2018 09:32 AM     RIGHT HAND, 1mL ANAEROBIC, 5mL AEROBIC Performed at [facility]  2018 09:32 AM      [address] [phone]     Lab Results   Component Value Date/Time    CULTURE NO GROWTH 6 DAYS 2018 09:32 AM    CULTURE  2018 09:32 AM     Performed at [facility] [address] [phone]       [facility]    Radiology:    Xr Chest Standard (2 discomfort FINDINGS: Pelvic bones are intact without evidence of acute fracture or displacement. Proximal femurs appear to be grossly intact. There is an unchanged nonaggressive chondroid lesion in the right femoral neck, previously seen on CT dated 07/19/2011. Iliac arterial stents are noted. There is mild fecal material in the rectum, measuring 6.7 cm in transverse dimension. 1.  No evidence of acute pelvic fracture. 2.  Mild fecal distention of the rectum, measuring 6.7 cm in transverse dimension. 3.  Iliac arterial stents noted. Ct Head Wo Contrast    Result Date: 2/21/2018  EXAMINATION: CT OF THE HEAD WITHOUT CONTRAST  2/21/2018 4:24 pm TECHNIQUE: CT of the head was performed without the administration of intravenous contrast. Dose modulation, iterative reconstruction, and/or weight based adjustment of the mA/kV was utilized to reduce the radiation dose to as low as reasonably achievable. COMPARISON: None. HISTORY: ORDERING SYSTEM PROVIDED HISTORY: fall, hit head FINDINGS: BRAIN/VENTRICLES:  There are patient motion artifacts with image degradation. There is no acute intracranial hemorrhage, mass effect or midline shift. No abnormal extra-axial fluid collection. The gray-white differentiation is maintained without evidence of an acute infarct. There is no evidence of hydrocephalus. Cortical cerebral atrophy. Diminished attenuation of subcortical and periventricular white matter consistent with chronic microangiopathy. Several small lipomas of the falx cerebri. Arteriosclerotic calcified plaques of the carotid siphons. ORBITS: The visualized portion of the orbits demonstrate no acute abnormality. SINUSES: The visualized paranasal sinuses reveal focal mucoperiosteal thickening within the right posterior ethmoid sinus. Moderate mucoperiosteal thickening within the right sphenoid sinus. Mastoid air cells demonstrate no acute abnormality.  SOFT TISSUES/SKULL:  No acute abnormality of the visualized skull or soft tissues. No acute intracranial abnormality. Moderate mucoperiosteal thickening in the sphenoid sinus. Ct Cervical Spine Wo Contrast    Result Date: 2/21/2018  EXAMINATION: CT OF THE CERVICAL SPINE WITHOUT CONTRAST 2/21/2018 4:25 pm TECHNIQUE: CT of the cervical spine was performed without the administration of intravenous contrast. Multiplanar reformatted images are provided for review. Dose modulation, iterative reconstruction, and/or weight based adjustment of the mA/kV was utilized to reduce the radiation dose to as low as reasonably achievable. COMPARISON: None. HISTORY: ORDERING SYSTEM PROVIDED HISTORY: fall History of bladder cancer, osteoarthritis, skin cancer. FINDINGS: BONES/ALIGNMENT: There is no evidence of an acute cervical spine fracture. There is normal alignment of the cervical spine. DEGENERATIVE CHANGES: Advanced multilevel degenerative changes, most severe C4-C7 with marked disc space narrowing and endplate degenerative sclerosis with disc osteophyte complex type findings C5-C6 and C6-C7. Mild impingement on the canal and moderate -severe impingement on the neural foramina noted, the latter greatest on the left at C5 -C6 and C6 -C7. Mild multilevel facet arthropathy and additional spondylitic changes. Some degenerative change C1-C2 with mild pannus formation. No bony erosion. Some degenerative change noted visualized thoracic spine with a small lucency T2 on the left, and some lucency within the pedicle at T3 on the same side. SOFT TISSUES: There is no prevertebral soft tissue swelling. Mucosal thickening visualized sphenoid sinus. No air-fluid level noted. Carotid bifurcation vascular calcifications. Emphysematous changes mid upper visualized lungs. No acute abnormality of the cervical spine. Advanced multilevel degenerative changes, most severe C5-C7 with associated findings, as above. Emphysema/ COPD. Nonacute appearing sphenoid sinus disease.  Several small bony lucencies left T2 vertebral body and left pedicle at T3 likely incidental/ related to some degree of osteopenia ; correlate clinically and obtain follow-up imaging as indicated. Ct Chest Pulmonary Embolism W Contrast    Result Date: 2/21/2018  EXAMINATION: CTA OF THE CHEST 2/21/2018 5:23 pm TECHNIQUE: CTA of the chest was performed after the administration of intravenous contrast.  Multiplanar reformatted images are provided for review. MIP images are provided for review. Dose modulation, iterative reconstruction, and/or weight based adjustment of the mA/kV was utilized to reduce the radiation dose to as low as reasonably achievable. COMPARISON: 11/10/2015 HISTORY: ORDERING SYSTEM PROVIDED HISTORY: sob, elevated d-dimer. A 70-year-old male with shortness of breath and elevated D-dimer FINDINGS: Pulmonary Arteries: No obvious filling defect identified within the visualized pulmonary arterial vasculature that meets the criteria for pulmonary embolus. Evaluation of the bibasilar segmental and subsegmental pulmonary arteries is limited due to respiratory motion. Mediastinum: Visualized thyroid gland grossly unremarkable. No axillary, mediastinal, or hilar lymphadenopathy. Coronary artery disease. No pericardial or sizable pleural effusions. Diffuse atherosclerotic calcification and atheromatous plaque of the visualized aorta and branch vasculature. Mild cardiomegaly. Lungs/Pleura: Trachea and proximal central airways appear patent. Opacity within the right posterolateral trachea which may represent mucoid or aspirated material on image 29, series 7. Respiratory motion and dependent atelectasis within both lungs. Moderate emphysema. Upper Abdomen: Vascular stent graft is seen within the abdominal aorta. Partially visualized abdominal aortic aneurysm measuring 2.9 x 3.1 cm on image 148, series 5. Diffuse fatty infiltration of the liver. Prior cholecystectomy.   Mild adreniform enlargement of the bilateral Head: Normocephalic and atraumatic. Eyes: Right eye exhibits no discharge. Left eye exhibits no discharge. Cardiovascular: Intact distal pulses. Murmur (systolic) heard. Pulmonary/Chest: Effort normal. No respiratory distress. He has no wheezes. He exhibits tenderness (right lower chest from fall). Abdominal: Soft. He exhibits no distension. Musculoskeletal: He exhibits tenderness (lumbar pain). He exhibits no edema. Neurological: He is alert and oriented to person, place, and time. Skin: Skin is warm and dry. He is not diaphoretic. Assessment:        Primary Problem  Sepsis [facility])    Active Hospital Problems    Diagnosis Date Noted    Bacteremia [R78.81]     Thrombocytopenia ([facility] [D69.6] 02/22/2018    [facility] [A41.9] 02/21/2018    Diabetes mellitus type 2, insulin dependent ([facility] [E11.9, Z79.4] 02/21/2018    Hypertension [I10]     Hyperlipidemia [E78.5]     COPD (chronic obstructive pulmonary disease) ([facility] [J44.9]        Plan:         MSSA septicemia 2/2 pneumonia - resolved  - 2 sets BCx + MSSA; third set blood cultures (2/26) NGTD 4 days  - LUIS without evidence of vegetations  - PICC placement for IV rocephin til 3/10 per ID recs  - awaiting precert    Acute on chronic hypoxic & hypercapneic respiratory failure, resolving  - COPD on 3L home O2  - LLB airspace disease with partial clearing  - cont rocephin    HTN - overcorrected  - decreased lisinopril to 10 mg    DM II - hyperglycemic  - inc Lantus 32 QHS  - high dose ISS  - Hypoglycemia protocol; POCT glucose x4  - f/u A1C    Thrombocytopenia, chronic  - HCV negative  - Steady 90-110s, monitor    S/p fall x2  - Patient lightheaded, per family poor fluid intake  - No LOC, no head injury  - PT/OT    BPH  - cont Flomax, Proscar.      resolved sepsis   agitation   dementia nwith ac dilirium   respirdol added    bp low poor intake no uti   I=vcf bolus gived bp now ok   decrease ivf 75     d/c planning awaiting pre cert       less agitated   cont  respirdol   one on one    awaiting pre cert     sytill on one on one      respirdol   I mg bid resrt on seroquel   Amanda Rodriguez MD  3/12/2018  2:05 PM

## 2018-03-12 NOTE — PROGRESS NOTES
Pt has increased SOB. Breathing at a rate of 24. Vitals obtained and oxygen re-placed on pt. Pt has crackles on auscultation. Dr. Aliyah Sewell called. D/c fluids. And CXR ordered. Will continue to monitor.

## 2018-03-12 NOTE — PROGRESS NOTES
A1C:   No results for input(s): LABA1C in the last 72 hours. INR: No results for input(s): INR in the last 72 hours. Hepatic functions:   No results for input(s): ALKPHOS, ALT, AST, PROT, BILITOT, BILIDIR, LABALBU in the last 72 hours. Pancreatic functions:No results for input(s): LACTA, AMYLASE in the last 72 hours. S. Lactic Acid: No results for input(s): LACTA in the last 72 hours. Cardiac enzymes:No results for input(s): CKTOTAL, CKMB, CKMBINDEX, TROPONINI in the last 72 hours. BNP:No results for input(s): BNP in the last 72 hours. Lipid profile: No results for input(s): CHOL, TRIG, HDL, LDLCALC in the last 72 hours. Invalid input(s): LDL  Blood Gases: No results found for: PH, PCO2, PO2, HCO3, O2SAT  Thyroid functions: No results found for: TSH     Imaging/Diagonstics:  EKG: Normal sinus rhythm    CXR: No acute cardiopulmonary findings. ASSESSMENT:    Patient Active Problem List   Diagnosis    [facility]chronic obstructive pulmonary disease) [facility]    Diabetes 1.5, managed as type 2 ([facility]    Hyperlipidemia    Hypertension    History of bladder cancer    Tobacco use    Sepsis ([facility]    Diabetes mellitus type 2, insulin dependent ([facility]    Thrombocytopenia ([facility]    Bacteremia       PLAN:  MSSA septicemia secondary to pneumonia significant improvement IV Rocephin until March 10  COPD  Uncontrolled diabetes with A1c 9  Lantus adjusted  March 12  overnigt confusion ? Acute psychoissis  Vs metabolic encehalto  Will need placement          MD WING Castelan [address], [address] [place] [address].    Phone [phone]   Fax: [phone]  Answering Service: [phone]

## 2018-03-12 NOTE — PLAN OF CARE
Problem: Falls - Risk of  Goal: Absence of falls  Outcome: Ongoing  One on one continues. Hourly rounding. Pt. Free of falls and injuries this shift. Problem: OXYGENATION/RESPIRATORY FUNCTION  Goal: Patient will maintain patent airway  C+DB encouraged. Oxygen applied as needed. Oxygen saturation remains WDL    Problem: Infection, Septic Shock:  Goal: Will show no infection signs and symptoms  Will show no infection signs and symptoms   Outcome: Ongoing  Pt is drowsy this shift. Vital signs WDL. Will continue to monitor. Problem: Pain:  Goal: Control of acute pain  Control of acute pain   Outcome: Ongoing  Adequate pain control achieved this shift. See MAR. Problem: Nutrition  Goal: Optimal nutrition therapy  Outcome: Ongoing  Pt eating and drinking appropriatly. Will continue to monitor.

## 2018-03-13 LAB
ABSOLUTE EOS #: 0.2 K/UL (ref 0–0.4)
ABSOLUTE IMMATURE GRANULOCYTE: ABNORMAL K/UL (ref 0–0.3)
ABSOLUTE LYMPH #: 1 K/UL (ref 1–4.8)
ABSOLUTE MONO #: 0.5 K/UL (ref 0.1–1.3)
ANION GAP SERPL CALCULATED.3IONS-SCNC: 8 MMOL/L (ref 9–17)
BASOPHILS # BLD: 0 % (ref 0–2)
BASOPHILS ABSOLUTE: 0 K/UL (ref 0–0.2)
BUN BLDV-MCNC: 15 MG/DL (ref 8–23)
BUN/CREAT BLD: ABNORMAL (ref 9–20)
CALCIUM SERPL-MCNC: 9.2 MG/DL (ref 8.6–10.4)
CHLORIDE BLD-SCNC: 102 MMOL/L (ref 98–107)
CO2: 31 MMOL/L (ref 20–31)
CREAT SERPL-MCNC: 0.79 MG/DL (ref 0.7–1.2)
DIFFERENTIAL TYPE: ABNORMAL
EOSINOPHILS RELATIVE PERCENT: 3 % (ref 0–4)
GFR AFRICAN AMERICAN: >60 ML/MIN
GFR NON-AFRICAN AMERICAN: >60 ML/MIN
GFR SERPL CREATININE-BSD FRML MDRD: ABNORMAL ML/MIN/{1.73_M2}
GFR SERPL CREATININE-BSD FRML MDRD: ABNORMAL ML/MIN/{1.73_M2}
GLUCOSE BLD-MCNC: 145 MG/DL (ref 75–110)
GLUCOSE BLD-MCNC: 169 MG/DL (ref 75–110)
GLUCOSE BLD-MCNC: 222 MG/DL (ref 70–99)
GLUCOSE BLD-MCNC: 223 MG/DL (ref 75–110)
GLUCOSE BLD-MCNC: 228 MG/DL (ref 75–110)
HCT VFR BLD CALC: 35 % (ref 41–53)
HEMOGLOBIN: 11.7 G/DL (ref 13.5–17.5)
IMMATURE GRANULOCYTES: ABNORMAL %
LYMPHOCYTES # BLD: 15 % (ref 24–44)
MCH RBC QN AUTO: 33 PG (ref 26–34)
MCHC RBC AUTO-ENTMCNC: 33.5 G/DL (ref 31–37)
MCV RBC AUTO: 98.5 FL (ref 80–100)
MONOCYTES # BLD: 7 % (ref 1–7)
NRBC AUTOMATED: ABNORMAL PER 100 WBC
PDW BLD-RTO: 13.6 % (ref 11.5–14.9)
PLATELET # BLD: 136 K/UL (ref 150–450)
PLATELET ESTIMATE: ABNORMAL
PMV BLD AUTO: 8.2 FL (ref 6–12)
POTASSIUM SERPL-SCNC: 4.2 MMOL/L (ref 3.7–5.3)
RBC # BLD: 3.56 M/UL (ref 4.5–5.9)
RBC # BLD: ABNORMAL 10*6/UL
SEG NEUTROPHILS: 75 % (ref 36–66)
SEGMENTED NEUTROPHILS ABSOLUTE COUNT: 4.8 K/UL (ref 1.3–9.1)
SODIUM BLD-SCNC: 141 MMOL/L (ref 135–144)
WBC # BLD: 6.5 K/UL (ref 3.5–11)
WBC # BLD: ABNORMAL 10*3/UL

## 2018-03-13 PROCEDURE — 6370000000 HC RX 637 (ALT 250 FOR IP): Performed by: STUDENT IN AN ORGANIZED HEALTH CARE EDUCATION/TRAINING PROGRAM

## 2018-03-13 PROCEDURE — 99232 SBSQ HOSP IP/OBS MODERATE 35: CPT | Performed by: INTERNAL MEDICINE

## 2018-03-13 PROCEDURE — 2580000003 HC RX 258: Performed by: NURSE PRACTITIONER

## 2018-03-13 PROCEDURE — 2580000003 HC RX 258: Performed by: STUDENT IN AN ORGANIZED HEALTH CARE EDUCATION/TRAINING PROGRAM

## 2018-03-13 PROCEDURE — 6370000000 HC RX 637 (ALT 250 FOR IP): Performed by: INTERNAL MEDICINE

## 2018-03-13 PROCEDURE — 85025 COMPLETE CBC W/AUTO DIFF WBC: CPT

## 2018-03-13 PROCEDURE — 6370000000 HC RX 637 (ALT 250 FOR IP): Performed by: RADIOLOGY

## 2018-03-13 PROCEDURE — 6370000000 HC RX 637 (ALT 250 FOR IP): Performed by: NURSE PRACTITIONER

## 2018-03-13 PROCEDURE — 6370000000 HC RX 637 (ALT 250 FOR IP): Performed by: PSYCHIATRY & NEUROLOGY

## 2018-03-13 PROCEDURE — 1200000000 HC SEMI PRIVATE

## 2018-03-13 PROCEDURE — 82947 ASSAY GLUCOSE BLOOD QUANT: CPT

## 2018-03-13 PROCEDURE — 80048 BASIC METABOLIC PNL TOTAL CA: CPT

## 2018-03-13 PROCEDURE — 36415 COLL VENOUS BLD VENIPUNCTURE: CPT

## 2018-03-13 PROCEDURE — 97110 THERAPEUTIC EXERCISES: CPT

## 2018-03-13 PROCEDURE — 97116 GAIT TRAINING THERAPY: CPT

## 2018-03-13 RX ORDER — FLUDROCORTISONE ACETATE 0.1 MG/1
0.1 TABLET ORAL DAILY
Status: DISCONTINUED | OUTPATIENT
Start: 2018-03-13 | End: 2018-03-20 | Stop reason: HOSPADM

## 2018-03-13 RX ORDER — 0.9 % SODIUM CHLORIDE 0.9 %
500 INTRAVENOUS SOLUTION INTRAVENOUS ONCE
Status: COMPLETED | OUTPATIENT
Start: 2018-03-13 | End: 2018-03-13

## 2018-03-13 RX ADMIN — Medication 10 ML: at 22:34

## 2018-03-13 RX ADMIN — ROPINIROLE HYDROCHLORIDE 2 MG: 2 TABLET, FILM COATED ORAL at 22:27

## 2018-03-13 RX ADMIN — Medication 40 UNITS: at 22:28

## 2018-03-13 RX ADMIN — FLUDROCORTISONE ACETATE 0.1 MG: 0.1 TABLET ORAL at 12:09

## 2018-03-13 RX ADMIN — METOPROLOL TARTRATE 12.5 MG: 25 TABLET ORAL at 22:29

## 2018-03-13 RX ADMIN — INSULIN LISPRO 6 UNITS: 100 INJECTION, SOLUTION INTRAVENOUS; SUBCUTANEOUS at 16:37

## 2018-03-13 RX ADMIN — SODIUM CHLORIDE 500 ML: 0.9 INJECTION, SOLUTION INTRAVENOUS at 00:50

## 2018-03-13 RX ADMIN — ACETAMINOPHEN 650 MG: 325 TABLET, FILM COATED ORAL at 08:56

## 2018-03-13 RX ADMIN — QUETIAPINE FUMARATE 50 MG: 50 TABLET, FILM COATED ORAL at 08:03

## 2018-03-13 RX ADMIN — Medication 10 ML: at 08:11

## 2018-03-13 RX ADMIN — RISPERIDONE 1 MG: 1 TABLET, FILM COATED ORAL at 08:02

## 2018-03-13 RX ADMIN — METOPROLOL TARTRATE 12.5 MG: 25 TABLET ORAL at 08:02

## 2018-03-13 RX ADMIN — QUETIAPINE FUMARATE 50 MG: 50 TABLET, FILM COATED ORAL at 22:28

## 2018-03-13 RX ADMIN — SIMVASTATIN 20 MG: 20 TABLET, FILM COATED ORAL at 22:29

## 2018-03-13 RX ADMIN — INSULIN LISPRO 6 UNITS: 100 INJECTION, SOLUTION INTRAVENOUS; SUBCUTANEOUS at 22:30

## 2018-03-13 RX ADMIN — RISPERIDONE 1 MG: 1 TABLET, FILM COATED ORAL at 22:27

## 2018-03-13 RX ADMIN — ACETAMINOPHEN 650 MG: 325 TABLET, FILM COATED ORAL at 15:49

## 2018-03-13 ASSESSMENT — PAIN SCALES - GENERAL
PAINLEVEL_OUTOF10: 10
PAINLEVEL_OUTOF10: 10
PAINLEVEL_OUTOF10: 0
PAINLEVEL_OUTOF10: 7
PAINLEVEL_OUTOF10: 10

## 2018-03-13 ASSESSMENT — PAIN DESCRIPTION - ORIENTATION: ORIENTATION: LOWER

## 2018-03-13 ASSESSMENT — PAIN DESCRIPTION - LOCATION: LOCATION: BACK

## 2018-03-13 ASSESSMENT — PAIN DESCRIPTION - PAIN TYPE: TYPE: CHRONIC PAIN

## 2018-03-13 NOTE — PROGRESS NOTES
use at 2 liters per minute.  Osteoarthritis     Paralysis of vocal cords     Paralyzed left vocal cord, idiopathic    Restless legs syndrome     controlled with Requip    SOB (shortness of breath)     Type II or unspecified type diabetes mellitus without mention of complication, not stated as uncontrolled     controlled on oral agents    Wears glasses     for reading      Past Surgical History:   Procedure Laterality Date    ABDOMINAL AORTIC ANEURYSM REPAIR  06/26/2013    BACK SURGERY      tumor    BLADDER SURGERY      bx    CATARACT REMOVAL Right     CHOLECYSTECTOMY      CYST REMOVAL      buttocks, benign    CYSTOSCOPY W BIOPSY OF BLADDER N/A 4/28/2017    CYSTOSCOPY BLADDER BIOPSY FULGERATION performed by Aida Lee MD at [address] [place] [address] Left 12/2015    HERNIA REPAIR      Inguinal herniorrhaphy    KIDNEY STONE SURGERY      extraction of kidney stones    LARYNGOSCOPY      NOSE SURGERY      SKIN BIOPSY  2008    back---bcc    SKIN CANCER EXCISION Right 10/18/2016    basal cell rt. cheek    SMALL INTESTINE SURGERY      \"a long time ago, i had 16 inches removed\"          Admission Meds  No current facility-administered medications on file prior to encounter. Current Outpatient Prescriptions on File Prior to Encounter   Medication Sig Dispense Refill    finasteride (PROSCAR) 5 MG tablet TAKE 1 TABLET BY MOUTH DAILY 90 tablet 3    rOPINIRole (REQUIP) 2 MG tablet TAKE ONE TABLET BY MOUTH EVERY EVENING AS DIRECTED 90 tablet 1    lovastatin (MEVACOR) 40 MG tablet TAKE 1 TABLET BY MOUTH NIGHTLY 90 tablet 1    PROAIR  (90 BASE) MCG/ACT inhaler INHALE 2 PUFFS USING INHALER EVERY 4 HOURS AS NEEDED 6 Inhaler 3    fluticasone-salmeterol (ADVAIR) 250-50 MCG/DOSE AEPB Inhale 1 puff into the lungs every 12 hours.  (Patient taking differently: Inhale 1 puff into the lungs every 12 hours as needed ) 60 each 5    quinapril (ACCUPRIL) 40 MG tablet Take 1 tablet by mouth daily 90 tablet 3    B-D UF III MINI PEN NEEDLES 31G X 5 MM MISC USE WITH INSULIN 4 TIMES DAILY 100 each 2           Allergies  Allergies   Allergen Reactions    Ativan [Lorazepam]      hallucinations    Codeine      Cough syrup gi upset        Social   Social History   Substance Use Topics    Smoking status: Former Smoker     Packs/day: 2.00     Years: 50.00     Types: Cigarettes     Quit date: 12/14/2015    Smokeless tobacco: Never Used    Alcohol use No      Comment: rarely             Family History   Problem Relation Age of Onset    Diabetes Mother     Other Other      Testicular rhabomyosarcoma           Review of Systems        Tolerating antibiotics. Physical Exam  /80   Pulse 87   Temp 97 °F (36.1 °C) (Axillary)   Resp 20   Ht 5' 7\" (1.702 m)   Wt 176 lb 12.9 oz (80.2 kg)   SpO2 100%   BMI 27.69 kg/m²           General Appearance:NAD  Skin: warm and dry, no rash    Neck: neck supple    Pulmonary/Chest: Decreased aeration bilaterally-  Cardiovascular: normal rate, regular rhythm, normal S1 and S2, no murmurs  Abdomen: soft, non-distended   Extremities: no cyanosis, clubbing. Both feet mild erythema   Musculoskeletal:  no joint swelling  MEDLINE SITE OK     Data Review:    Recent Labs      03/11/18   0521  03/12/18   0500  03/13/18   0900   WBC  6.5  7.1  6.5   HGB  12.3*  12.5*  11.7*   HCT  36.3*  36.8*  35.0*   MCV  98.8  98.5  98.5   PLT  133*  144*  136*     Component Results     Component Collected Lab   Specimen Description 02/22/2018  5:35 AM [address] Lab   . NASOPHARYNGEAL SWAB Performed at [address] [place] [address] Dr Zaid Brandt 44    Specimen Description 02/22/2018  5:35 AM [facility].  [address] [phone]    Special Requests 02/22/2018  5:35  [address] [facility]   NOT REPORTED    Direct Exam 02/22/2018  5:35 AM [address] for Influenza A + B antigens. Doppler LE ,no DVT     Assessment:   Staph aureus bacteremia ,possible pulmonary source. Sepsis secondary to above resolved. Encephalopathy likely metabolic . Active Problems:    COPD (chronic obstructive pulmonary disease) (HCC)    Hyperlipidemia    Hypertension    Diabetes mellitus type 2, insulin dependent (HCC)    Thrombocytopenia ([facility]  H/o Bladder CA  Recommendations:     Off ABXS   Remove midline when ok with primary. Pascual Connell  Attending Physician Statement  I have discussed the care of the patient, including pertinent history and exam findings,  with the resident. I have reviewed the key elements of all parts of the encounter with the resident. I agree with the assessment, plan and orders as documented by the resident.     Judy Burch

## 2018-03-13 NOTE — PLAN OF CARE
Problem: Falls - Risk of  Goal: Absence of falls  Outcome: Ongoing  Pt. Free of falls and injuries this shift. Problem: OXYGENATION/RESPIRATORY FUNCTION  Goal: Patient will maintain patent airway  Outcome: Ongoing  C+DB encouraged. Oxygen applied as needed. Oxygen saturation remains WDL    Problem: Gas Exchange - Impaired:  Goal: Levels of oxygenation will improve  Levels of oxygenation will improve   Outcome: Ongoing  Oxygen applied as needed. Saturation remains WDL. Pt SOB at times. Will continue to monitor. Problem: Infection, Septic Shock:  Goal: Will show no infection signs and symptoms  Will show no infection signs and symptoms   Outcome: Ongoing  Pt remains afebrile. No new s/s of infection noted. Problem: Nutrition  Goal: Optimal nutrition therapy  Outcome: Ongoing  Pt starting to eat more appropriately. Will continue to monitor.

## 2018-03-13 NOTE — PROGRESS NOTES
Called to patient's room per Ashley Chi RN. Patient hypotensive with SBP in 80s, verified by manual reading. Upon examination, lungs diminished posteriorly; no rales auscultated. Tongue and oral mucosa extremely dry. 500 ml NS fluid bolus ordered. Will continue to monitor overnight.

## 2018-03-14 ENCOUNTER — APPOINTMENT (OUTPATIENT)
Dept: GENERAL RADIOLOGY | Age: 78
DRG: 871 | End: 2018-03-14
Payer: MEDICARE

## 2018-03-14 LAB
ABSOLUTE EOS #: 0.2 K/UL (ref 0–0.4)
ABSOLUTE IMMATURE GRANULOCYTE: ABNORMAL K/UL (ref 0–0.3)
ABSOLUTE LYMPH #: 0.9 K/UL (ref 1–4.8)
ABSOLUTE MONO #: 0.5 K/UL (ref 0.1–1.3)
ANION GAP SERPL CALCULATED.3IONS-SCNC: 12 MMOL/L (ref 9–17)
BASOPHILS # BLD: 0 % (ref 0–2)
BASOPHILS ABSOLUTE: 0 K/UL (ref 0–0.2)
BUN BLDV-MCNC: 18 MG/DL (ref 8–23)
BUN/CREAT BLD: ABNORMAL (ref 9–20)
CALCIUM SERPL-MCNC: 9.1 MG/DL (ref 8.6–10.4)
CHLORIDE BLD-SCNC: 102 MMOL/L (ref 98–107)
CO2: 26 MMOL/L (ref 20–31)
CREAT SERPL-MCNC: 0.71 MG/DL (ref 0.7–1.2)
DIFFERENTIAL TYPE: ABNORMAL
EOSINOPHILS RELATIVE PERCENT: 3 % (ref 0–4)
GFR AFRICAN AMERICAN: >60 ML/MIN
GFR NON-AFRICAN AMERICAN: >60 ML/MIN
GFR SERPL CREATININE-BSD FRML MDRD: ABNORMAL ML/MIN/{1.73_M2}
GFR SERPL CREATININE-BSD FRML MDRD: ABNORMAL ML/MIN/{1.73_M2}
GLUCOSE BLD-MCNC: 147 MG/DL (ref 75–110)
GLUCOSE BLD-MCNC: 174 MG/DL (ref 75–110)
GLUCOSE BLD-MCNC: 195 MG/DL (ref 75–110)
GLUCOSE BLD-MCNC: 205 MG/DL (ref 75–110)
GLUCOSE BLD-MCNC: 216 MG/DL (ref 70–99)
HCT VFR BLD CALC: 37.1 % (ref 41–53)
HEMOGLOBIN: 12.4 G/DL (ref 13.5–17.5)
IMMATURE GRANULOCYTES: ABNORMAL %
LYMPHOCYTES # BLD: 13 % (ref 24–44)
MCH RBC QN AUTO: 33.4 PG (ref 26–34)
MCHC RBC AUTO-ENTMCNC: 33.5 G/DL (ref 31–37)
MCV RBC AUTO: 99.6 FL (ref 80–100)
MONOCYTES # BLD: 7 % (ref 1–7)
NRBC AUTOMATED: ABNORMAL PER 100 WBC
PDW BLD-RTO: 13.5 % (ref 11.5–14.9)
PLATELET # BLD: 147 K/UL (ref 150–450)
PLATELET ESTIMATE: ABNORMAL
PMV BLD AUTO: 8.1 FL (ref 6–12)
POTASSIUM SERPL-SCNC: 4.2 MMOL/L (ref 3.7–5.3)
RBC # BLD: 3.73 M/UL (ref 4.5–5.9)
RBC # BLD: ABNORMAL 10*6/UL
SEG NEUTROPHILS: 77 % (ref 36–66)
SEGMENTED NEUTROPHILS ABSOLUTE COUNT: 5.2 K/UL (ref 1.3–9.1)
SODIUM BLD-SCNC: 140 MMOL/L (ref 135–144)
WBC # BLD: 6.8 K/UL (ref 3.5–11)
WBC # BLD: ABNORMAL 10*3/UL

## 2018-03-14 PROCEDURE — 6370000000 HC RX 637 (ALT 250 FOR IP): Performed by: INTERNAL MEDICINE

## 2018-03-14 PROCEDURE — 2580000003 HC RX 258: Performed by: STUDENT IN AN ORGANIZED HEALTH CARE EDUCATION/TRAINING PROGRAM

## 2018-03-14 PROCEDURE — 72100 X-RAY EXAM L-S SPINE 2/3 VWS: CPT

## 2018-03-14 PROCEDURE — 6370000000 HC RX 637 (ALT 250 FOR IP): Performed by: STUDENT IN AN ORGANIZED HEALTH CARE EDUCATION/TRAINING PROGRAM

## 2018-03-14 PROCEDURE — 97535 SELF CARE MNGMENT TRAINING: CPT

## 2018-03-14 PROCEDURE — 6370000000 HC RX 637 (ALT 250 FOR IP): Performed by: NURSE PRACTITIONER

## 2018-03-14 PROCEDURE — 94760 N-INVAS EAR/PLS OXIMETRY 1: CPT

## 2018-03-14 PROCEDURE — 99232 SBSQ HOSP IP/OBS MODERATE 35: CPT | Performed by: INTERNAL MEDICINE

## 2018-03-14 PROCEDURE — 6370000000 HC RX 637 (ALT 250 FOR IP): Performed by: RADIOLOGY

## 2018-03-14 PROCEDURE — 80048 BASIC METABOLIC PNL TOTAL CA: CPT

## 2018-03-14 PROCEDURE — 6360000002 HC RX W HCPCS: Performed by: STUDENT IN AN ORGANIZED HEALTH CARE EDUCATION/TRAINING PROGRAM

## 2018-03-14 PROCEDURE — 6370000000 HC RX 637 (ALT 250 FOR IP): Performed by: PSYCHIATRY & NEUROLOGY

## 2018-03-14 PROCEDURE — 82947 ASSAY GLUCOSE BLOOD QUANT: CPT

## 2018-03-14 PROCEDURE — 1200000000 HC SEMI PRIVATE

## 2018-03-14 PROCEDURE — 36415 COLL VENOUS BLD VENIPUNCTURE: CPT

## 2018-03-14 PROCEDURE — 85025 COMPLETE CBC W/AUTO DIFF WBC: CPT

## 2018-03-14 RX ORDER — LIDOCAINE 50 MG/G
1 PATCH TOPICAL DAILY
Status: DISCONTINUED | OUTPATIENT
Start: 2018-03-14 | End: 2018-03-20 | Stop reason: HOSPADM

## 2018-03-14 RX ADMIN — ROPINIROLE HYDROCHLORIDE 2 MG: 2 TABLET, FILM COATED ORAL at 22:04

## 2018-03-14 RX ADMIN — FLUDROCORTISONE ACETATE 0.1 MG: 0.1 TABLET ORAL at 08:46

## 2018-03-14 RX ADMIN — QUETIAPINE FUMARATE 50 MG: 50 TABLET, FILM COATED ORAL at 08:46

## 2018-03-14 RX ADMIN — RISPERIDONE 1 MG: 1 TABLET, FILM COATED ORAL at 22:05

## 2018-03-14 RX ADMIN — ENOXAPARIN SODIUM 40 MG: 40 INJECTION SUBCUTANEOUS at 08:45

## 2018-03-14 RX ADMIN — FINASTERIDE 5 MG: 5 TABLET, FILM COATED ORAL at 08:45

## 2018-03-14 RX ADMIN — Medication 10 ML: at 22:04

## 2018-03-14 RX ADMIN — ACETAMINOPHEN 650 MG: 325 TABLET, FILM COATED ORAL at 05:27

## 2018-03-14 RX ADMIN — RISPERIDONE 1 MG: 1 TABLET, FILM COATED ORAL at 08:45

## 2018-03-14 RX ADMIN — SIMVASTATIN 20 MG: 20 TABLET, FILM COATED ORAL at 22:06

## 2018-03-14 RX ADMIN — Medication 40 UNITS: at 22:04

## 2018-03-14 RX ADMIN — METOPROLOL TARTRATE 12.5 MG: 25 TABLET ORAL at 08:46

## 2018-03-14 RX ADMIN — QUETIAPINE FUMARATE 50 MG: 50 TABLET, FILM COATED ORAL at 22:30

## 2018-03-14 RX ADMIN — Medication 10 ML: at 09:15

## 2018-03-14 RX ADMIN — METOPROLOL TARTRATE 12.5 MG: 25 TABLET ORAL at 22:05

## 2018-03-14 RX ADMIN — ACETAMINOPHEN 650 MG: 325 TABLET, FILM COATED ORAL at 00:18

## 2018-03-14 RX ADMIN — INSULIN LISPRO 3 UNITS: 100 INJECTION, SOLUTION INTRAVENOUS; SUBCUTANEOUS at 22:04

## 2018-03-14 ASSESSMENT — PAIN DESCRIPTION - LOCATION
LOCATION: BACK

## 2018-03-14 ASSESSMENT — PAIN SCALES - GENERAL
PAINLEVEL_OUTOF10: 9
PAINLEVEL_OUTOF10: 7
PAINLEVEL_OUTOF10: 10
PAINLEVEL_OUTOF10: 7
PAINLEVEL_OUTOF10: 2

## 2018-03-14 ASSESSMENT — PAIN DESCRIPTION - PAIN TYPE
TYPE: ACUTE PAIN

## 2018-03-14 NOTE — PROGRESS NOTES
Bedside rounding done, per Belkis Fry and Anayeli Avina RN's  IV  Midline intact  Dual ports, no redness noted  One on one cont. At bedside, /R/t  Pt. beind confused , disoriented, and high risk for falls  Castaneda catheter intact  BEd alarm on  Passed on to Belkis Fry that Dr. Jamal Isabel was paged , to give results of back X RAYS FROM TODAY.

## 2018-03-14 NOTE — PROGRESS NOTES
normal.  Regular rate and rhythm   Abdomen - Soft, nontender, nondistended, no masses or organomegaly  Neurologic - There are no focal motor or sensory deficits  Skin - No bruising or bleeding  Extremities - No clubbing, cyanosis, edema    MEDS      fludrocortisone  0.1 mg Oral Daily    QUEtiapine  50 mg Oral BID    risperiDONE  1 mg Oral BID    metoprolol tartrate  12.5 mg Oral BID    insulin glargine  40 Units Subcutaneous Nightly    insulin lispro  0-18 Units Subcutaneous 4x Daily AC & HS    rOPINIRole  2 mg Oral Nightly    sodium chloride flush  10 mL Intravenous 2 times per day    enoxaparin  40 mg Subcutaneous Daily    finasteride  5 mg Oral Daily    simvastatin  20 mg Oral Nightly      dextrose       OLANZapine **AND** sterile water, ipratropium-albuterol, hydrALAZINE, ondansetron, sodium chloride flush, acetaminophen, sodium phosphate IVPB **OR** sodium phosphate IVPB, potassium chloride **OR** potassium chloride **OR** potassium chloride, magnesium sulfate, glucose, dextrose, glucagon (rDNA), dextrose    LABS   CBC   Recent Labs      03/14/18   0714   WBC  6.8   HGB  12.4*   HCT  37.1*   MCV  99.6   PLT  147*     BMP: Lab Results   Component Value Date     03/14/2018    K 4.2 03/14/2018     03/14/2018    CO2 26 03/14/2018    BUN 18 03/14/2018    LABALBU 3.4 03/07/2018    CREATININE 0.71 03/14/2018    CALCIUM 9.1 03/14/2018    GFRAA >60 03/14/2018    LABGLOM >60 03/14/2018     ABGs:  Lab Results   Component Value Date    PHART 7.429 03/12/2018    PO2ART 52.7 03/12/2018    MGV8MKR 44.4 03/12/2018    Lab Results   Component Value Date    MODE NOT REPORTED 03/12/2018     Ionized Calcium:  No results found for: IONCA  Magnesium:    Lab Results   Component Value Date    MG 2.5 02/26/2018     Phosphorus:    Lab Results   Component Value Date    PHOS 2.1 02/26/2018        LIVER PROFILE No results for input(s): AST, ALT, LIPASE, BILIDIR, BILITOT, ALKPHOS in the last 72 hours.     Invalid

## 2018-03-14 NOTE — PROGRESS NOTES
Physical Therapy  Facility/Department: [facility] MED SURG  Daily Treatment Note  NAME: Irasema Huddleston  : 1940  MRN: [id number]    Date of Service: 3/13/2018    Patient Diagnosis(es):   Patient Active Problem List    Diagnosis Date Noted    Bacteremia     Thrombocytopenia ([facility] 2018    Sepsis ([facility] 2018    Diabetes mellitus type 2, insulin dependent ([facility] 2018    Tobacco use 2015    History of bladder cancer 2014    COPD (chronic obstructive pulmonary disease) ([facility]     Diabetes 1.5, managed as type 2 ([facility]     Hyperlipidemia     Hypertension        Past Medical History:   Diagnosis Date    Abdominal aortic aneurysm (AAA) ([facility]     small, post endovascular repair    Alveolar hypoventilation     on oxygen    Asthma     BCC (basal cell carcinoma of skin)     Bladder cancer (HCC)     BPH (benign prostatic hyperplasia)     Cataracts, bilateral     hx of    Chronic constipation     Chronic cor pulmonale (HCC)     on nocturnal oxygen    Chronic hypoxemic respiratory failure (HCC)     COPD (chronic obstructive pulmonary disease) (HCC)     stage II    Depression     Diverticulitis     GERD (gastroesophageal reflux disease)     Hard of hearing     both ears    History of kidney stones     passed on own years ago    History of nasal obstruction     History of smoking     Hoarseness     multifactorial    Hyperlipidemia     Hypertension     Hypertrophy of nasal turbinates     Irritable bowel     MDRO (multiple drug resistant organisms) resistance 2015    MRSA?  Mild obstructive sleep apnea     On supplemental oxygen therapy     2 liters per minute at night, patient does not use during the day, but supposed to use at 2 liters per minute.     Osteoarthritis     Paralysis of vocal cords     Paralyzed left vocal cord, idiopathic    Restless legs syndrome     controlled with Requip    SOB (shortness of breath)     Type II or unspecified type diabetes (P) 2 weeks  Specific instructions for Next Treatment: (P) ther exs and ther activities as tolerated  Current Treatment Recommendations: Strengthening, Balance Training, Functional Mobility Training, Transfer Training, Gait Training  Safety Devices  Type of devices: (P) Gait belt, Sitter present, All fall risk precautions in place     Therapy Time   Individual Concurrent Group Co-treatment   Time In 1519         Time Out 1542         Minutes [address] David PTA   Electronically signed by Javier Logan PTA on 3/13/2018 at 8:38 PM

## 2018-03-14 NOTE — PROGRESS NOTES
[facility].    Date:   3/14/2018  Patient name:  Susan Martin  Date of admission:  2/21/2018  2:48 PM  MRN:   [id number]  YOB: 1940      HPI       Overnight no fever chills confused  Sitter at the bedside  REVIEW OF SYSTEMS:    · Unable to get ros  · ams  ·   ·         OBJECTIVE:    /76   Pulse 93   Temp 98 °F (36.7 °C) (Oral)   Resp 18   Ht 5' 7\" (1.702 m)   Wt 176 lb 12.9 oz (80.2 kg)   SpO2 98%   BMI 27.69 kg/m²    Oxygen with a walker  · Lungs: Mild wheeze bilaterally good air entry  · Heart: regular rate and rhythm, S1, S2 normal, no murmur, click, rub or gallop  · Abdomen: soft, non-tender; bowel sounds normal; no masses,  no organomegaly  · Extremities: extremities normal, atraumatic, no cyanosis or edema  · Neurological:  Reflexes normal and symmetric. Sensation grossly normal  · Confused  · With sitter  ·   · Skin - no rash, no lump   · Eye- no icterus no redness  · GI-oral mucosa moist,  · Lymphatic system-no lymphadenopathy no splenomegaly  · Mouth- mucous membrane moist  · Head-normocephalic atraumatic  · Neck- supple no carotid bruit,Thyroid not palpable. Laboratory Testing:  CBC:   Recent Labs      03/14/18   0714   WBC  6.8   HGB  12.4*   PLT  147*     BMP:    Recent Labs      03/12/18   1818  03/13/18   0900  03/14/18   0714   NA  138  141  140   K  4.0  4.2  4.2   CL  98  102  102   CO2  26  31  26   BUN  14  15  18   CREATININE  0.74  0.79  0.71   GLUCOSE  238*  222*  216*     S. Calcium:  Recent Labs      03/14/18   0714   CALCIUM  9.1     S. Ionized Calcium:No results for input(s): IONCA in the last 72 hours. S. Magnesium:No results for input(s): MG in the last 72 hours. S. Phosphorus:No results for input(s): PHOS in the last 72 hours.   S. Glucose:  Recent Labs      03/13/18   1613  03/13/18   1952  03/14/18   0717   POCGLU  223*  228*  205*     Glycosylated hemoglobin A1C:   No

## 2018-03-14 NOTE — PROGRESS NOTES
Physical Therapy  DATE: 3/14/2018    NAME: Carmen Ball  MRN: [id number]   : 1940    Patient not seen this date for Physical Therapy due to:  [] Blood transfusion in progress  [] Hemodialysis  [x]  Patient Declined 11:15 a.m Pt very agitated this a.m. Would not cooperate. Nursing notified Jennifer Gonsalez  [] Spine Precautions   [] Strict Bedrest  [] Surgery/ Procedure  [] Testing      [] Other        [] PT being discontinued at this time. Patient independent. No further needs. [] PT being discontinued at this time as the patient has been transferred to palliative care. No further needs.     Carolyn Resendiz PTA   Electronically signed by Carolyn Resendiz PTA on 3/14/18 at 2:18 PM

## 2018-03-14 NOTE — PROGRESS NOTES
Osteoarthritis     Paralysis of vocal cords     Paralyzed left vocal cord, idiopathic    Restless legs syndrome     controlled with Requip    SOB (shortness of breath)     Type II or unspecified type diabetes mellitus without mention of complication, not stated as uncontrolled     controlled on oral agents    Wears glasses     for reading      Past Surgical History:   Procedure Laterality Date    ABDOMINAL AORTIC ANEURYSM REPAIR  06/26/2013    BACK SURGERY      tumor    BLADDER SURGERY      bx    CATARACT REMOVAL Right     CHOLECYSTECTOMY      CYST REMOVAL      buttocks, benign    CYSTOSCOPY W BIOPSY OF BLADDER N/A 4/28/2017    CYSTOSCOPY BLADDER BIOPSY FULGERATION performed by Azam Carpio MD at [address] [place] [address] Left 12/2015    HERNIA REPAIR      Inguinal herniorrhaphy    KIDNEY STONE SURGERY      extraction of kidney stones    LARYNGOSCOPY      NOSE SURGERY      SKIN BIOPSY  2008    back---bcc    SKIN CANCER EXCISION Right 10/18/2016    basal cell rt. cheek    SMALL INTESTINE SURGERY      \"a long time ago, i had 16 inches removed\"          Admission Meds  No current facility-administered medications on file prior to encounter. Current Outpatient Prescriptions on File Prior to Encounter   Medication Sig Dispense Refill    finasteride (PROSCAR) 5 MG tablet TAKE 1 TABLET BY MOUTH DAILY 90 tablet 3    rOPINIRole (REQUIP) 2 MG tablet TAKE ONE TABLET BY MOUTH EVERY EVENING AS DIRECTED 90 tablet 1    lovastatin (MEVACOR) 40 MG tablet TAKE 1 TABLET BY MOUTH NIGHTLY 90 tablet 1    PROAIR  (90 BASE) MCG/ACT inhaler INHALE 2 PUFFS USING INHALER EVERY 4 HOURS AS NEEDED 6 Inhaler 3    fluticasone-salmeterol (ADVAIR) 250-50 MCG/DOSE AEPB Inhale 1 puff into the lungs every 12 hours.  (Patient taking differently: Inhale 1 puff into the lungs every 12 hours as needed ) 60 each 5    quinapril (ACCUPRIL) 40 MG tablet Take 1 tablet by mouth

## 2018-03-14 NOTE — PROGRESS NOTES
[address]   INPATIENT OCCUPATIONAL THERAPY  PROGRESS NOTE  Date: 3/14/2018  Patient Name: Anthony Givens      Room: 8-  MRN: [id number]    : 1940  ([de-identified] y.o.) Gender: male      Diagnosis: pt admit 2-21 with a fall, CT head, neck and pelvis negative, septicemia 2/2 pneumonia vs UTI,sepsis, COPD, CT L elbow to r/o septic arthritis; PMHx of 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, plan for LUIS  General  Chart Reviewed: Yes  Patient assessed for rehabilitation services?: Yes  Additional Pertinent Hx: Overnight had some combativeness and rstraints - now with One-On-One with better cooperation , but still confused   Response to previous treatment: Patient with no complaints from previous session  Family / Caregiver Present: No  Diagnosis: pt admit 2-21 with a fall, CT head, neck and pelvis negative, septicemia 2/2 pneumonia vs UTI,sepsis, COPD, CT L elbow to r/o septic arthritis; PMHx of 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, plan for LUIS    Restrictions  Restrictions/Precautions: Fall Risk, Contact Precautions, Isolation, Cardiac, General Precautions  Other position/activity restrictions: on nc oxygen  Position Activity Restriction  Other position/activity restrictions: on nc oxygen       Subjective  Subjective: pt sitting EOB upon arrival, pt agreeable to session  Comments: pt alert and cooperative  Patient Currently in Pain: Yes (states \"yes\" but unable to provide number)  Overall Orientation Status: Impaired  Orientation Level: Oriented to person;Disoriented to place; Disoriented to time;Disoriented to situation  Patient Observation  Observations: efraín, 1:1 sitter in room    Objective  Cognition  Overall Cognitive Status: Impaired  Following Directions:  Follows one step commands (c vc's)  Memory: Decreased short term memory  Sequencing and Organization: Assistance required to identify errors made;Assistance required to generate solutions (ADL tasks, using AE)  Bed mobility  Comment: pt sitting EOB upon Training, Self-Care / ADL, Patient/Caregiver Education & Training, Safety Education & Training, Cognitive/Perceptual Training, Strengthening      Goals  Short term goals  Time Frame for Short term goals: 1 week  Short term goal 1: set up UE bathe and dress  Short term goal 2: mod LE bathe and dress  Short term goal 3: wale 6-8 min stand, amb for adls with CGA and RW  Short term goal 4: wale 5 reps x 4 BUE shld ex with sats above 90    OT Individual Minutes  Time In: 6367  Time Out: 2443  Minutes: 31       03/14/18 1319   OT Individual Minutes   Time In 5171   Time Out 1045   Minutes 31     Electronically signed by AJ Villanueva on 3/14/18 at 1:28 PM

## 2018-03-15 ENCOUNTER — CARE COORDINATOR VISIT (OUTPATIENT)
Dept: CASE MANAGEMENT | Age: 78
End: 2018-03-15

## 2018-03-15 PROBLEM — E43 SEVERE MALNUTRITION (HCC): Status: ACTIVE | Noted: 2018-03-15

## 2018-03-15 PROBLEM — S32.040A CLOSED COMPRESSION FRACTURE OF FOURTH LUMBAR VERTEBRA (HCC): Status: ACTIVE | Noted: 2018-03-15

## 2018-03-15 PROBLEM — M80.00XD AGE-RELATED OSTEOPOROSIS WITH CURRENT PATHOLOGICAL FRACTURE WITH ROUTINE HEALING: Status: ACTIVE | Noted: 2018-03-15

## 2018-03-15 LAB
ABSOLUTE EOS #: 0.1 K/UL (ref 0–0.4)
ABSOLUTE IMMATURE GRANULOCYTE: ABNORMAL K/UL (ref 0–0.3)
ABSOLUTE LYMPH #: 1 K/UL (ref 1–4.8)
ABSOLUTE MONO #: 0.5 K/UL (ref 0.1–1.3)
ANION GAP SERPL CALCULATED.3IONS-SCNC: 8 MMOL/L (ref 9–17)
BASOPHILS # BLD: 0 % (ref 0–2)
BASOPHILS ABSOLUTE: 0 K/UL (ref 0–0.2)
BUN BLDV-MCNC: 16 MG/DL (ref 8–23)
BUN/CREAT BLD: ABNORMAL (ref 9–20)
CALCIUM SERPL-MCNC: 8.9 MG/DL (ref 8.6–10.4)
CHLORIDE BLD-SCNC: 100 MMOL/L (ref 98–107)
CO2: 32 MMOL/L (ref 20–31)
CREAT SERPL-MCNC: 0.75 MG/DL (ref 0.7–1.2)
DIFFERENTIAL TYPE: ABNORMAL
EOSINOPHILS RELATIVE PERCENT: 3 % (ref 0–4)
GFR AFRICAN AMERICAN: >60 ML/MIN
GFR NON-AFRICAN AMERICAN: >60 ML/MIN
GFR SERPL CREATININE-BSD FRML MDRD: ABNORMAL ML/MIN/{1.73_M2}
GFR SERPL CREATININE-BSD FRML MDRD: ABNORMAL ML/MIN/{1.73_M2}
GLUCOSE BLD-MCNC: 118 MG/DL (ref 75–110)
GLUCOSE BLD-MCNC: 174 MG/DL (ref 75–110)
GLUCOSE BLD-MCNC: 176 MG/DL (ref 70–99)
GLUCOSE BLD-MCNC: 209 MG/DL (ref 75–110)
GLUCOSE BLD-MCNC: 314 MG/DL (ref 75–110)
HCT VFR BLD CALC: 36.5 % (ref 41–53)
HEMOGLOBIN: 12.2 G/DL (ref 13.5–17.5)
IMMATURE GRANULOCYTES: ABNORMAL %
LYMPHOCYTES # BLD: 17 % (ref 24–44)
MCH RBC QN AUTO: 32.9 PG (ref 26–34)
MCHC RBC AUTO-ENTMCNC: 33.5 G/DL (ref 31–37)
MCV RBC AUTO: 98.3 FL (ref 80–100)
MONOCYTES # BLD: 8 % (ref 1–7)
NRBC AUTOMATED: ABNORMAL PER 100 WBC
PDW BLD-RTO: 13.2 % (ref 11.5–14.9)
PLATELET # BLD: 179 K/UL (ref 150–450)
PLATELET ESTIMATE: ABNORMAL
PMV BLD AUTO: 8.1 FL (ref 6–12)
POTASSIUM SERPL-SCNC: 3.7 MMOL/L (ref 3.7–5.3)
RBC # BLD: 3.71 M/UL (ref 4.5–5.9)
RBC # BLD: ABNORMAL 10*6/UL
SEG NEUTROPHILS: 72 % (ref 36–66)
SEGMENTED NEUTROPHILS ABSOLUTE COUNT: 4.2 K/UL (ref 1.3–9.1)
SODIUM BLD-SCNC: 140 MMOL/L (ref 135–144)
WBC # BLD: 5.8 K/UL (ref 3.5–11)
WBC # BLD: ABNORMAL 10*3/UL

## 2018-03-15 PROCEDURE — 6370000000 HC RX 637 (ALT 250 FOR IP): Performed by: RADIOLOGY

## 2018-03-15 PROCEDURE — 6370000000 HC RX 637 (ALT 250 FOR IP): Performed by: PSYCHIATRY & NEUROLOGY

## 2018-03-15 PROCEDURE — 97110 THERAPEUTIC EXERCISES: CPT

## 2018-03-15 PROCEDURE — 6360000002 HC RX W HCPCS: Performed by: STUDENT IN AN ORGANIZED HEALTH CARE EDUCATION/TRAINING PROGRAM

## 2018-03-15 PROCEDURE — 97116 GAIT TRAINING THERAPY: CPT

## 2018-03-15 PROCEDURE — 99223 1ST HOSP IP/OBS HIGH 75: CPT | Performed by: ORTHOPAEDIC SURGERY

## 2018-03-15 PROCEDURE — 36415 COLL VENOUS BLD VENIPUNCTURE: CPT

## 2018-03-15 PROCEDURE — 6370000000 HC RX 637 (ALT 250 FOR IP): Performed by: INTERNAL MEDICINE

## 2018-03-15 PROCEDURE — 6370000000 HC RX 637 (ALT 250 FOR IP): Performed by: NURSE PRACTITIONER

## 2018-03-15 PROCEDURE — 80048 BASIC METABOLIC PNL TOTAL CA: CPT

## 2018-03-15 PROCEDURE — 85025 COMPLETE CBC W/AUTO DIFF WBC: CPT

## 2018-03-15 PROCEDURE — 6370000000 HC RX 637 (ALT 250 FOR IP): Performed by: STUDENT IN AN ORGANIZED HEALTH CARE EDUCATION/TRAINING PROGRAM

## 2018-03-15 PROCEDURE — 1200000000 HC SEMI PRIVATE

## 2018-03-15 PROCEDURE — 2580000003 HC RX 258: Performed by: STUDENT IN AN ORGANIZED HEALTH CARE EDUCATION/TRAINING PROGRAM

## 2018-03-15 PROCEDURE — 82947 ASSAY GLUCOSE BLOOD QUANT: CPT

## 2018-03-15 PROCEDURE — 99232 SBSQ HOSP IP/OBS MODERATE 35: CPT | Performed by: INTERNAL MEDICINE

## 2018-03-15 RX ORDER — LIDOCAINE 50 MG/G
1 PATCH TOPICAL DAILY
Qty: 30 PATCH | Refills: 0 | Status: SHIPPED | OUTPATIENT
Start: 2018-03-15 | End: 2018-08-03 | Stop reason: ALTCHOICE

## 2018-03-15 RX ORDER — QUETIAPINE FUMARATE 50 MG/1
50 TABLET, FILM COATED ORAL 2 TIMES DAILY
Qty: 60 TABLET | Refills: 3 | Status: SHIPPED | OUTPATIENT
Start: 2018-03-15 | End: 2018-08-03 | Stop reason: ALTCHOICE

## 2018-03-15 RX ORDER — FLUDROCORTISONE ACETATE 0.1 MG/1
0.1 TABLET ORAL DAILY
Qty: 30 TABLET | Refills: 3 | Status: SHIPPED | OUTPATIENT
Start: 2018-03-15 | End: 2018-04-14

## 2018-03-15 RX ORDER — RISPERIDONE 1 MG/1
1 TABLET, FILM COATED ORAL 2 TIMES DAILY
Qty: 60 TABLET | Refills: 3 | Status: SHIPPED | OUTPATIENT
Start: 2018-03-15 | End: 2018-08-03 | Stop reason: SDUPTHER

## 2018-03-15 RX ADMIN — INSULIN LISPRO 12 UNITS: 100 INJECTION, SOLUTION INTRAVENOUS; SUBCUTANEOUS at 22:19

## 2018-03-15 RX ADMIN — FINASTERIDE 5 MG: 5 TABLET, FILM COATED ORAL at 10:05

## 2018-03-15 RX ADMIN — Medication 10 ML: at 22:18

## 2018-03-15 RX ADMIN — SIMVASTATIN 20 MG: 20 TABLET, FILM COATED ORAL at 22:17

## 2018-03-15 RX ADMIN — Medication 40 UNITS: at 22:22

## 2018-03-15 RX ADMIN — INSULIN LISPRO 3 UNITS: 100 INJECTION, SOLUTION INTRAVENOUS; SUBCUTANEOUS at 17:13

## 2018-03-15 RX ADMIN — QUETIAPINE FUMARATE 50 MG: 50 TABLET, FILM COATED ORAL at 22:19

## 2018-03-15 RX ADMIN — INSULIN LISPRO 3 UNITS: 100 INJECTION, SOLUTION INTRAVENOUS; SUBCUTANEOUS at 12:01

## 2018-03-15 RX ADMIN — QUETIAPINE FUMARATE 50 MG: 50 TABLET, FILM COATED ORAL at 10:10

## 2018-03-15 RX ADMIN — Medication 10 ML: at 10:09

## 2018-03-15 RX ADMIN — RISPERIDONE 1 MG: 1 TABLET, FILM COATED ORAL at 10:10

## 2018-03-15 RX ADMIN — RISPERIDONE 1 MG: 1 TABLET, FILM COATED ORAL at 22:18

## 2018-03-15 RX ADMIN — ROPINIROLE HYDROCHLORIDE 2 MG: 2 TABLET, FILM COATED ORAL at 22:18

## 2018-03-15 RX ADMIN — ENOXAPARIN SODIUM 40 MG: 40 INJECTION SUBCUTANEOUS at 10:06

## 2018-03-15 RX ADMIN — FLUDROCORTISONE ACETATE 0.1 MG: 0.1 TABLET ORAL at 10:10

## 2018-03-15 ASSESSMENT — PAIN SCALES - GENERAL
PAINLEVEL_OUTOF10: 9
PAINLEVEL_OUTOF10: 9

## 2018-03-15 ASSESSMENT — PAIN DESCRIPTION - ORIENTATION
ORIENTATION: LOWER;MID
ORIENTATION: RIGHT;LEFT

## 2018-03-15 ASSESSMENT — PAIN DESCRIPTION - ONSET
ONSET: ON-GOING
ONSET: ON-GOING

## 2018-03-15 ASSESSMENT — PAIN DESCRIPTION - DESCRIPTORS
DESCRIPTORS: DISCOMFORT;PRESSURE
DESCRIPTORS: DISCOMFORT;BURNING;PRESSURE

## 2018-03-15 ASSESSMENT — PAIN DESCRIPTION - LOCATION
LOCATION: BACK
LOCATION: BUTTOCKS

## 2018-03-15 ASSESSMENT — PAIN DESCRIPTION - PAIN TYPE
TYPE: ACUTE PAIN
TYPE: ACUTE PAIN

## 2018-03-15 ASSESSMENT — ENCOUNTER SYMPTOMS
BACK PAIN: 1
SHORTNESS OF BREATH: 1

## 2018-03-15 NOTE — PROGRESS NOTES
to use at 2 liters per minute.  Osteoarthritis     Paralysis of vocal cords     Paralyzed left vocal cord, idiopathic    Restless legs syndrome     controlled with Requip    SOB (shortness of breath)     Type II or unspecified type diabetes mellitus without mention of complication, not stated as uncontrolled     controlled on oral agents    Wears glasses     for reading      Past Surgical History:   Procedure Laterality Date    ABDOMINAL AORTIC ANEURYSM REPAIR  06/26/2013    BACK SURGERY      tumor    BLADDER SURGERY      bx    CATARACT REMOVAL Right     CHOLECYSTECTOMY      CYST REMOVAL      buttocks, benign    CYSTOSCOPY W BIOPSY OF BLADDER N/A 4/28/2017    CYSTOSCOPY BLADDER BIOPSY FULGERATION performed by Annmarie Dubon MD at [address] [place] [address] Left 12/2015    HERNIA REPAIR      Inguinal herniorrhaphy    KIDNEY STONE SURGERY      extraction of kidney stones    LARYNGOSCOPY      NOSE SURGERY      SKIN BIOPSY  2008    back---bcc    SKIN CANCER EXCISION Right 10/18/2016    basal cell rt. cheek    SMALL INTESTINE SURGERY      \"a long time ago, i had 16 inches removed\"          Admission Meds  No current facility-administered medications on file prior to encounter. Current Outpatient Prescriptions on File Prior to Encounter   Medication Sig Dispense Refill    finasteride (PROSCAR) 5 MG tablet TAKE 1 TABLET BY MOUTH DAILY 90 tablet 3    rOPINIRole (REQUIP) 2 MG tablet TAKE ONE TABLET BY MOUTH EVERY EVENING AS DIRECTED 90 tablet 1    lovastatin (MEVACOR) 40 MG tablet TAKE 1 TABLET BY MOUTH NIGHTLY 90 tablet 1    PROAIR  (90 BASE) MCG/ACT inhaler INHALE 2 PUFFS USING INHALER EVERY 4 HOURS AS NEEDED 6 Inhaler 3    fluticasone-salmeterol (ADVAIR) 250-50 MCG/DOSE AEPB Inhale 1 puff into the lungs every 12 hours.  (Patient taking differently: Inhale 1 puff into the lungs every 12 hours as needed ) 60 each 5    B-D UF III MINI PEN ,no DVT     Assessment:   Staph aureus bacteremia ,possible pulmonary source. Sepsis secondary to above resolved. Encephalopathy likely metabolic . Active Problems:    COPD (chronic obstructive pulmonary disease) (HCC)    Hyperlipidemia    Hypertension    Diabetes mellitus type 2, insulin dependent (HCC)    Thrombocytopenia ([facility]  H/o Bladder CA  Recommendations:     Off ABXS   Remove midline and Castaneda prior to discharge. Britni Meyer  Attending Physician Statement  I have discussed the care of the patient, including pertinent history and exam findings,  with the resident. I have reviewed the key elements of all parts of the encounter with the resident. I agree with the assessment, plan and orders as documented by the resident.     Wale Stock

## 2018-03-15 NOTE — PROGRESS NOTES
Spoke with CY Robertson regarding new xrays showing a mild compression fracture of L4 for this pt. Consult ordered to orthopedic surgery for Dr. Bg Rivas.

## 2018-03-15 NOTE — PROGRESS NOTES
(Estimated Nutrition Needs):  · Estimated Daily Total Kcal: 3067-3801  · Estimated Daily Protein (g): 81-87    Estimated Intake vs Estimated Needs: Intake Less Than Needs    Nutrition Risk Level: High    Nutrition Interventions:   Continue current diet, Start ONS  Continued Inpatient Monitoring    Nutrition Evaluation:   · Evaluation: Progress towards goals declining   · Goals: adequate nutriton provision    · Monitoring: Meal Intake, Supplement Intake, Weight, Pertinent Labs, Diet Tolerance, Skin Integrity, Mental Status/Confusion    See Adult Nutrition Doc Flowsheet for more detail.      Renetta CABA RANDREI, L.D,  Clinical Dietitian  Pager # [phone]

## 2018-03-15 NOTE — CONSULTS
are normal.   Neck: Normal range of motion. Pulmonary/Chest: Effort normal. No respiratory distress. Neurological: He is alert and oriented to person, place, and time. He has normal strength. No sensory deficit. Normal gait   Skin: Skin is warm and dry. Psychiatric: His behavior is normal. Thought content normal.   Nursing note and vitals reviewed. Drains:No  Imaging:Yes L4 sep compression fracture age ? Medications:    lidocaine  1 patch Transdermal Daily    lidocaine  1 patch Transdermal Nightly    fludrocortisone  0.1 mg Oral Daily    QUEtiapine  50 mg Oral BID    risperiDONE  1 mg Oral BID    metoprolol tartrate  12.5 mg Oral BID    insulin glargine  40 Units Subcutaneous Nightly    insulin lispro  0-18 Units Subcutaneous 4x Daily AC & HS    rOPINIRole  2 mg Oral Nightly    sodium chloride flush  10 mL Intravenous 2 times per day    enoxaparin  40 mg Subcutaneous Daily    finasteride  5 mg Oral Daily    simvastatin  20 mg Oral Nightly     PRN Meds:OLANZapine **AND** sterile water, hydrALAZINE, ondansetron, sodium chloride flush, acetaminophen, sodium phosphate IVPB **OR** sodium phosphate IVPB, potassium chloride **OR** potassium chloride **OR** potassium chloride, magnesium sulfate, glucose, dextrose, glucagon (rDNA), dextrose      Data:  CBC: Recent Labs      03/13/18   0900  03/14/18   0714  03/15/18   1015   WBC  6.5  6.8  5.8   RBC  3.56*  3.73*  3.71*   HGB  11.7*  12.4*  12.2*   HCT  35.0*  37.1*  36.5*   MCV  98.5  99.6  98.3   RDW  13.6  13.5  13.2   PLT  136*  147*  179     BNP: No results for input(s): BNP in the last 72 hours. PT/INR: No results for input(s): PROTIME, INR in the last 72 hours.     Assessment:     Principal Problem:    Sepsis ([address]  Active Problems:    COPD (chronic obstructive pulmonary disease) (HCC)    Hyperlipidemia    Hypertension    Diabetes mellitus type 2, insulin dependent (HCC)    Thrombocytopenia (HCC)    Bacteremia    Closed compression

## 2018-03-15 NOTE — PROGRESS NOTES
Physical Therapy  [facility] Fan   Physical Therapy Progress Note    Date: 3/15/18  Patient Name: Sharyon Gilford       Room: 8-  MRN: [id number]   Account: [de-identified]   : 1940  ([de-identified] y.o.)   Gender: male        Diagnosis: pt admit 2-21 with a fall, CT head, neck and pelvis negative, septicemia 2/2 pneumonia vs UTI,sepsis, COPD, CT L elbow to r/o septic arthritis; PMHx of 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, plan for LUIS  Restrictions/Precautions: Fall Risk, Contact Precautions, Isolation, Cardiac, General Precautions  Other position/activity restrictions: on nc oxygen   Past Medical History:  has a past medical history of Abdominal aortic [facility] (AAA) [facility] Alveolar hypoventilation; Asthma; BCC (basal cell carcinoma of skin); Bladder cancer [facility]); BPH (benign prostatic hyperplasia); Cataracts, bilateral; Chronic constipation; Chronic cor pulmonale ([facility] Chronic hypoxemic respiratory failure (HCC); COPD (chronic obstructive pulmonary disease) ([facility] Depression; Diverticulitis; GERD (gastroesophageal reflux disease); Hard of hearing; History of kidney stones; History of nasal obstruction; History of smoking; Hoarseness; Hyperlipidemia; Hypertension; Hypertrophy of nasal turbinates; Irritable bowel; MDRO (multiple drug resistant organisms) resistance; Mild obstructive sleep apnea; On supplemental oxygen therapy; Osteoarthritis; Paralysis of vocal cords; Restless legs syndrome; SOB (shortness of breath); Type II or unspecified type diabetes mellitus without mention of complication, not stated as uncontrolled; and Wears glasses. Past Surgical History:   has a past surgical history that includes Cholecystectomy; Kidney stone surgery; back surgery; Bladder surgery; Cystourethroscopy/Urethral Dilation; Cataract removal (Right); Abdominal aortic aneurysm repair (2013); laryngoscopy; skin biopsy ();  Skin cancer excision (Right, 10/18/2016); hernia repair; Elbow surgery (Left, 2015); unsupported)  Sitting - Dynamic: Fair;+ (EOB, UE support)  Standing - Static: Fair;- (RW)  Standing - Dynamic: Poor (RW)        Other exercises?: Yes  Other exercises 2: Supine B LE ther ex, 15x each (Pt became progressively drowsy, did not respond to cues)  Other Activities  Other Activities: Dangling protocol       Assessment  Activity Tolerance: (P) Patient limited by endurance; Patient limited by fatigue (Fatigues quickly)   Body structures, Functions, Activity limitations: Decreased endurance;Decreased functional mobility   Assessment: Decreased response with fatigue. Prognosis: Good  Discharge Recommendations: Subacute/Skilled Nursing Facility        Type of devices: Gait belt;Sitter present; All fall risk precautions in place; Left in bed;Call light within reach       Plan  Times per week: 6-7x/week  Times per day: Daily  Current Treatment Recommendations: Strengthening, Balance Training, Functional Mobility Training, Transfer Training, Gait Training      Patient Education  New Education Provided: LAURY safety  Learner:patient  Method: demonstration and explanation       Outcome: acknowledged understanding of and needs reinforcement       Goals  Short term goals  Time Frame for Short term goals: 10 visits  Short term goal 1: Improve strength in both LE to 4+/5  Short term goal 2: Independent in sit to stand  Short term goal 3: Independent in ambulation 40 feet with a rolling walker.   Short term goal 4: Improve standing balance to good  Long term goals  Time Frame for Long term goals : 8 sessions  Long term goal 1: Independent ambulation 120 feet with rolling walker      PT Individual Minutes  Time In: 5702  Time Out: 8099  Minutes: 27      Electronically signed by Windy Adams PTA on 3/15/18 at 4:21 PM

## 2018-03-15 NOTE — PROGRESS NOTES
Pulmonary Progress Note  [facility]      Patient - Carmen Ball,  Age - [de-identified] y.o.    - 1940      Room Number - -01   N -  [id number]   Acct # - [de-identified]  Date of Admission -  2018  2:48 PM        Consulting [address] [place] [address],[address], MD  Primary Care Physician - Jeremie Bowen, DO     SUBJECTIVE   Continues to say he had a rough night, respiratory therapy notes that he is not wearing his oxygen    OBJECTIVE   VITALS    height is 5' 7\" (1.702 m) and weight is 176 lb 12.9 oz (80.2 kg). His oral temperature is 98.6 °F (37 °C). His blood pressure is 88/52 (abnormal) and his pulse is 94. His respiration is 18 and oxygen saturation is 96%. Body mass index is 27.69 kg/m². Temperature Range: Temp: 98.6 °F (37 °C) Temp  Av.8 °F (37.1 °C)  Min: 98.4 °F (36.9 °C)  Max: 99.5 °F (37.5 °C)  BP Range:  Systolic (56LPP), JVY:220 , Min:88 , AZF:609     Diastolic (32IQM), ELD:11, Min:46, Max:83    Pulse Range: Pulse  Av.3  Min: 43  Max: 96  Respiration Range: Resp  Av  Min: 18  Max: 20  Current Pulse Ox[de-identified]  SpO2: 96 %  24HR Pulse Ox Range:  SpO2  Av.4 %  Min: 85 %  Max: 96 %  Oxygen Amount and Delivery: O2 Flow Rate (L/min): 2 L/min    Wt Readings from Last 3 Encounters:   18 176 lb 12.9 oz (80.2 kg)   17 194 lb 9.6 oz (88.3 kg)   17 180 lb (81.6 kg)       I/O (24 Hours)    Intake/Output Summary (Last 24 hours) at 03/15/18 1048  Last data filed at 03/15/18 0447   Gross per 24 hour   Intake               10 ml   Output             1825 ml   Net            -1815 ml       EXAM     General Appearance  Awake, alert, oriented, in no acute distress  HEENT - normocephalic, atraumatic.  []  Mallampati  [] Crowded airway   [] Macroglossia  []  Retrognathia  [] Micrognathia  []  Normal tongue size []  Normal Bite  [] Bebo sign positive    Neck - Supple,  trachea midline   Lungs - Diminished but clear  Cardiovascular - Heart sounds are normal.  Regular rate and rhythm   Abdomen - Soft, nontender, nondistended, no masses or organomegaly  Neurologic - There are no focal motor or sensory deficits  Skin - No bruising or bleeding  Extremities - No clubbing, cyanosis, edema    MEDS      lidocaine  1 patch Transdermal Daily    lidocaine  1 patch Transdermal Nightly    fludrocortisone  0.1 mg Oral Daily    QUEtiapine  50 mg Oral BID    risperiDONE  1 mg Oral BID    metoprolol tartrate  12.5 mg Oral BID    insulin glargine  40 Units Subcutaneous Nightly    insulin lispro  0-18 Units Subcutaneous 4x Daily AC & HS    rOPINIRole  2 mg Oral Nightly    sodium chloride flush  10 mL Intravenous 2 times per day    enoxaparin  40 mg Subcutaneous Daily    finasteride  5 mg Oral Daily    simvastatin  20 mg Oral Nightly      dextrose       OLANZapine **AND** sterile water, hydrALAZINE, ondansetron, sodium chloride flush, acetaminophen, sodium phosphate IVPB **OR** sodium phosphate IVPB, potassium chloride **OR** potassium chloride **OR** potassium chloride, magnesium sulfate, glucose, dextrose, glucagon (rDNA), dextrose    LABS   CBC   Recent Labs      03/15/18   1015   WBC  5.8   HGB  12.2*   HCT  36.5*   MCV  98.3   PLT  179     BMP: Lab Results   Component Value Date     03/14/2018    K 4.2 03/14/2018     03/14/2018    CO2 26 03/14/2018    BUN 18 03/14/2018    LABALBU 3.4 03/07/2018    CREATININE 0.71 03/14/2018    CALCIUM 9.1 03/14/2018    GFRAA >60 03/14/2018    LABGLOM >60 03/14/2018     ABGs:  Lab Results   Component Value Date    PHART 7.429 03/12/2018    PO2ART 52.7 03/12/2018    AHR6YRP 44.4 03/12/2018    Lab Results   Component Value Date    MODE NOT REPORTED 03/12/2018     Ionized Calcium:  No results found for: IONCA  Magnesium:    Lab Results   Component Value Date    MG 2.5 02/26/2018     Phosphorus:    Lab Results   Component Value Date    PHOS 2.1 02/26/2018        LIVER PROFILE No results for input(s): AST, ALT, LIPASE, BILIDIR, BILITOT, ALKPHOS in the last 72 hours. Invalid input(s): AMYLASE,  ALB  INR No results for input(s): INR in the last 72 hours. PTT   Lab Results   Component Value Date    APTT 30.3 02/23/2018         RADIOLOGY     (See actual reports for details)    ASSESSMENT/PLAN   Principal Problem:    Sepsis ([facility]  Active Problems:    COPD (chronic obstructive pulmonary disease) ([facility]    Hyperlipidemia    Hypertension    Diabetes mellitus type 2, insulin dependent (HCC)    Thrombocytopenia ([facility]    Bacteremia  Resolved Problems:    * No resolved hospital problems.  *    Stable pulmonary status  Okay for discharge  Feels some of his confusion is because he is not wearing his oxygen  Electronically signed by Elizabeth Hinson MD on 3/15/2018 at 10:48 AM

## 2018-03-16 LAB
ABSOLUTE EOS #: 0.2 K/UL (ref 0–0.4)
ABSOLUTE IMMATURE GRANULOCYTE: ABNORMAL K/UL (ref 0–0.3)
ABSOLUTE LYMPH #: 1 K/UL (ref 1–4.8)
ABSOLUTE MONO #: 0.5 K/UL (ref 0.1–1.3)
ANION GAP SERPL CALCULATED.3IONS-SCNC: 12 MMOL/L (ref 9–17)
BASOPHILS # BLD: 0 % (ref 0–2)
BASOPHILS ABSOLUTE: 0 K/UL (ref 0–0.2)
BUN BLDV-MCNC: 20 MG/DL (ref 8–23)
BUN/CREAT BLD: ABNORMAL (ref 9–20)
CALCIUM SERPL-MCNC: 9.2 MG/DL (ref 8.6–10.4)
CHLORIDE BLD-SCNC: 103 MMOL/L (ref 98–107)
CO2: 29 MMOL/L (ref 20–31)
CREAT SERPL-MCNC: 0.77 MG/DL (ref 0.7–1.2)
DIFFERENTIAL TYPE: ABNORMAL
EOSINOPHILS RELATIVE PERCENT: 4 % (ref 0–4)
GFR AFRICAN AMERICAN: >60 ML/MIN
GFR NON-AFRICAN AMERICAN: >60 ML/MIN
GFR SERPL CREATININE-BSD FRML MDRD: ABNORMAL ML/MIN/{1.73_M2}
GFR SERPL CREATININE-BSD FRML MDRD: ABNORMAL ML/MIN/{1.73_M2}
GLUCOSE BLD-MCNC: 198 MG/DL (ref 75–110)
GLUCOSE BLD-MCNC: 221 MG/DL (ref 70–99)
GLUCOSE BLD-MCNC: 225 MG/DL (ref 75–110)
GLUCOSE BLD-MCNC: 253 MG/DL (ref 75–110)
GLUCOSE BLD-MCNC: 260 MG/DL (ref 75–110)
HCT VFR BLD CALC: 36 % (ref 41–53)
HEMOGLOBIN: 12.3 G/DL (ref 13.5–17.5)
IMMATURE GRANULOCYTES: ABNORMAL %
LYMPHOCYTES # BLD: 20 % (ref 24–44)
MCH RBC QN AUTO: 33.8 PG (ref 26–34)
MCHC RBC AUTO-ENTMCNC: 34.3 G/DL (ref 31–37)
MCV RBC AUTO: 98.8 FL (ref 80–100)
MONOCYTES # BLD: 10 % (ref 1–7)
NRBC AUTOMATED: ABNORMAL PER 100 WBC
PDW BLD-RTO: 13.5 % (ref 11.5–14.9)
PLATELET # BLD: 176 K/UL (ref 150–450)
PLATELET ESTIMATE: ABNORMAL
PMV BLD AUTO: 8.4 FL (ref 6–12)
POTASSIUM SERPL-SCNC: 3.8 MMOL/L (ref 3.7–5.3)
RBC # BLD: 3.64 M/UL (ref 4.5–5.9)
RBC # BLD: ABNORMAL 10*6/UL
SEG NEUTROPHILS: 66 % (ref 36–66)
SEGMENTED NEUTROPHILS ABSOLUTE COUNT: 3.4 K/UL (ref 1.3–9.1)
SODIUM BLD-SCNC: 144 MMOL/L (ref 135–144)
WBC # BLD: 5.2 K/UL (ref 3.5–11)
WBC # BLD: ABNORMAL 10*3/UL

## 2018-03-16 PROCEDURE — 6360000002 HC RX W HCPCS: Performed by: STUDENT IN AN ORGANIZED HEALTH CARE EDUCATION/TRAINING PROGRAM

## 2018-03-16 PROCEDURE — 80048 BASIC METABOLIC PNL TOTAL CA: CPT

## 2018-03-16 PROCEDURE — 6370000000 HC RX 637 (ALT 250 FOR IP): Performed by: RADIOLOGY

## 2018-03-16 PROCEDURE — 6370000000 HC RX 637 (ALT 250 FOR IP): Performed by: STUDENT IN AN ORGANIZED HEALTH CARE EDUCATION/TRAINING PROGRAM

## 2018-03-16 PROCEDURE — 97535 SELF CARE MNGMENT TRAINING: CPT

## 2018-03-16 PROCEDURE — 97116 GAIT TRAINING THERAPY: CPT

## 2018-03-16 PROCEDURE — 82947 ASSAY GLUCOSE BLOOD QUANT: CPT

## 2018-03-16 PROCEDURE — 36415 COLL VENOUS BLD VENIPUNCTURE: CPT

## 2018-03-16 PROCEDURE — 6370000000 HC RX 637 (ALT 250 FOR IP): Performed by: INTERNAL MEDICINE

## 2018-03-16 PROCEDURE — 1200000000 HC SEMI PRIVATE

## 2018-03-16 PROCEDURE — 6370000000 HC RX 637 (ALT 250 FOR IP): Performed by: PSYCHIATRY & NEUROLOGY

## 2018-03-16 PROCEDURE — 2580000003 HC RX 258: Performed by: STUDENT IN AN ORGANIZED HEALTH CARE EDUCATION/TRAINING PROGRAM

## 2018-03-16 PROCEDURE — 85025 COMPLETE CBC W/AUTO DIFF WBC: CPT

## 2018-03-16 PROCEDURE — 6370000000 HC RX 637 (ALT 250 FOR IP): Performed by: NURSE PRACTITIONER

## 2018-03-16 PROCEDURE — 99232 SBSQ HOSP IP/OBS MODERATE 35: CPT | Performed by: INTERNAL MEDICINE

## 2018-03-16 RX ADMIN — SIMVASTATIN 20 MG: 20 TABLET, FILM COATED ORAL at 20:07

## 2018-03-16 RX ADMIN — ACETAMINOPHEN 650 MG: 325 TABLET, FILM COATED ORAL at 12:28

## 2018-03-16 RX ADMIN — ACETAMINOPHEN 650 MG: 325 TABLET, FILM COATED ORAL at 20:05

## 2018-03-16 RX ADMIN — ROPINIROLE HYDROCHLORIDE 2 MG: 2 TABLET, FILM COATED ORAL at 20:10

## 2018-03-16 RX ADMIN — ACETAMINOPHEN 650 MG: 325 TABLET, FILM COATED ORAL at 07:18

## 2018-03-16 RX ADMIN — METOPROLOL TARTRATE 12.5 MG: 25 TABLET ORAL at 07:18

## 2018-03-16 RX ADMIN — QUETIAPINE FUMARATE 50 MG: 50 TABLET, FILM COATED ORAL at 20:09

## 2018-03-16 RX ADMIN — Medication 10 ML: at 20:06

## 2018-03-16 RX ADMIN — ENOXAPARIN SODIUM 40 MG: 40 INJECTION SUBCUTANEOUS at 07:19

## 2018-03-16 RX ADMIN — QUETIAPINE FUMARATE 50 MG: 50 TABLET, FILM COATED ORAL at 07:19

## 2018-03-16 RX ADMIN — RISPERIDONE 1 MG: 1 TABLET, FILM COATED ORAL at 07:19

## 2018-03-16 RX ADMIN — INSULIN LISPRO 9 UNITS: 100 INJECTION, SOLUTION INTRAVENOUS; SUBCUTANEOUS at 12:28

## 2018-03-16 RX ADMIN — FINASTERIDE 5 MG: 5 TABLET, FILM COATED ORAL at 07:18

## 2018-03-16 RX ADMIN — METOPROLOL TARTRATE 12.5 MG: 25 TABLET ORAL at 20:07

## 2018-03-16 RX ADMIN — INSULIN LISPRO 3 UNITS: 100 INJECTION, SOLUTION INTRAVENOUS; SUBCUTANEOUS at 07:19

## 2018-03-16 RX ADMIN — Medication 40 UNITS: at 21:40

## 2018-03-16 RX ADMIN — INSULIN LISPRO 6 UNITS: 100 INJECTION, SOLUTION INTRAVENOUS; SUBCUTANEOUS at 18:32

## 2018-03-16 RX ADMIN — FLUDROCORTISONE ACETATE 0.1 MG: 0.1 TABLET ORAL at 07:19

## 2018-03-16 RX ADMIN — Medication 10 ML: at 07:20

## 2018-03-16 RX ADMIN — RISPERIDONE 1 MG: 1 TABLET, FILM COATED ORAL at 20:10

## 2018-03-16 ASSESSMENT — PAIN DESCRIPTION - PAIN TYPE
TYPE: CHRONIC PAIN

## 2018-03-16 ASSESSMENT — PAIN SCALES - GENERAL
PAINLEVEL_OUTOF10: 9
PAINLEVEL_OUTOF10: 8
PAINLEVEL_OUTOF10: 8
PAINLEVEL_OUTOF10: 9
PAINLEVEL_OUTOF10: 8
PAINLEVEL_OUTOF10: 9
PAINLEVEL_OUTOF10: 7

## 2018-03-16 ASSESSMENT — PAIN DESCRIPTION - ORIENTATION
ORIENTATION: LOWER
ORIENTATION: LOWER;MID;UPPER

## 2018-03-16 ASSESSMENT — PAIN DESCRIPTION - LOCATION
LOCATION: BACK

## 2018-03-16 ASSESSMENT — PAIN DESCRIPTION - PROGRESSION: CLINICAL_PROGRESSION: NOT CHANGED

## 2018-03-16 ASSESSMENT — PAIN DESCRIPTION - DESCRIPTORS: DESCRIPTORS: ACHING

## 2018-03-16 NOTE — PROGRESS NOTES
assistance  Supine to Sit: Minimal assistance  Sit to Supine: Minimal assistance  Scooting: Minimal assistance (towards EOB)    Balance  Sitting Balance: Supervision (tolerated sitting unsupported EOB x ~45 minutes)  Standing Balance: Contact guard assistance    Standing Balance  Time: 5-6 minutes c RW, pt became fatigued and req sitting RB  Activity: bed>sink to initiate shaving>bed  Sit to stand: Contact guard assistance  Stand to sit: Contact guard assistance  Comment: VCs for hand placement c G return    Functional Mobility  Functional - Mobility Device: Rolling Walker  Activity: To/from bathroom  Assist Level: Contact guard assistance  Functional Mobility Comments: no LOB noted, VCs to stay within RW c G return   ADL  Feeding: Increased time to complete;Setup (per 1:1)  Grooming: Setup;Verbal cueing; Increased time to complete (completed sitting EOB, included shave)  UE Bathing: Setup;Verbal cueing; Increased time to complete  LE Bathing: Minimal assistance;Setup;Verbal cueing; Increased time to complete (A to wash/dry buttocks)  UE Dressing: Setup (hospital gown)  LE Dressing: Setup;Verbal cueing; Increased time to complete;Supervision ( pt doff footies, lui slippers without AE)  Toileting:  (has efraín)  Additional Comments: extended time to complete tasks 2* slow pace and pt talkative, req multiple VCs for redirection     Transfers  Sit to stand: Contact guard assistance  Stand to sit: Contact guard assistance  Fine Motor: using hands well during functional care tasks        Assessment  Performance deficits / Impairments: Decreased safe awareness;Decreased cognition;Decreased functional mobility ; Decreased ADL status; Decreased endurance  Prognosis: Good  Discharge Recommendations: Subacute/Skilled Nursing Facility  Activity Tolerance: Patient limited by fatigue;Patient limited by pain  Activity Tolerance: seated EOB for lunch   Safety Devices in place: Yes  Type of devices: Patient at risk for falls;Call light

## 2018-03-17 LAB
ABSOLUTE EOS #: 0.3 K/UL (ref 0–0.4)
ABSOLUTE IMMATURE GRANULOCYTE: ABNORMAL K/UL (ref 0–0.3)
ABSOLUTE LYMPH #: 0.9 K/UL (ref 1–4.8)
ABSOLUTE MONO #: 0.5 K/UL (ref 0.1–1.3)
ANION GAP SERPL CALCULATED.3IONS-SCNC: 13 MMOL/L (ref 9–17)
BASOPHILS # BLD: 0 % (ref 0–2)
BASOPHILS ABSOLUTE: 0 K/UL (ref 0–0.2)
BUN BLDV-MCNC: 20 MG/DL (ref 8–23)
BUN/CREAT BLD: ABNORMAL (ref 9–20)
CALCIUM SERPL-MCNC: 9.3 MG/DL (ref 8.6–10.4)
CHLORIDE BLD-SCNC: 100 MMOL/L (ref 98–107)
CO2: 30 MMOL/L (ref 20–31)
CREAT SERPL-MCNC: 0.72 MG/DL (ref 0.7–1.2)
DIFFERENTIAL TYPE: ABNORMAL
EOSINOPHILS RELATIVE PERCENT: 5 % (ref 0–4)
GFR AFRICAN AMERICAN: >60 ML/MIN
GFR NON-AFRICAN AMERICAN: >60 ML/MIN
GFR SERPL CREATININE-BSD FRML MDRD: ABNORMAL ML/MIN/{1.73_M2}
GFR SERPL CREATININE-BSD FRML MDRD: ABNORMAL ML/MIN/{1.73_M2}
GLUCOSE BLD-MCNC: 171 MG/DL (ref 75–110)
GLUCOSE BLD-MCNC: 193 MG/DL (ref 75–110)
GLUCOSE BLD-MCNC: 218 MG/DL (ref 70–99)
GLUCOSE BLD-MCNC: 254 MG/DL (ref 75–110)
GLUCOSE BLD-MCNC: 270 MG/DL (ref 75–110)
HCT VFR BLD CALC: 37.6 % (ref 41–53)
HEMOGLOBIN: 12.5 G/DL (ref 13.5–17.5)
IMMATURE GRANULOCYTES: ABNORMAL %
LYMPHOCYTES # BLD: 19 % (ref 24–44)
MCH RBC QN AUTO: 32.6 PG (ref 26–34)
MCHC RBC AUTO-ENTMCNC: 33.3 G/DL (ref 31–37)
MCV RBC AUTO: 97.9 FL (ref 80–100)
MONOCYTES # BLD: 9 % (ref 1–7)
NRBC AUTOMATED: ABNORMAL PER 100 WBC
PDW BLD-RTO: 13.1 % (ref 11.5–14.9)
PLATELET # BLD: 204 K/UL (ref 150–450)
PLATELET ESTIMATE: ABNORMAL
PMV BLD AUTO: 7.9 FL (ref 6–12)
POTASSIUM SERPL-SCNC: 3.9 MMOL/L (ref 3.7–5.3)
RBC # BLD: 3.84 M/UL (ref 4.5–5.9)
RBC # BLD: ABNORMAL 10*6/UL
SEG NEUTROPHILS: 67 % (ref 36–66)
SEGMENTED NEUTROPHILS ABSOLUTE COUNT: 3.4 K/UL (ref 1.3–9.1)
SODIUM BLD-SCNC: 143 MMOL/L (ref 135–144)
WBC # BLD: 5.1 K/UL (ref 3.5–11)
WBC # BLD: ABNORMAL 10*3/UL

## 2018-03-17 PROCEDURE — 6370000000 HC RX 637 (ALT 250 FOR IP): Performed by: RADIOLOGY

## 2018-03-17 PROCEDURE — 6370000000 HC RX 637 (ALT 250 FOR IP): Performed by: INTERNAL MEDICINE

## 2018-03-17 PROCEDURE — 82947 ASSAY GLUCOSE BLOOD QUANT: CPT

## 2018-03-17 PROCEDURE — 99232 SBSQ HOSP IP/OBS MODERATE 35: CPT | Performed by: INTERNAL MEDICINE

## 2018-03-17 PROCEDURE — 36415 COLL VENOUS BLD VENIPUNCTURE: CPT

## 2018-03-17 PROCEDURE — 80048 BASIC METABOLIC PNL TOTAL CA: CPT

## 2018-03-17 PROCEDURE — 6360000002 HC RX W HCPCS: Performed by: STUDENT IN AN ORGANIZED HEALTH CARE EDUCATION/TRAINING PROGRAM

## 2018-03-17 PROCEDURE — 6370000000 HC RX 637 (ALT 250 FOR IP): Performed by: PSYCHIATRY & NEUROLOGY

## 2018-03-17 PROCEDURE — 2580000003 HC RX 258: Performed by: STUDENT IN AN ORGANIZED HEALTH CARE EDUCATION/TRAINING PROGRAM

## 2018-03-17 PROCEDURE — 6370000000 HC RX 637 (ALT 250 FOR IP): Performed by: NURSE PRACTITIONER

## 2018-03-17 PROCEDURE — 6370000000 HC RX 637 (ALT 250 FOR IP): Performed by: STUDENT IN AN ORGANIZED HEALTH CARE EDUCATION/TRAINING PROGRAM

## 2018-03-17 PROCEDURE — 1200000000 HC SEMI PRIVATE

## 2018-03-17 PROCEDURE — 85025 COMPLETE CBC W/AUTO DIFF WBC: CPT

## 2018-03-17 RX ADMIN — INSULIN LISPRO 9 UNITS: 100 INJECTION, SOLUTION INTRAVENOUS; SUBCUTANEOUS at 11:46

## 2018-03-17 RX ADMIN — QUETIAPINE FUMARATE 50 MG: 50 TABLET, FILM COATED ORAL at 21:34

## 2018-03-17 RX ADMIN — ENOXAPARIN SODIUM 40 MG: 40 INJECTION SUBCUTANEOUS at 08:20

## 2018-03-17 RX ADMIN — SIMVASTATIN 20 MG: 20 TABLET, FILM COATED ORAL at 21:29

## 2018-03-17 RX ADMIN — Medication 40 UNITS: at 21:47

## 2018-03-17 RX ADMIN — INSULIN LISPRO 3 UNITS: 100 INJECTION, SOLUTION INTRAVENOUS; SUBCUTANEOUS at 17:18

## 2018-03-17 RX ADMIN — Medication 10 ML: at 08:24

## 2018-03-17 RX ADMIN — RISPERIDONE 1 MG: 1 TABLET, FILM COATED ORAL at 08:21

## 2018-03-17 RX ADMIN — METOPROLOL TARTRATE 12.5 MG: 25 TABLET ORAL at 08:20

## 2018-03-17 RX ADMIN — ACETAMINOPHEN 650 MG: 325 TABLET, FILM COATED ORAL at 06:25

## 2018-03-17 RX ADMIN — ACETAMINOPHEN 650 MG: 325 TABLET, FILM COATED ORAL at 11:46

## 2018-03-17 RX ADMIN — FINASTERIDE 5 MG: 5 TABLET, FILM COATED ORAL at 08:20

## 2018-03-17 RX ADMIN — INSULIN LISPRO 9 UNITS: 100 INJECTION, SOLUTION INTRAVENOUS; SUBCUTANEOUS at 08:24

## 2018-03-17 RX ADMIN — RISPERIDONE 1 MG: 1 TABLET, FILM COATED ORAL at 21:33

## 2018-03-17 RX ADMIN — Medication 10 ML: at 21:29

## 2018-03-17 RX ADMIN — FLUDROCORTISONE ACETATE 0.1 MG: 0.1 TABLET ORAL at 08:21

## 2018-03-17 RX ADMIN — QUETIAPINE FUMARATE 50 MG: 50 TABLET, FILM COATED ORAL at 08:21

## 2018-03-17 RX ADMIN — ACETAMINOPHEN 650 MG: 325 TABLET, FILM COATED ORAL at 21:35

## 2018-03-17 RX ADMIN — ROPINIROLE HYDROCHLORIDE 2 MG: 2 TABLET, FILM COATED ORAL at 21:32

## 2018-03-17 RX ADMIN — METOPROLOL TARTRATE 12.5 MG: 25 TABLET ORAL at 21:31

## 2018-03-17 ASSESSMENT — PAIN SCALES - GENERAL
PAINLEVEL_OUTOF10: 0
PAINLEVEL_OUTOF10: 7
PAINLEVEL_OUTOF10: 6
PAINLEVEL_OUTOF10: 8
PAINLEVEL_OUTOF10: 9
PAINLEVEL_OUTOF10: 8

## 2018-03-17 ASSESSMENT — PAIN DESCRIPTION - LOCATION: LOCATION: BACK

## 2018-03-17 ASSESSMENT — PAIN DESCRIPTION - PAIN TYPE: TYPE: CHRONIC PAIN

## 2018-03-17 NOTE — PROGRESS NOTES
 B-D UF III MINI PEN NEEDLES 31G X 5 MM MISC USE WITH INSULIN 4 TIMES DAILY 100 each 2           Allergies  Allergies   Allergen Reactions    Ativan [Lorazepam]      hallucinations    Codeine      Cough syrup gi upset        Social   Social History   Substance Use Topics    Smoking status: Former Smoker     Packs/day: 2.00     Years: 50.00     Types: Cigarettes     Quit date: 12/14/2015    Smokeless tobacco: Never Used    Alcohol use No      Comment: rarely             Family History   Problem Relation Age of Onset    Diabetes Mother     Other Other      Testicular rhabomyosarcoma           Review of Systems    Unable to answer    Tolerating antibiotics. Physical Exam  /76   Pulse 109   Temp 97.7 °F (36.5 °C) (Oral)   Resp 20   Ht 5' 7\" (1.702 m)   Wt 174 lb 6.1 oz (79.1 kg)   SpO2 90%   BMI 27.31 kg/m²           General Appearance:NAD  Skin: warm and dry, no rash    Neck: neck supple    Pulmonary/Chest: Decreased aeration bilaterally-  Cardiovascular: normal rate, regular rhythm, normal S1 and S2, no murmurs  Abdomen: soft, non-distended   Extremities: no cyanosis, clubbing. Both feet mild erythema   Musculoskeletal:  no joint swelling  MEDLINE SITE OK     Data Review:    Recent Labs      03/15/18   1015  03/16/18   0525  03/17/18   0545   WBC  5.8  5.2  5.1   HGB  12.2*  12.3*  12.5*   HCT  36.5*  36.0*  37.6*   MCV  98.3  98.8  97.9   PLT  179  176  204     Component Results     Component Collected Lab   Specimen Description 02/22/2018  5:35 AM [address] Lab   . NASOPHARYNGEAL SWAB Performed at [facility]: NARAYAN Brandt [address]    Specimen Description 02/22/2018  5:35 AM [facility]. [address] [phone]    Special Requests 02/22/2018  5:35  [facility]   NOT REPORTED    Direct Exam 02/22/2018  5:35 AM [address] for Influenza A + B antigens.              Landen DING

## 2018-03-17 NOTE — PROGRESS NOTES
Pulmonary Progress Note  Pulmonary [facility] Specialists      Patient - Rolo Santo,  Age - [de-identified] y.o.    - 1940      Room Number - 8/-01   [facility] -  [id number]   Acct # - [de-identified]  Date of Admission -  2018  2:48 PM        Consulting Brian Veloz MD  Primary Care Physician - Kellen Gleason, DO     SUBJECTIVE   No resp issues    OBJECTIVE   VITALS    height is 5' 7\" (1.702 m) and weight is 174 lb 6.1 oz (79.1 kg). His oral temperature is 97.7 °F (36.5 °C). His blood pressure is 114/76 and his pulse is 109. His respiration is 20 and oxygen saturation is 90%. Body mass index is 27.31 kg/m². Temperature Range: Temp: 97.7 °F (36.5 °C) Temp  Av.8 °F (36.6 °C)  Min: 97.7 °F (36.5 °C)  Max: 97.9 °F (36.6 °C)  BP Range:  Systolic (83UHF), PNM:958 , Min:114 , OTB:306     Diastolic (24RXV), DFR:90, Min:71, Max:78    Pulse Range: Pulse  Av.7  Min: 97  Max: 109  Respiration Range: Resp  Av  Min: 12  Max: 20  Current Pulse Ox[de-identified]  SpO2: 90 %  24HR Pulse Ox Range:  SpO2  Av.8 %  Min: 88 %  Max: 93 %  Oxygen Amount and Delivery: O2 Flow Rate (L/min): 3 L/min    Wt Readings from Last 3 Encounters:   18 174 lb 6.1 oz (79.1 kg)   17 194 lb 9.6 oz (88.3 kg)   17 180 lb (81.6 kg)       I/O (24 Hours)    Intake/Output Summary (Last 24 hours) at 18 1029  Last data filed at 18 0524   Gross per 24 hour   Intake                0 ml   Output             1225 ml   Net            -1225 ml       EXAM     General Appearance  Awake, alert, oriented, in no acute distress  HEENT - normocephalic, atraumatic. Neck - Supple,  trachea midline   Lungs - coarse  Heart Exam:PMI normal. No lifts, heaves, or thrills. RRR. No murmurs, clicks, gallops, or rubs  Abdomen Exam: Abdomen soft, non-tender.   Extremity Exam: no edema    MEDS      lidocaine  1 patch Transdermal Daily    lidocaine  1 patch Transdermal Nightly    fludrocortisone  0.1 mg Oral Daily    QUEtiapine  50 mg Oral BID    risperiDONE  1 mg Oral BID    metoprolol tartrate  12.5 mg Oral BID    insulin glargine  40 Units Subcutaneous Nightly    insulin lispro  0-18 Units Subcutaneous 4x Daily AC & HS    rOPINIRole  2 mg Oral Nightly    sodium chloride flush  10 mL Intravenous 2 times per day    enoxaparin  40 mg Subcutaneous Daily    finasteride  5 mg Oral Daily    simvastatin  20 mg Oral Nightly      dextrose       OLANZapine **AND** sterile water, hydrALAZINE, ondansetron, sodium chloride flush, acetaminophen, sodium phosphate IVPB **OR** sodium phosphate IVPB, potassium chloride **OR** potassium chloride **OR** potassium chloride, magnesium sulfate, glucose, dextrose, glucagon (rDNA), dextrose    LABS   CBC   Recent Labs      03/17/18   0545   WBC  5.1   HGB  12.5*   HCT  37.6*   MCV  97.9   PLT  204     BMP: Lab Results   Component Value Date     03/17/2018    K 3.9 03/17/2018     03/17/2018    CO2 30 03/17/2018    BUN 20 03/17/2018    LABALBU 3.4 03/07/2018    CREATININE 0.72 03/17/2018    CALCIUM 9.3 03/17/2018    GFRAA >60 03/17/2018    LABGLOM >60 03/17/2018     ABGs:  Lab Results   Component Value Date    PHART 7.429 03/12/2018    PO2ART 52.7 03/12/2018    NLP1FOQ 44.4 03/12/2018    Lab Results   Component Value Date    MODE NOT REPORTED 03/12/2018     Ionized Calcium:  No results found for: IONCA  Magnesium:    Lab Results   Component Value Date    MG 2.5 02/26/2018     Phosphorus:    Lab Results   Component Value Date    PHOS 2.1 02/26/2018        LIVER PROFILE No results for input(s): AST, ALT, LIPASE, BILIDIR, BILITOT, ALKPHOS in the last 72 hours. Invalid input(s): AMYLASE,  ALB  INR No results for input(s): INR in the last 72 hours.   PTT   Lab Results   Component Value Date    APTT 30.3 02/23/2018         RADIOLOGY     (See actual reports for details)    ASSESSMENT/PLAN     Patient Active Problem List   Diagnosis    COPD

## 2018-03-18 LAB
ABSOLUTE EOS #: 0.2 K/UL (ref 0–0.4)
ABSOLUTE IMMATURE GRANULOCYTE: ABNORMAL K/UL (ref 0–0.3)
ABSOLUTE LYMPH #: 1.3 K/UL (ref 1–4.8)
ABSOLUTE MONO #: 0.7 K/UL (ref 0.1–1.3)
ANION GAP SERPL CALCULATED.3IONS-SCNC: 10 MMOL/L (ref 9–17)
BASOPHILS # BLD: 0 % (ref 0–2)
BASOPHILS ABSOLUTE: 0 K/UL (ref 0–0.2)
BUN BLDV-MCNC: 17 MG/DL (ref 8–23)
BUN/CREAT BLD: ABNORMAL (ref 9–20)
CALCIUM SERPL-MCNC: 9 MG/DL (ref 8.6–10.4)
CHLORIDE BLD-SCNC: 99 MMOL/L (ref 98–107)
CO2: 28 MMOL/L (ref 20–31)
CREAT SERPL-MCNC: 0.72 MG/DL (ref 0.7–1.2)
DIFFERENTIAL TYPE: ABNORMAL
EOSINOPHILS RELATIVE PERCENT: 3 % (ref 0–4)
GFR AFRICAN AMERICAN: >60 ML/MIN
GFR NON-AFRICAN AMERICAN: >60 ML/MIN
GFR SERPL CREATININE-BSD FRML MDRD: ABNORMAL ML/MIN/{1.73_M2}
GFR SERPL CREATININE-BSD FRML MDRD: ABNORMAL ML/MIN/{1.73_M2}
GLUCOSE BLD-MCNC: 159 MG/DL (ref 75–110)
GLUCOSE BLD-MCNC: 185 MG/DL (ref 75–110)
GLUCOSE BLD-MCNC: 187 MG/DL (ref 70–99)
GLUCOSE BLD-MCNC: 203 MG/DL (ref 75–110)
GLUCOSE BLD-MCNC: 217 MG/DL (ref 75–110)
HCT VFR BLD CALC: 36.9 % (ref 41–53)
HEMOGLOBIN: 12.4 G/DL (ref 13.5–17.5)
IMMATURE GRANULOCYTES: ABNORMAL %
LYMPHOCYTES # BLD: 18 % (ref 24–44)
MCH RBC QN AUTO: 33.3 PG (ref 26–34)
MCHC RBC AUTO-ENTMCNC: 33.6 G/DL (ref 31–37)
MCV RBC AUTO: 98.9 FL (ref 80–100)
MONOCYTES # BLD: 10 % (ref 1–7)
NRBC AUTOMATED: ABNORMAL PER 100 WBC
PDW BLD-RTO: 13.4 % (ref 11.5–14.9)
PLATELET # BLD: 215 K/UL (ref 150–450)
PLATELET ESTIMATE: ABNORMAL
PMV BLD AUTO: 7.8 FL (ref 6–12)
POTASSIUM SERPL-SCNC: 4.2 MMOL/L (ref 3.7–5.3)
RBC # BLD: 3.73 M/UL (ref 4.5–5.9)
RBC # BLD: ABNORMAL 10*6/UL
SEG NEUTROPHILS: 69 % (ref 36–66)
SEGMENTED NEUTROPHILS ABSOLUTE COUNT: 4.9 K/UL (ref 1.3–9.1)
SODIUM BLD-SCNC: 137 MMOL/L (ref 135–144)
WBC # BLD: 7.1 K/UL (ref 3.5–11)
WBC # BLD: ABNORMAL 10*3/UL

## 2018-03-18 PROCEDURE — 80048 BASIC METABOLIC PNL TOTAL CA: CPT

## 2018-03-18 PROCEDURE — 6370000000 HC RX 637 (ALT 250 FOR IP): Performed by: STUDENT IN AN ORGANIZED HEALTH CARE EDUCATION/TRAINING PROGRAM

## 2018-03-18 PROCEDURE — 99232 SBSQ HOSP IP/OBS MODERATE 35: CPT | Performed by: INTERNAL MEDICINE

## 2018-03-18 PROCEDURE — 82947 ASSAY GLUCOSE BLOOD QUANT: CPT

## 2018-03-18 PROCEDURE — 6360000002 HC RX W HCPCS: Performed by: STUDENT IN AN ORGANIZED HEALTH CARE EDUCATION/TRAINING PROGRAM

## 2018-03-18 PROCEDURE — 6370000000 HC RX 637 (ALT 250 FOR IP): Performed by: INTERNAL MEDICINE

## 2018-03-18 PROCEDURE — 85025 COMPLETE CBC W/AUTO DIFF WBC: CPT

## 2018-03-18 PROCEDURE — 6370000000 HC RX 637 (ALT 250 FOR IP): Performed by: RADIOLOGY

## 2018-03-18 PROCEDURE — 6370000000 HC RX 637 (ALT 250 FOR IP): Performed by: PSYCHIATRY & NEUROLOGY

## 2018-03-18 PROCEDURE — 1200000000 HC SEMI PRIVATE

## 2018-03-18 PROCEDURE — 6370000000 HC RX 637 (ALT 250 FOR IP): Performed by: NURSE PRACTITIONER

## 2018-03-18 PROCEDURE — 36415 COLL VENOUS BLD VENIPUNCTURE: CPT

## 2018-03-18 PROCEDURE — 2580000003 HC RX 258: Performed by: STUDENT IN AN ORGANIZED HEALTH CARE EDUCATION/TRAINING PROGRAM

## 2018-03-18 RX ADMIN — FINASTERIDE 5 MG: 5 TABLET, FILM COATED ORAL at 09:25

## 2018-03-18 RX ADMIN — FLUDROCORTISONE ACETATE 0.1 MG: 0.1 TABLET ORAL at 09:25

## 2018-03-18 RX ADMIN — ACETAMINOPHEN 650 MG: 325 TABLET, FILM COATED ORAL at 16:48

## 2018-03-18 RX ADMIN — INSULIN LISPRO 6 UNITS: 100 INJECTION, SOLUTION INTRAVENOUS; SUBCUTANEOUS at 12:25

## 2018-03-18 RX ADMIN — RISPERIDONE 1 MG: 1 TABLET, FILM COATED ORAL at 09:25

## 2018-03-18 RX ADMIN — ENOXAPARIN SODIUM 40 MG: 40 INJECTION SUBCUTANEOUS at 09:25

## 2018-03-18 RX ADMIN — ROPINIROLE HYDROCHLORIDE 2 MG: 2 TABLET, FILM COATED ORAL at 20:44

## 2018-03-18 RX ADMIN — ACETAMINOPHEN 650 MG: 325 TABLET, FILM COATED ORAL at 09:24

## 2018-03-18 RX ADMIN — ACETAMINOPHEN 650 MG: 325 TABLET, FILM COATED ORAL at 02:09

## 2018-03-18 RX ADMIN — INSULIN LISPRO 3 UNITS: 100 INJECTION, SOLUTION INTRAVENOUS; SUBCUTANEOUS at 09:25

## 2018-03-18 RX ADMIN — Medication 10 ML: at 10:38

## 2018-03-18 RX ADMIN — RISPERIDONE 1 MG: 1 TABLET, FILM COATED ORAL at 20:44

## 2018-03-18 RX ADMIN — QUETIAPINE FUMARATE 50 MG: 50 TABLET, FILM COATED ORAL at 20:44

## 2018-03-18 RX ADMIN — METOPROLOL TARTRATE 12.5 MG: 25 TABLET ORAL at 20:42

## 2018-03-18 RX ADMIN — Medication 10 ML: at 20:43

## 2018-03-18 RX ADMIN — ACETAMINOPHEN 650 MG: 325 TABLET, FILM COATED ORAL at 20:41

## 2018-03-18 RX ADMIN — INSULIN LISPRO 6 UNITS: 100 INJECTION, SOLUTION INTRAVENOUS; SUBCUTANEOUS at 16:47

## 2018-03-18 RX ADMIN — METOPROLOL TARTRATE 12.5 MG: 25 TABLET ORAL at 09:24

## 2018-03-18 RX ADMIN — QUETIAPINE FUMARATE 50 MG: 50 TABLET, FILM COATED ORAL at 10:38

## 2018-03-18 RX ADMIN — Medication 40 UNITS: at 20:48

## 2018-03-18 RX ADMIN — SIMVASTATIN 20 MG: 20 TABLET, FILM COATED ORAL at 20:41

## 2018-03-18 ASSESSMENT — PAIN SCALES - GENERAL
PAINLEVEL_OUTOF10: 7
PAINLEVEL_OUTOF10: 9
PAINLEVEL_OUTOF10: 8
PAINLEVEL_OUTOF10: 7
PAINLEVEL_OUTOF10: 9
PAINLEVEL_OUTOF10: 7
PAINLEVEL_OUTOF10: 9
PAINLEVEL_OUTOF10: 9

## 2018-03-18 NOTE — PROGRESS NOTES
Patient has been calm and cooperative from 07:00 until 13:00. Patient is beginning to get restless and agitated at 13:00. Patient's wife is at bedside and decides to leave so patient can sleep. Writer checks on patient at 13:15. Bedside sitter states patient just fell asleep.

## 2018-03-18 NOTE — PROGRESS NOTES
03/18/18   0653  03/18/18   1111   POCGLU  254*  159*  217*     Glycosylated hemoglobin A1C:   No results for input(s): LABA1C in the last 72 hours. INR: No results for input(s): INR in the last 72 hours. Hepatic functions:   No results for input(s): ALKPHOS, ALT, AST, PROT, BILITOT, BILIDIR, LABALBU in the last 72 hours. Pancreatic functions:No results for input(s): LACTA, AMYLASE in the last 72 hours. S. Lactic Acid: No results for input(s): LACTA in the last 72 hours. Cardiac enzymes:No results for input(s): CKTOTAL, CKMB, CKMBINDEX, TROPONINI in the last 72 hours. BNP:No results for input(s): BNP in the last 72 hours. Lipid profile: No results for input(s): CHOL, TRIG, HDL, LDLCALC in the last 72 hours. Invalid input(s): LDL  Blood Gases: No results found for: PH, PCO2, PO2, HCO3, O2SAT  Thyroid functions: No results found for: TSH     Imaging/Diagonstics:  EKG: Normal sinus rhythm    CXR: No acute cardiopulmonary findings. ASSESSMENT:    Patient Active Problem List   Diagnosis    [facility] disease) [facility]    Diabetes 1.5, managed as type 2 ([facility]    Hyperlipidemia    Hypertension    History of bladder cancer    Tobacco use    Sepsis ([facility]    Diabetes mellitus type 2, insulin dependent (HCC)    Thrombocytopenia (HCC)    Bacteremia    Closed compression fracture of fourth lumbar vertebra (HCC)    Age-related osteoporosis with current pathological fracture with routine healing    Severe malnutrition (HCC)       PLAN:  MSSA septicemia secondary to pneumonia significant improvement IV Rocephin until March 10  COPD  Uncontrolled diabetes with A1c 9  Lantus adjusted  March 12  overnigt confusion ?  Acute psychoissis  Vs metabolic encehalto  Will need placement  March 13  consistant low bp  likey normal bp for him is  80 sys  Will add florinef low dose  awaiting dementia unit placement  March 14  xr back  placmetn to ec  March 15  10% compression fracture lumbar aget inderminate  Doubt will need anykypho  Awaiting dr Estephania Abraham input  placmenet planning  Case discussed with dr Tijerina Arabia agrees with  Placement  Pending ortho input  March 16  othro input noted  Placement planning       3/17     stable mssa sepsis resolved   no fever    Awaiting pre certy         doing well   bs covered with ss hb stable no fever   bp controlled   sepsis reslved   d/c rcg  Pre cert pend   MD WING Epstein[address]    Phone [phone]   Fax: [phone]  Answering Service: [phone]

## 2018-03-18 NOTE — PLAN OF CARE
Problem: Falls - Risk of  Goal: Absence of falls  Outcome: Ongoing  Pt. Free of falls and injuries this shift.     Problem: OXYGENATION/RESPIRATORY FUNCTION  Goal: Patient will maintain patent airway  Outcome: Ongoing  Maintained adequate oxygen saturations. Encouraged C and DB and incentive spirometry. Oxygen therapy as needed. See head to toe assessment.     Problem: Gas Exchange - Impaired:  Goal: Levels of oxygenation will improve  Levels of oxygenation will improve   Outcome: Ongoing  Maintained adequate oxygen saturations. Encouraged C and DB and incentive spirometry. Oxygen therapy as needed. See head to toe assessment.     Problem: Infection, Septic Shock:  Goal: Will show no infection signs and symptoms  Will show no infection signs and symptoms   Outcome: Ongoing  Vital signs are stable. No s/s of infection. We will continue to monitor.      Problem: Pain:  Goal: Control of acute pain  Control of acute pain   Outcome: Ongoing  Adequate pain control achieved this shift. See MAR.     Problem: Skin Integrity:  Goal: Will show no infection signs and symptoms  Will show no infection signs and symptoms   Outcome: Ongoing  Skin integrity improved/maintained this shift.  See head to toe assessment.

## 2018-03-18 NOTE — CARE COORDINATION
[address] Transitions Interview     2018    Patient: Gary Escamilla Patient : 1940   MRN: [id number]  Reason for Admission: There are no discharge diagnoses documented for the most recent discharge. RARS: [facility] Risk Score: 13.5       Pt recommending SNF, LSW following, [facility] if home with vns//JU      Readmission Risk  Patient Active Problem List   Diagnosis    [facility]    Diabetes 1.5, managed as type 2 ([facility]    Hyperlipidemia    Hypertension    History of bladder cancer    Tobacco use    Sepsis ([facility]    Diabetes mellitus type 2, insulin dependent ([facility]    Thrombocytopenia ([facility]    Bacteremia       Inpatient Assessment  Care Transitions Summary    Care Transitions Inpatient Review  Medication Review  Are you able to afford your medications?:  Yes  Housing Review  Who do you live with?:  Partner/Spouse/SO  Are you an active caregiver in your home?:  No  Social Support  Durable Medical Equipment  Patient DME:  Straight cane, Other  Other Patient DME:  glucometer  Patient Home Equipment:  Oxygen, Nebulizer  Functional Review  Ability to seek help/take action for Emergent/Urgent situations i.e. fire, crime, inclement weather or health crisis. :  Independent  Ability handle personal hygiene needs (bathing/dressing/grooming):  Needs Assistance  Hearing and Vision  Care Transitions Interventions         Follow Up  Future Appointments  Date Time Provider Magan Wright   3/19/2018 9:00 AM Eleuterio Pimentel DO [facility]   4/10/2018 9:30 AM Navdeep Landeros DO [facility]   2018 9:30 AM MD FRANCIS Israel 53 Maintenance  There are no preventive care reminders to display for this patient.     Fernando Yousif RN
ADMISSION NOTE       Patient admitted to room  2121. Time of admit:  Edward Martinez from:  er    Reason for admission:  shortness of breath    Where patient has been residing for the last 24 hrs:  Private residence    Has the patient been admitted to any facility in the last 4 weeks, which one:  no    Family at bedside:  yes    Patient is currently in pain denies  Patient has been oriented to room, educated on how to use call light, and to call for assistance prior to getting up. Bed in lowest and locked position. 2 siderails up for safety. Call light within reach. [address]  DVT Prophylaxis and Vaccine Status  Work List  Mandatory for all patients      Patient must be on both Chemical prophylaxis and Mechanical prophylaxis.  If chemical/mechanical prophylaxis is not ordered, the physician must document a reason for not using prophylaxis     Chemical Prophylaxis  Is patient on chemical prophylaxis: Yes  If no chemical prophylaxis Is a order in for No Chemical VTE prophylaxis na  If no was the physician notified not applicable      Mechanical Prophylaxis  Is patient on mechanical prophylaxis, intermittent pneumatic compression device: Yes  If no was the physician notified not applicable        Pneumonia Vaccine  Vaccine indicated:  Up-to-date  If indicated was the vaccine given: not applicable    Influenza Vaccine (applicable from October through March):  Vaccine indicated: Up-to-date  If indicated was the vaccine given: not applicable    Patient Education  Education completed on DVT prophylaxis: yes
CLARE was informed to go ahead and have Kathy Bejarano restart the pre- cert. Gina RN, clinical lead also spoke about looking into places with a dementia unit as they have now found documentation of a diagnosis of Dementia. At this point, SW will need to speak with the wife to see if this is something that she would consider. CLARE also will see what facilities would have the dementia unit that can meet his needs with the daily sundowning. SW following.
ONGOING DISCHARGE PLAN:    LSW following for SNF. LSW notified OV to restart pre-cert yesterday, however discussed with Lupe Thomas, this morning that pt likely needs a facility with a dementia unit. Unsure if Lex Laird has spoke with pt's spouse regarding this. Will continue to follow along with LSW.     Electronically signed by Chelsea Collado RN on 3/13/2018 at 2:03 PM
ONGOING DISCHARGE PLAN:    Plan is for patient to go to [facility] on Discharge. Waiting on Pre Cert    Note from Pulmonary - On rocephin  On dvt prophylaxis  On duoneb   Charge RN  Reported le edema to admitting team  ECF plans underway    Will continue to follow for additional discharge needs.     Electronically signed by Shanice Blair RN on 3/3/2018 at 3:57 PM
ONGOING DISCHARGE PLAN:    Plan remains for patient to be discharged to [facility], still awaiting pre-cert. If cannot go to SNF, then plan is home with [facility]. Asked Magali from [facility] to run IV Rocephin 2gm daily until 3/10 in case pt ends up going home. Previous notes also state that pt will need walker at home as well. Will wait to see if pre-cert will be obtained for SNF.       Electronically signed by Meghan Elizabeth RN on 3/5/2018 at 11:17 AM
ONGOING DISCHARGE PLAN:    Pt deteriorated overnight, intubated early this morning  Per pulmonology,acute hypoxic and hypercapnic resp failure, staph aureus bacteremia, copd/zay  Continues IV solumed 40 mg q 6 hrs, nafcillin q 4 hrs  Poss LUIS on Monday  Will continue to follow for additional discharge needs  Spouse agrees to [facility], may need snf, sana is started.     Electronically signed by Edward Mcrae, RN on 2/24/2018 at 3:22 PM
ONGOING DISCHARGE PLAN:    Pt was extubated yesterday. on 55% VM  SpO2 93%   LUIS completed  1. A LUIS was performed without complications. 2. LVEF 50%  3. No thrombus or valvular vegetation identified  The patient is from home with spouse and agreeable to [facility]. LSW Lee to follow from afar for PT/OT orders and recommendations.    Continues on IV rocephin, solumed 20 mg q 8, aerosal  Will continue to follow for additional discharge needs.     Electronically signed by René Talavera RN on 2/27/2018 at 8:05 AM
ONGOING DISCHARGE PLAN:    Spoke with patient regarding discharge plan and patient confirms that plan is still to discharge to home with [facility]  Per Pulm    ASSESSMENT/PLAN      Acute on chronic respiratory failure with hypoxia extubated 2/26 after 3 days on vent/was on 3 L O2  COPD  Hypercapnia  ANTONY  MSSA bacteremia negative vegetation on LUIS  Thrombocytopenia/chronic  History of hypertension/diabetes     Plan:  O2 /on 3 L at home  Bronchodilators,  prednisone taper  Antibiotic per ID  On Lovenox        Will continue to follow for additional discharge needs.     Electronically signed by Harshad Bowman RN on 2/28/2018 at 2:43 PM
ONGOING DISCHARGE PLAN:    Spoke with patient regarding discharge plan and the patient is agreeable to a SNF with [facility] once he is discharged from the SNF. Writer informed Yesenia Balbuena and communicated that the patient stated to call his wife as she would be making the decision regarding which facility. Per ID note anticipate PICC with IV rocephin until 3/10. Will continue to follow for additional discharge needs.     Electronically signed by Juan Antonio Daniels RN on 3/1/2018 at 10:18 AM
ONGOING DISCHARGE PLAN:    The patient remains vented at this time and is currently having a bedside LUIS. Active orders for IV rocephin and 40mg Q6 hours solu-medrol. Case Management remains following for discharge planning. The patient is from home with spouse and agreeable to [facility]. LSW Lee to follow from afar for PT/OT orders and recommendations. Will continue to follow for additional discharge needs.     Electronically signed by Moses Londono RN on 2/26/2018 at 10:18 AM
ONGOING DISCHARGE PLAN:     LSW following patient for discharge planning for [facility]. Needs new pre-cert to start today  Needs to be off 1;1, acceptable with telesitter  sana is signed     Will continue to follow for additional discharge needs.   Electronically signed by Emerald Chowdhury RN on 3/12/2018 at 9:39 AM
ONGOING DISCHARGE PLAN:     Plan is for patient to be discharged to [address] [place] [address]. Awaiting pre-cert that was started 3/16. Will continue to follow along with LSW.     Electronically signed by Clarice Aguirre RN on 3/18/2018 at 12:02 PM
SW intern spoke with pt's wife about the option of a dementia unit such as [facility] (being as this location is close to her house) for the pt to go to once he is discharged. The pt's wife was confused at this as he had never been diagnosed with dementia in the past. This information was found through a note from Dr. Sonu Chowdhury on 3/11 in his progress note. Before she makes a decision about OV v. GOP, she would like to speak with the pt's PCP to get a second opinion. The pt's wife is aware of the pt's confusion and SW intern explained to her the purpose of a dementia unit if she decides to make that decision. She requested a list of SNFs that have dementia units. SW will continue to follow for needs.
Social Work-[facility] did an onsite. They do not have a bed available in their dementia unit. Discussed with wife other SNF that have dementia units. She would like to discuss with her children before making a decision.  Amara Cordova
Social work: checked with Lisa on call for [employer] and informed her the one on one would be coming off today. She stated that a new prcert would be needed and could not be started until Monday. Faxed a few updates today. They can get into the system also.   Abelino ag
Writer explained peer to peer to the pt spouse. She said she can not take him home.  Her cell phone # is [phone]
signed by: Bhavya Ocampo RN on 2/23/2018 at 11:02 AM

## 2018-03-19 LAB
ABSOLUTE EOS #: 0.1 K/UL (ref 0–0.4)
ABSOLUTE IMMATURE GRANULOCYTE: ABNORMAL K/UL (ref 0–0.3)
ABSOLUTE LYMPH #: 1.1 K/UL (ref 1–4.8)
ABSOLUTE MONO #: 0.6 K/UL (ref 0.1–1.3)
ANION GAP SERPL CALCULATED.3IONS-SCNC: 10 MMOL/L (ref 9–17)
BASOPHILS # BLD: 1 % (ref 0–2)
BASOPHILS ABSOLUTE: 0 K/UL (ref 0–0.2)
BUN BLDV-MCNC: 18 MG/DL (ref 8–23)
BUN/CREAT BLD: ABNORMAL (ref 9–20)
CALCIUM SERPL-MCNC: 9 MG/DL (ref 8.6–10.4)
CHLORIDE BLD-SCNC: 100 MMOL/L (ref 98–107)
CO2: 31 MMOL/L (ref 20–31)
CREAT SERPL-MCNC: 0.7 MG/DL (ref 0.7–1.2)
DIFFERENTIAL TYPE: ABNORMAL
EOSINOPHILS RELATIVE PERCENT: 2 % (ref 0–4)
GFR AFRICAN AMERICAN: >60 ML/MIN
GFR NON-AFRICAN AMERICAN: >60 ML/MIN
GFR SERPL CREATININE-BSD FRML MDRD: ABNORMAL ML/MIN/{1.73_M2}
GFR SERPL CREATININE-BSD FRML MDRD: ABNORMAL ML/MIN/{1.73_M2}
GLUCOSE BLD-MCNC: 143 MG/DL (ref 75–110)
GLUCOSE BLD-MCNC: 156 MG/DL (ref 75–110)
GLUCOSE BLD-MCNC: 171 MG/DL (ref 75–110)
GLUCOSE BLD-MCNC: 182 MG/DL (ref 70–99)
GLUCOSE BLD-MCNC: 211 MG/DL (ref 75–110)
HCT VFR BLD CALC: 35.4 % (ref 41–53)
HEMOGLOBIN: 12.2 G/DL (ref 13.5–17.5)
IMMATURE GRANULOCYTES: ABNORMAL %
LYMPHOCYTES # BLD: 19 % (ref 24–44)
MCH RBC QN AUTO: 33.7 PG (ref 26–34)
MCHC RBC AUTO-ENTMCNC: 34.5 G/DL (ref 31–37)
MCV RBC AUTO: 97.8 FL (ref 80–100)
MONOCYTES # BLD: 11 % (ref 1–7)
NRBC AUTOMATED: ABNORMAL PER 100 WBC
PDW BLD-RTO: 13.5 % (ref 11.5–14.9)
PLATELET # BLD: 217 K/UL (ref 150–450)
PLATELET ESTIMATE: ABNORMAL
PMV BLD AUTO: 7.6 FL (ref 6–12)
POTASSIUM SERPL-SCNC: 4.1 MMOL/L (ref 3.7–5.3)
RBC # BLD: 3.62 M/UL (ref 4.5–5.9)
RBC # BLD: ABNORMAL 10*6/UL
SEG NEUTROPHILS: 67 % (ref 36–66)
SEGMENTED NEUTROPHILS ABSOLUTE COUNT: 3.7 K/UL (ref 1.3–9.1)
SODIUM BLD-SCNC: 141 MMOL/L (ref 135–144)
WBC # BLD: 5.4 K/UL (ref 3.5–11)
WBC # BLD: ABNORMAL 10*3/UL

## 2018-03-19 PROCEDURE — 6370000000 HC RX 637 (ALT 250 FOR IP): Performed by: PSYCHIATRY & NEUROLOGY

## 2018-03-19 PROCEDURE — 1200000000 HC SEMI PRIVATE

## 2018-03-19 PROCEDURE — 6370000000 HC RX 637 (ALT 250 FOR IP): Performed by: INTERNAL MEDICINE

## 2018-03-19 PROCEDURE — 6370000000 HC RX 637 (ALT 250 FOR IP): Performed by: STUDENT IN AN ORGANIZED HEALTH CARE EDUCATION/TRAINING PROGRAM

## 2018-03-19 PROCEDURE — 6370000000 HC RX 637 (ALT 250 FOR IP): Performed by: NURSE PRACTITIONER

## 2018-03-19 PROCEDURE — 6370000000 HC RX 637 (ALT 250 FOR IP): Performed by: RADIOLOGY

## 2018-03-19 PROCEDURE — 85025 COMPLETE CBC W/AUTO DIFF WBC: CPT

## 2018-03-19 PROCEDURE — 82947 ASSAY GLUCOSE BLOOD QUANT: CPT

## 2018-03-19 PROCEDURE — 99232 SBSQ HOSP IP/OBS MODERATE 35: CPT | Performed by: INTERNAL MEDICINE

## 2018-03-19 PROCEDURE — 36415 COLL VENOUS BLD VENIPUNCTURE: CPT

## 2018-03-19 PROCEDURE — 80048 BASIC METABOLIC PNL TOTAL CA: CPT

## 2018-03-19 RX ADMIN — INSULIN LISPRO 3 UNITS: 100 INJECTION, SOLUTION INTRAVENOUS; SUBCUTANEOUS at 20:30

## 2018-03-19 RX ADMIN — METOPROLOL TARTRATE 12.5 MG: 25 TABLET ORAL at 08:05

## 2018-03-19 RX ADMIN — RISPERIDONE 1 MG: 1 TABLET, FILM COATED ORAL at 08:05

## 2018-03-19 RX ADMIN — METOPROLOL TARTRATE 12.5 MG: 25 TABLET ORAL at 20:18

## 2018-03-19 RX ADMIN — SIMVASTATIN 20 MG: 20 TABLET, FILM COATED ORAL at 20:20

## 2018-03-19 RX ADMIN — FLUDROCORTISONE ACETATE 0.1 MG: 0.1 TABLET ORAL at 08:05

## 2018-03-19 RX ADMIN — RISPERIDONE 1 MG: 1 TABLET, FILM COATED ORAL at 20:20

## 2018-03-19 RX ADMIN — QUETIAPINE FUMARATE 50 MG: 50 TABLET, FILM COATED ORAL at 08:06

## 2018-03-19 RX ADMIN — ROPINIROLE HYDROCHLORIDE 2 MG: 2 TABLET, FILM COATED ORAL at 20:20

## 2018-03-19 RX ADMIN — QUETIAPINE FUMARATE 50 MG: 50 TABLET, FILM COATED ORAL at 20:20

## 2018-03-19 RX ADMIN — FINASTERIDE 5 MG: 5 TABLET, FILM COATED ORAL at 08:06

## 2018-03-19 RX ADMIN — Medication 40 UNITS: at 20:24

## 2018-03-19 NOTE — PROGRESS NOTES
[facility].    Date:   3/19/2018  Patient name:  Karma Cleary  Date of admission:  2/21/2018  2:48 PM  MRN:   [id number]  YOB: 1940      HPI       Overnight no fever chills confused     stable    no fever   no cp    Sitter at the bedside     doing well    no fever   no diarrhea     no cp/sob      REVIEW OF SYSTEMS:    · Unable to get ros  · ams  ·   ·         OBJECTIVE:    BP 97/69   Pulse 102   Temp 98.1 °F (36.7 °C) (Oral)   Resp 16   Ht 5' 7\" (1.702 m)   Wt 169 lb 5 oz (76.8 kg)   SpO2 90%   BMI 26.52 kg/m²    Oxygen with a walker  · Lungs: Mild wheeze bilaterally good air entry  · Heart: regular rate and rhythm, S1, S2 normal, no murmur, click, rub or gallop  · Abdomen: soft, non-tender; bowel sounds normal; no masses,  no organomegaly  · Extremities: extremities normal, atraumatic, no cyanosis or edema  · Neurological:  Reflexes normal and symmetric. Sensation grossly normal  · Confused  · With sitter  ·   · Skin - no rash, no lump   · Eye- no icterus no redness  · GI-oral mucosa moist,  · Lymphatic system-no lymphadenopathy no splenomegaly  · Mouth- mucous membrane moist  · Head-normocephalic atraumatic  · Neck- supple no carotid bruit,Thyroid not palpable. Laboratory Testing:  CBC:   Recent Labs      03/19/18   0637   WBC  5.4   HGB  12.2*   PLT  217     BMP:    Recent Labs      03/17/18   0545  03/18/18   0648  03/19/18   0637   NA  143  137  141   K  3.9  4.2  4.1   CL  100  99  100   CO2  30  28  31   BUN  20  17  18   CREATININE  0.72  0.72  0.70   GLUCOSE  218*  187*  182*     S. Calcium:  Recent Labs      03/19/18   0637   CALCIUM  9.0     S. Ionized Calcium:No results for input(s): IONCA in the last 72 hours. S. Magnesium:No results for input(s): MG in the last 72 hours. S. Phosphorus:No results for input(s): PHOS in the last 72 hours.   S. Glucose:  Recent Labs      03/18/18   1615

## 2018-03-19 NOTE — PROGRESS NOTES
[address]    OCCUPATIONAL THERAPY MISSED TREATMENT NOTE   INPATIENT   Date: 3/19/18  Patient Name: Benita Hilario       Room:   MRN: [id number]   Account #: [de-identified]    : 1940  ([de-identified] y.o.)  Gender: male   Diagnosis: pt admit 2-21 with a fall, CT head, neck and pelvis negative, septicemia 2/2 pneumonia vs UTI,sepsis, COPD, CT L elbow to r/o septic arthritis; PMHx of 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, plan for LUIS             REASON FOR MISSED TREATMENT:  Patient unable to participate   -   Pt unable to follow 1 step commands this AM 2* confusion and is unsafe for functional mobility or EOB tasks. pt naked and unwilling to keep clothes on this AM as well.        Nupur Rivera LANDY

## 2018-03-19 NOTE — PROGRESS NOTES
the day, but supposed to use at 2 liters per minute.  Osteoarthritis     Paralysis of vocal cords     Paralyzed left vocal cord, idiopathic    Restless legs syndrome     controlled with Requip    SOB (shortness of breath)     Type II or unspecified type diabetes mellitus without mention of complication, not stated as uncontrolled     controlled on oral agents    Wears glasses     for reading      Past Surgical History:   Procedure Laterality Date    ABDOMINAL AORTIC ANEURYSM REPAIR  06/26/2013    BACK SURGERY      tumor    BLADDER SURGERY      bx    CATARACT REMOVAL Right     CHOLECYSTECTOMY      CYST REMOVAL      buttocks, benign    CYSTOSCOPY W BIOPSY OF BLADDER N/A 4/28/2017    CYSTOSCOPY BLADDER BIOPSY FULGERATION performed by Crystal Ron MD at [address] [place] [address] Left 12/2015    HERNIA REPAIR      Inguinal herniorrhaphy    KIDNEY STONE SURGERY      extraction of kidney stones    LARYNGOSCOPY      NOSE SURGERY      SKIN BIOPSY  2008    back---bcc    SKIN CANCER EXCISION Right 10/18/2016    basal cell rt. cheek    SMALL INTESTINE SURGERY      \"a long time ago, i had 16 inches removed\"          Admission Meds  No current facility-administered medications on file prior to encounter. Current Outpatient Prescriptions on File Prior to Encounter   Medication Sig Dispense Refill    finasteride (PROSCAR) 5 MG tablet TAKE 1 TABLET BY MOUTH DAILY 90 tablet 3    rOPINIRole (REQUIP) 2 MG tablet TAKE ONE TABLET BY MOUTH EVERY EVENING AS DIRECTED 90 tablet 1    lovastatin (MEVACOR) 40 MG tablet TAKE 1 TABLET BY MOUTH NIGHTLY 90 tablet 1    PROAIR  (90 BASE) MCG/ACT inhaler INHALE 2 PUFFS USING INHALER EVERY 4 HOURS AS NEEDED 6 Inhaler 3    fluticasone-salmeterol (ADVAIR) 250-50 MCG/DOSE AEPB Inhale 1 puff into the lungs every 12 hours.  (Patient taking differently: Inhale 1 puff into the lungs every 12 hours as needed ) 60 each 5    B-D UF III MINI PEN NEEDLES 31G X 5 MM MISC USE WITH INSULIN 4 TIMES DAILY 100 each 2           Allergies  Allergies   Allergen Reactions    Ativan [Lorazepam]      hallucinations    Codeine      Cough syrup gi upset        Social   Social History   Substance Use Topics    Smoking status: Former Smoker     Packs/day: 2.00     Years: 50.00     Types: Cigarettes     Quit date: 12/14/2015    Smokeless tobacco: Never Used    Alcohol use No      Comment: rarely             Family History   Problem Relation Age of Onset    Diabetes Mother     Other Other      Testicular rhabomyosarcoma           Review of Systems    Unable to answer    Tolerating antibiotics. Physical Exam  BP 97/69   Pulse 102   Temp 98.1 °F (36.7 °C) (Oral)   Resp 16   Ht 5' 7\" (1.702 m)   Wt 169 lb 5 oz (76.8 kg)   SpO2 90%   BMI 26.52 kg/m²           General Appearance:NAD,confused   Skin: warm and dry, no rash    Neck: neck supple    Pulmonary/Chest: Decreased aeration bilaterally-  Cardiovascular: normal rate, regular rhythm, normal S1 and S2, no murmurs  Abdomen: soft, non-distended   Extremities: no cyanosis, clubbing. Both feet mild erythema   Musculoskeletal:  no joint swelling      Data Review:    Recent Labs      03/17/18   0545  03/18/18   0648  03/19/18   0637   WBC  5.1  7.1  5.4   HGB  12.5*  12.4*  12.2*   HCT  37.6*  36.9*  35.4*   MCV  97.9  98.9  97.8   PLT  204  215  217     Component Results     Component Collected Lab   Specimen Description 02/22/2018  5:35 [facility]   . NASOPHARYNGEAL SWAB Performed at [facility]: NARAYAN Brandt [address]    Specimen Description 02/22/2018  5:35 AM [facility]. [address], [address] [facility] [phone]    Special Requests 02/22/2018  5:35  [address] [facility]   NOT REPORTED    Direct Exam 02/22/2018  5:35 AM [address] for Influenza A + B antigens.              Doppler LE ,no DVT

## 2018-03-19 NOTE — PLAN OF CARE
Problem: Nutrition  Goal: Optimal nutrition therapy  Outcome: Ongoing  Nutrition Problem: Inadequate oral intake  Intervention: Food and/or Nutrient Delivery: Continue current diet, Continue current ONS  Nutritional Goals: adequate nutriton provision

## 2018-03-20 VITALS
DIASTOLIC BLOOD PRESSURE: 58 MMHG | WEIGHT: 172.4 LBS | RESPIRATION RATE: 18 BRPM | HEART RATE: 62 BPM | BODY MASS INDEX: 27.06 KG/M2 | OXYGEN SATURATION: 94 % | SYSTOLIC BLOOD PRESSURE: 115 MMHG | TEMPERATURE: 98.6 F | HEIGHT: 67 IN

## 2018-03-20 LAB
ABSOLUTE EOS #: 0.1 K/UL (ref 0–0.4)
ABSOLUTE IMMATURE GRANULOCYTE: ABNORMAL K/UL (ref 0–0.3)
ABSOLUTE LYMPH #: 0.9 K/UL (ref 1–4.8)
ABSOLUTE MONO #: 0.7 K/UL (ref 0.1–1.3)
ANION GAP SERPL CALCULATED.3IONS-SCNC: 10 MMOL/L (ref 9–17)
BASOPHILS # BLD: 0 % (ref 0–2)
BASOPHILS ABSOLUTE: 0 K/UL (ref 0–0.2)
BUN BLDV-MCNC: 26 MG/DL (ref 8–23)
BUN/CREAT BLD: ABNORMAL (ref 9–20)
CALCIUM SERPL-MCNC: 9.1 MG/DL (ref 8.6–10.4)
CHLORIDE BLD-SCNC: 101 MMOL/L (ref 98–107)
CO2: 32 MMOL/L (ref 20–31)
CREAT SERPL-MCNC: 0.86 MG/DL (ref 0.7–1.2)
DIFFERENTIAL TYPE: ABNORMAL
EOSINOPHILS RELATIVE PERCENT: 1 % (ref 0–4)
GFR AFRICAN AMERICAN: >60 ML/MIN
GFR NON-AFRICAN AMERICAN: >60 ML/MIN
GFR SERPL CREATININE-BSD FRML MDRD: ABNORMAL ML/MIN/{1.73_M2}
GFR SERPL CREATININE-BSD FRML MDRD: ABNORMAL ML/MIN/{1.73_M2}
GLUCOSE BLD-MCNC: 157 MG/DL (ref 70–99)
GLUCOSE BLD-MCNC: 160 MG/DL (ref 75–110)
HCT VFR BLD CALC: 38.3 % (ref 41–53)
HEMOGLOBIN: 13 G/DL (ref 13.5–17.5)
IMMATURE GRANULOCYTES: ABNORMAL %
LYMPHOCYTES # BLD: 16 % (ref 24–44)
MCH RBC QN AUTO: 33.4 PG (ref 26–34)
MCHC RBC AUTO-ENTMCNC: 33.9 G/DL (ref 31–37)
MCV RBC AUTO: 98.4 FL (ref 80–100)
MONOCYTES # BLD: 12 % (ref 1–7)
NRBC AUTOMATED: ABNORMAL PER 100 WBC
PDW BLD-RTO: 13.4 % (ref 11.5–14.9)
PLATELET # BLD: 243 K/UL (ref 150–450)
PLATELET ESTIMATE: ABNORMAL
PMV BLD AUTO: 7.8 FL (ref 6–12)
POTASSIUM SERPL-SCNC: 4.6 MMOL/L (ref 3.7–5.3)
RBC # BLD: 3.89 M/UL (ref 4.5–5.9)
RBC # BLD: ABNORMAL 10*6/UL
SEG NEUTROPHILS: 71 % (ref 36–66)
SEGMENTED NEUTROPHILS ABSOLUTE COUNT: 4.1 K/UL (ref 1.3–9.1)
SODIUM BLD-SCNC: 143 MMOL/L (ref 135–144)
WBC # BLD: 5.8 K/UL (ref 3.5–11)
WBC # BLD: ABNORMAL 10*3/UL

## 2018-03-20 PROCEDURE — 99232 SBSQ HOSP IP/OBS MODERATE 35: CPT | Performed by: INTERNAL MEDICINE

## 2018-03-20 PROCEDURE — 6370000000 HC RX 637 (ALT 250 FOR IP): Performed by: INTERNAL MEDICINE

## 2018-03-20 PROCEDURE — 6370000000 HC RX 637 (ALT 250 FOR IP): Performed by: PSYCHIATRY & NEUROLOGY

## 2018-03-20 PROCEDURE — 99239 HOSP IP/OBS DSCHRG MGMT >30: CPT | Performed by: INTERNAL MEDICINE

## 2018-03-20 PROCEDURE — 85025 COMPLETE CBC W/AUTO DIFF WBC: CPT

## 2018-03-20 PROCEDURE — 6370000000 HC RX 637 (ALT 250 FOR IP): Performed by: STUDENT IN AN ORGANIZED HEALTH CARE EDUCATION/TRAINING PROGRAM

## 2018-03-20 PROCEDURE — 80048 BASIC METABOLIC PNL TOTAL CA: CPT

## 2018-03-20 PROCEDURE — 6360000002 HC RX W HCPCS: Performed by: STUDENT IN AN ORGANIZED HEALTH CARE EDUCATION/TRAINING PROGRAM

## 2018-03-20 PROCEDURE — 82947 ASSAY GLUCOSE BLOOD QUANT: CPT

## 2018-03-20 PROCEDURE — 6370000000 HC RX 637 (ALT 250 FOR IP): Performed by: RADIOLOGY

## 2018-03-20 PROCEDURE — 36415 COLL VENOUS BLD VENIPUNCTURE: CPT

## 2018-03-20 RX ADMIN — FINASTERIDE 5 MG: 5 TABLET, FILM COATED ORAL at 07:48

## 2018-03-20 RX ADMIN — METOPROLOL TARTRATE 12.5 MG: 25 TABLET ORAL at 07:48

## 2018-03-20 RX ADMIN — QUETIAPINE FUMARATE 50 MG: 50 TABLET, FILM COATED ORAL at 07:48

## 2018-03-20 RX ADMIN — ENOXAPARIN SODIUM 40 MG: 40 INJECTION SUBCUTANEOUS at 07:48

## 2018-03-20 RX ADMIN — RISPERIDONE 1 MG: 1 TABLET, FILM COATED ORAL at 07:48

## 2018-03-20 RX ADMIN — ACETAMINOPHEN 650 MG: 325 TABLET, FILM COATED ORAL at 07:48

## 2018-03-20 RX ADMIN — FLUDROCORTISONE ACETATE 0.1 MG: 0.1 TABLET ORAL at 07:49

## 2018-03-20 ASSESSMENT — PAIN DESCRIPTION - LOCATION: LOCATION: BACK

## 2018-03-20 ASSESSMENT — PAIN SCALES - GENERAL
PAINLEVEL_OUTOF10: 7
PAINLEVEL_OUTOF10: 1

## 2018-03-20 ASSESSMENT — PAIN DESCRIPTION - ORIENTATION: ORIENTATION: RIGHT;LEFT

## 2018-03-20 ASSESSMENT — PAIN DESCRIPTION - PAIN TYPE: TYPE: CHRONIC PAIN

## 2018-03-20 NOTE — PROGRESS NOTES
Report called to [address] [place] [address] Dr. Clive Beltran, nurse at [phone] pm today

## 2018-03-20 NOTE — PROGRESS NOTES
Osteoarthritis     Paralysis of vocal cords     Paralyzed left vocal cord, idiopathic    Restless legs syndrome     controlled with Requip    SOB (shortness of breath)     Type II or unspecified type diabetes mellitus without mention of complication, not stated as uncontrolled     controlled on oral agents    Wears glasses     for reading      Past Surgical History:   Procedure Laterality Date    ABDOMINAL AORTIC ANEURYSM REPAIR  06/26/2013    BACK SURGERY      tumor    BLADDER SURGERY      bx    CATARACT REMOVAL Right     CHOLECYSTECTOMY      CYST REMOVAL      buttocks, benign    CYSTOSCOPY W BIOPSY OF BLADDER N/A 4/28/2017    CYSTOSCOPY BLADDER BIOPSY FULGERATION performed by Park Mathew MD at [address] [place] [address] Left 12/2015    HERNIA REPAIR      Inguinal herniorrhaphy    KIDNEY STONE SURGERY      extraction of kidney stones    LARYNGOSCOPY      NOSE SURGERY      SKIN BIOPSY  2008    back---bcc    SKIN CANCER EXCISION Right 10/18/2016    basal cell rt. cheek    SMALL INTESTINE SURGERY      \"a long time ago, i had 16 inches removed\"          Admission Meds  No current facility-administered medications on file prior to encounter. Current Outpatient Prescriptions on File Prior to Encounter   Medication Sig Dispense Refill    finasteride (PROSCAR) 5 MG tablet TAKE 1 TABLET BY MOUTH DAILY 90 tablet 3    rOPINIRole (REQUIP) 2 MG tablet TAKE ONE TABLET BY MOUTH EVERY EVENING AS DIRECTED 90 tablet 1    lovastatin (MEVACOR) 40 MG tablet TAKE 1 TABLET BY MOUTH NIGHTLY 90 tablet 1    PROAIR  (90 BASE) MCG/ACT inhaler INHALE 2 PUFFS USING INHALER EVERY 4 HOURS AS NEEDED 6 Inhaler 3    fluticasone-salmeterol (ADVAIR) 250-50 MCG/DOSE AEPB Inhale 1 puff into the lungs every 12 hours.  (Patient taking differently: Inhale 1 puff into the lungs every 12 hours as needed ) 60 each 5    B-D UF III MINI PEN NEEDLES 31G X 5 MM MISC USE WITH INSULIN

## 2018-03-20 NOTE — PROGRESS NOTES
Still out of touch with reality. Personal hygiene improving  but continues to be very bizzare, still confused and disoriented,no insight into his needs. BP (!) 115/58   Pulse 62 Comment: radial pulse  Temp 98.6 °F (37 °C) (Oral)   Resp 18   Ht 5' 7\" (1.702 m)   Wt 172 lb 6.4 oz (78.2 kg)   SpO2 94%   BMI 27.00 kg/m²      Affect-Superficial  Mood -  Agitated and labile  Thought process -  Disorganized showing looseness of association and tangentiality. Reality testing-Impaired  Cognition -  Impaired  Memory- Recent-Impaired                Remote-Impaired  Orientation-Disoriented                                              Insight-Poor  Judgement-Poor                                                Attempted to challenge his delusions and develop insight. Reality orientation attempted  Medications reviewed. Side effects of medications reviewed and medication education provided  No side effects including akathisia,tremers,dystonia or orthostasis reported or observed. Plan: Stabilization with antipsychotic and mood stabilization medications,supportive therapy and develop  Coping skills,discharge to ECF. Luh Rbuin

## 2018-03-20 NOTE — PROGRESS NOTES
He is getting agitated at times,more redirectable but still confused,disoriented and out of contact with reality. Rambling incoherently without any direction or purpose . BP (!) 115/58   Pulse 62 Comment: radial pulse  Temp 98.6 °F (37 °C) (Oral)   Resp 18   Ht 5' 7\" (1.702 m)   Wt 172 lb 6.4 oz (78.2 kg)   SpO2 94%   BMI 27.00 kg/m²     Affect-Superficial  Mood -  Agitated and labile  Thought process -  Disorganized showing looseness of association and tangentiality. Reality testing-Impaired  Cognition -  Impaired  Memory- Recent-Impaired                Remote-Impaired  Orientation-Disoriented                                              Insight-Poor  Judgement-Poor     Encouraged to participate and interact with peers. Attempted to develop insight. Medications reviewed. Side effects of medications reviewed and medication education provided. No side effects including akathisia,tremers,dystonia or orthostasis reported or observed. Plan: Stabilization with antipsychotic   medications,supportive therapy and develop coping skills,discharge to community.

## 2018-03-20 NOTE — PROGRESS NOTES
Pt discharged to [facility] via [facility] service  Pt. Confused, off and on  Pt. Had no c/o of SOB or pain, at this time  O 2  Cont.  On per nasal cannula @ 2L/min NC

## 2018-03-21 ENCOUNTER — HOSPITAL ENCOUNTER (OUTPATIENT)
Age: 78
Setting detail: SPECIMEN
Discharge: HOME | End: 2018-03-21
Payer: MEDICARE

## 2018-03-21 ENCOUNTER — HOSPITAL ENCOUNTER (EMERGENCY)
Age: 78
Discharge: HOME | End: 2018-03-21
Attending: EMERGENCY MEDICINE
Payer: MEDICARE

## 2018-03-21 VITALS
HEIGHT: 67 IN | BODY MASS INDEX: 27 KG/M2 | RESPIRATION RATE: 27 BRPM | SYSTOLIC BLOOD PRESSURE: 143 MMHG | DIASTOLIC BLOOD PRESSURE: 86 MMHG | TEMPERATURE: 98.7 F | WEIGHT: 172 LBS | OXYGEN SATURATION: 93 % | HEART RATE: 111 BPM

## 2018-03-21 DIAGNOSIS — F05 SUNDOWNING: Primary | ICD-10-CM

## 2018-03-21 LAB
-: ABNORMAL
AMORPHOUS: ABNORMAL
ANION GAP SERPL CALCULATED.3IONS-SCNC: 15 MMOL/L (ref 9–17)
BACTERIA: ABNORMAL
BILIRUBIN URINE: NEGATIVE
BUN BLDV-MCNC: 28 MG/DL (ref 8–23)
BUN/CREAT BLD: ABNORMAL (ref 9–20)
CALCIUM SERPL-MCNC: 9.1 MG/DL (ref 8.6–10.4)
CASTS UA: ABNORMAL /LPF
CHLORIDE BLD-SCNC: 98 MMOL/L (ref 98–107)
CO2: 25 MMOL/L (ref 20–31)
COLOR: YELLOW
COMMENT UA: ABNORMAL
CREAT SERPL-MCNC: 0.88 MG/DL (ref 0.7–1.2)
CRYSTALS, UA: ABNORMAL /HPF
EPITHELIAL CELLS UA: ABNORMAL /HPF
GFR AFRICAN AMERICAN: >60 ML/MIN
GFR NON-AFRICAN AMERICAN: >60 ML/MIN
GFR SERPL CREATININE-BSD FRML MDRD: ABNORMAL ML/MIN/{1.73_M2}
GFR SERPL CREATININE-BSD FRML MDRD: ABNORMAL ML/MIN/{1.73_M2}
GLUCOSE BLD-MCNC: 216 MG/DL (ref 70–99)
GLUCOSE URINE: ABNORMAL
HCT VFR BLD CALC: 39.5 % (ref 40.7–50.3)
HEMOGLOBIN: 12.6 G/DL (ref 13–17)
KETONES, URINE: NEGATIVE
LEUKOCYTE ESTERASE, URINE: NEGATIVE
MCH RBC QN AUTO: 32.1 PG (ref 25.2–33.5)
MCHC RBC AUTO-ENTMCNC: 31.9 G/DL (ref 28.4–34.8)
MCV RBC AUTO: 100.5 FL (ref 82.6–102.9)
MUCUS: ABNORMAL
NITRITE, URINE: NEGATIVE
NRBC AUTOMATED: 0 PER 100 WBC
OTHER OBSERVATIONS UA: ABNORMAL
PDW BLD-RTO: 13.1 % (ref 11.8–14.4)
PH UA: 5 (ref 5–8)
PLATELET # BLD: 261 K/UL (ref 138–453)
PMV BLD AUTO: 10.2 FL (ref 8.1–13.5)
POTASSIUM SERPL-SCNC: 3.9 MMOL/L (ref 3.7–5.3)
PROTEIN UA: ABNORMAL
RBC # BLD: 3.93 M/UL (ref 4.21–5.77)
RBC UA: ABNORMAL /HPF
RENAL EPITHELIAL, UA: ABNORMAL /HPF
SODIUM BLD-SCNC: 138 MMOL/L (ref 135–144)
SPECIFIC GRAVITY UA: 1.03 (ref 1–1.03)
TRICHOMONAS: ABNORMAL
TURBIDITY: ABNORMAL
URINE HGB: ABNORMAL
UROBILINOGEN, URINE: NORMAL
WBC # BLD: 10.1 K/UL (ref 3.5–11.3)
WBC UA: ABNORMAL /HPF
YEAST: ABNORMAL

## 2018-03-21 PROCEDURE — 87086 URINE CULTURE/COLONY COUNT: CPT

## 2018-03-21 PROCEDURE — 80048 BASIC METABOLIC PNL TOTAL CA: CPT

## 2018-03-21 PROCEDURE — 36415 COLL VENOUS BLD VENIPUNCTURE: CPT

## 2018-03-21 PROCEDURE — 99285 EMERGENCY DEPT VISIT HI MDM: CPT

## 2018-03-21 PROCEDURE — P9603 ONE-WAY ALLOW PRORATED MILES: HCPCS

## 2018-03-21 PROCEDURE — 85027 COMPLETE CBC AUTOMATED: CPT

## 2018-03-21 PROCEDURE — 81001 URINALYSIS AUTO W/SCOPE: CPT

## 2018-03-21 ASSESSMENT — ENCOUNTER SYMPTOMS
EYE PAIN: 0
ABDOMINAL PAIN: 0
DIARRHEA: 0
EYE REDNESS: 0
SHORTNESS OF BREATH: 0
RHINORRHEA: 0
VOMITING: 0
COUGH: 0
EYE DISCHARGE: 0
BACK PAIN: 0

## 2018-03-21 NOTE — ED NOTES
Pt hungry. Per MD Brad Dahl, pt can have a tray. PT was ordered a tray with mechanical soft diet with honey consistency liquids.       Fanny Pool, RN  03/21/18 6411

## 2018-03-21 NOTE — ED PROVIDER NOTES
oxygen    Chronic hypoxemic respiratory failure (HCC)     COPD (chronic obstructive pulmonary disease) (HCC)     stage II    Depression     Diverticulitis     GERD (gastroesophageal reflux disease)     Hard of hearing     both ears    History of kidney stones     passed on own years ago    History of nasal obstruction     History of smoking     Hoarseness     multifactorial    Hyperlipidemia     Hypertension     Hypertrophy of nasal turbinates     Irritable bowel     MDRO (multiple drug resistant organisms) resistance 12/2015    MRSA?  Mild obstructive sleep apnea     On supplemental oxygen therapy     2 liters per minute at night, patient does not use during the day, but supposed to use at 2 liters per minute.  Osteoarthritis     Paralysis of vocal cords     Paralyzed left vocal cord, idiopathic    Restless legs syndrome     controlled with Requip    SOB (shortness of breath)     Type II or unspecified type diabetes mellitus without mention of complication, not stated as uncontrolled     controlled on oral agents    Wears glasses     for reading       SURGICAL HISTORY       Past Surgical History:   Procedure Laterality Date    ABDOMINAL AORTIC ANEURYSM REPAIR  06/26/2013    BACK SURGERY      tumor    BLADDER SURGERY      bx    CATARACT REMOVAL Right     CHOLECYSTECTOMY      CYST REMOVAL      buttocks, benign    CYSTOSCOPY W BIOPSY OF BLADDER N/A 4/28/2017    CYSTOSCOPY BLADDER BIOPSY FULGERATION performed by Michelle Dumont MD at [address] [place] [address] Left 12/2015    HERNIA REPAIR      Inguinal herniorrhaphy    KIDNEY STONE SURGERY      extraction of kidney stones    LARYNGOSCOPY      NOSE SURGERY      SKIN BIOPSY  2008    back---bcc    SKIN CANCER EXCISION Right 10/18/2016    basal cell rt.  cheek    SMALL INTESTINE SURGERY      \"a long time ago, i had 16 inches removed\"       CURRENT MEDICATIONS       Previous Medications Cigarettes     Quit date: 12/14/2015    Smokeless tobacco: Never Used    Alcohol use No      Comment: rarely    Drug use: No    Sexual activity: Not on file     Other Topics Concern    Not on file     Social History Narrative    No narrative on file       PHYSICAL EXAM     INITIAL VITALS:  height is 5' 7\" (1.702 m) and weight is 172 lb (78 kg). His oral temperature is 98.7 °F (37.1 °C). His blood pressure is 105/67 and his pulse is 108. His respiration is 25 and oxygen saturation is 95%. Physical Exam   Constitutional: He appears well-developed and well-nourished. HENT:   Head: Normocephalic and atraumatic. Mouth/Throat: Oropharynx is clear and moist.   Eyes: Conjunctivae and EOM are normal. Pupils are equal, round, and reactive to light. Right eye exhibits no discharge. Left eye exhibits no discharge. Neck: Normal range of motion. Neck supple. Cardiovascular: Regular rhythm, normal heart sounds and intact distal pulses. Borderline heart rate   Pulmonary/Chest: Effort normal and breath sounds normal. No respiratory distress. He has no wheezes. Abdominal: Soft. Bowel sounds are normal. He exhibits no distension. There is no tenderness. Genitourinary:   Genitourinary Comments: Castaneda present   Musculoskeletal: Normal range of motion. Neurological: He is alert. Person and place   Skin: Skin is warm and dry. Nursing note and vitals reviewed. DIFFERENTIAL DIAGNOSIS/ MDM:     Patient here with appears to be at his baseline. Patient did have some issues with sundowners while in the hospital and overall I do think it was going on. He has some recent stable labs yesterday. Patient does have a quantitative indwelling Castaneda and will check his urine at this time. Patient is cooperative and hungry and otherwise appears to be accurate his baseline    1330: Patient continues to stable condition. Patient did eat his lunch.   Wife is here at this time we did talk about the recent chance priors. She does also [facility] Overall history and physical most consistent with this accommodation of his dementia. At this time she's been very pleasant and interactive and otherwise nontoxic appearing. He has no other specific complaints. Patient stable for discharge    DIAGNOSTIC RESULTS       LABS:  Labs Reviewed   URINALYSIS - Abnormal; Notable for the following:        Result Value    Turbidity UA CLOUDY (*)     Glucose, Ur 3+ (*)     Urine Hgb LARGE (*)     Protein, UA 1+ (*)     All other components within normal limits   MICROSCOPIC URINALYSIS - Abnormal; Notable for the following:     Bacteria, UA FEW (*)     All other components within normal limits   URINE CULTURE           EMERGENCY DEPARTMENT COURSE:   Vitals:    Vitals:    03/21/18 1212 03/21/18 1230 03/21/18 1311   BP: 128/76 105/61 105/67   Pulse: 105 109 108   Resp: 29  25   Temp: 98.6 °F (37 °C)  98.7 °F (37.1 °C)   TempSrc: Oral  Oral   SpO2: 93%  95%   Weight: 172 lb (78 kg)     Height: 5' 7\" (1.702 m)       -------------------------  BP: 105/67, Temp: 98.7 °F (37.1 °C), Pulse: 108, Resp: 25      FINAL IMPRESSION      1.  Sundowning          DISPOSITION/PLAN    DISPOSITION Decision To Discharge 03/21/2018 01:32:57 PM      PATIENT REFERRED TO:  Ronen Garcia, [address]  [phone]    Call in 1 day        DISCHARGE MEDICATIONS:  New Prescriptions    No medications on file       (Please note that portions of this note were completed with a voice recognition program.  Efforts were made to edit the dictations but occasionally words are mis-transcribed.)    Zuhair Mccarthy MD, SARA, [address] [facility]  Attending Emergency Physician                     Zuhair Mccarthy MD  03/21/18 1464

## 2018-03-22 ENCOUNTER — TELEPHONE (OUTPATIENT)
Dept: FAMILY MEDICINE CLINIC | Age: 78
End: 2018-03-22

## 2018-03-22 ENCOUNTER — CARE COORDINATION (OUTPATIENT)
Dept: CASE MANAGEMENT | Age: 78
End: 2018-03-22

## 2018-03-22 LAB
CULTURE: NO GROWTH
CULTURE: NORMAL
Lab: NORMAL
SPECIMEN DESCRIPTION: NORMAL
SPECIMEN DESCRIPTION: NORMAL
STATUS: NORMAL

## 2018-03-23 ENCOUNTER — TELEPHONE (OUTPATIENT)
Dept: FAMILY MEDICINE CLINIC | Age: 78
End: 2018-03-23

## 2018-04-09 ENCOUNTER — HOSPITAL ENCOUNTER (OUTPATIENT)
Age: 78
Setting detail: SPECIMEN
Discharge: HOME | End: 2018-04-09
Payer: MEDICARE

## 2018-04-09 LAB
ALBUMIN SERPL-MCNC: 2.9 G/DL (ref 3.5–5.2)
ALBUMIN/GLOBULIN RATIO: 0.8 (ref 1–2.5)
ALP BLD-CCNC: 178 U/L (ref 40–129)
ALT SERPL-CCNC: 7 U/L (ref 5–41)
ANION GAP SERPL CALCULATED.3IONS-SCNC: 13 MMOL/L (ref 9–17)
AST SERPL-CCNC: 30 U/L
BILIRUB SERPL-MCNC: 0.66 MG/DL (ref 0.3–1.2)
BUN BLDV-MCNC: 20 MG/DL (ref 8–23)
BUN/CREAT BLD: ABNORMAL (ref 9–20)
CALCIUM SERPL-MCNC: 9 MG/DL (ref 8.6–10.4)
CHLORIDE BLD-SCNC: 101 MMOL/L (ref 98–107)
CO2: 30 MMOL/L (ref 20–31)
CREAT SERPL-MCNC: 0.52 MG/DL (ref 0.7–1.2)
GFR AFRICAN AMERICAN: >60 ML/MIN
GFR NON-AFRICAN AMERICAN: >60 ML/MIN
GFR SERPL CREATININE-BSD FRML MDRD: ABNORMAL ML/MIN/{1.73_M2}
GFR SERPL CREATININE-BSD FRML MDRD: ABNORMAL ML/MIN/{1.73_M2}
GLUCOSE BLD-MCNC: 136 MG/DL (ref 70–99)
HCT VFR BLD CALC: 33.8 % (ref 40.7–50.3)
HEMOGLOBIN: 10.8 G/DL (ref 13–17)
MCH RBC QN AUTO: 31.2 PG (ref 25.2–33.5)
MCHC RBC AUTO-ENTMCNC: 32 G/DL (ref 28.4–34.8)
MCV RBC AUTO: 97.7 FL (ref 82.6–102.9)
NRBC AUTOMATED: 0 PER 100 WBC
PDW BLD-RTO: 14.3 % (ref 11.8–14.4)
PLATELET # BLD: 164 K/UL (ref 138–453)
PMV BLD AUTO: 10.8 FL (ref 8.1–13.5)
POTASSIUM SERPL-SCNC: 3.6 MMOL/L (ref 3.7–5.3)
RBC # BLD: 3.46 M/UL (ref 4.21–5.77)
SODIUM BLD-SCNC: 144 MMOL/L (ref 135–144)
TOTAL PROTEIN: 6.7 G/DL (ref 6.4–8.3)
WBC # BLD: 8.9 K/UL (ref 3.5–11.3)

## 2018-04-09 PROCEDURE — 85027 COMPLETE CBC AUTOMATED: CPT

## 2018-04-09 PROCEDURE — 80053 COMPREHEN METABOLIC PANEL: CPT

## 2018-04-16 ENCOUNTER — HOSPITAL ENCOUNTER (OUTPATIENT)
Age: 78
Setting detail: SPECIMEN
Discharge: HOME | End: 2018-04-16
Payer: MEDICARE

## 2018-04-16 LAB
ANION GAP SERPL CALCULATED.3IONS-SCNC: 10 MMOL/L (ref 9–17)
BUN BLDV-MCNC: 13 MG/DL (ref 8–23)
BUN/CREAT BLD: ABNORMAL (ref 9–20)
CALCIUM SERPL-MCNC: 7.9 MG/DL (ref 8.6–10.4)
CHLORIDE BLD-SCNC: 96 MMOL/L (ref 98–107)
CO2: 32 MMOL/L (ref 20–31)
CREAT SERPL-MCNC: 0.47 MG/DL (ref 0.7–1.2)
GFR AFRICAN AMERICAN: >60 ML/MIN
GFR NON-AFRICAN AMERICAN: >60 ML/MIN
GFR SERPL CREATININE-BSD FRML MDRD: ABNORMAL ML/MIN/{1.73_M2}
GFR SERPL CREATININE-BSD FRML MDRD: ABNORMAL ML/MIN/{1.73_M2}
GLUCOSE BLD-MCNC: 141 MG/DL (ref 70–99)
HCT VFR BLD CALC: 33.2 % (ref 40.7–50.3)
HEMOGLOBIN: 10.7 G/DL (ref 13–17)
MCH RBC QN AUTO: 31.4 PG (ref 25.2–33.5)
MCHC RBC AUTO-ENTMCNC: 32.2 G/DL (ref 28.4–34.8)
MCV RBC AUTO: 97.4 FL (ref 82.6–102.9)
NRBC AUTOMATED: 0 PER 100 WBC
PDW BLD-RTO: 14.1 % (ref 11.8–14.4)
PLATELET # BLD: 155 K/UL (ref 138–453)
PMV BLD AUTO: 10.9 FL (ref 8.1–13.5)
POTASSIUM SERPL-SCNC: 2.6 MMOL/L (ref 3.7–5.3)
RBC # BLD: 3.41 M/UL (ref 4.21–5.77)
SODIUM BLD-SCNC: 138 MMOL/L (ref 135–144)
WBC # BLD: 5.9 K/UL (ref 3.5–11.3)

## 2018-04-16 PROCEDURE — 80048 BASIC METABOLIC PNL TOTAL CA: CPT

## 2018-04-16 PROCEDURE — P9603 ONE-WAY ALLOW PRORATED MILES: HCPCS

## 2018-04-16 PROCEDURE — 80076 HEPATIC FUNCTION PANEL: CPT

## 2018-04-16 PROCEDURE — 85027 COMPLETE CBC AUTOMATED: CPT

## 2018-04-16 PROCEDURE — 36415 COLL VENOUS BLD VENIPUNCTURE: CPT

## 2018-04-17 ENCOUNTER — HOSPITAL ENCOUNTER (OUTPATIENT)
Age: 78
Setting detail: SPECIMEN
Discharge: HOME | End: 2018-04-17
Payer: MEDICARE

## 2018-04-17 LAB
MAGNESIUM: 1.8 MG/DL (ref 1.6–2.6)
POTASSIUM SERPL-SCNC: 3 MMOL/L (ref 3.7–5.3)
PREALBUMIN: 17.3 MG/DL (ref 20–40)

## 2018-04-17 PROCEDURE — 84134 ASSAY OF PREALBUMIN: CPT

## 2018-04-17 PROCEDURE — 84132 ASSAY OF SERUM POTASSIUM: CPT

## 2018-04-17 PROCEDURE — 83735 ASSAY OF MAGNESIUM: CPT

## 2018-04-20 LAB
ALBUMIN SERPL-MCNC: 2.8 G/DL (ref 3.5–5.2)
ALBUMIN/GLOBULIN RATIO: 0.8 (ref 1–2.5)
ALP BLD-CCNC: 175 U/L (ref 40–129)
ALT SERPL-CCNC: 8 U/L (ref 5–41)
AST SERPL-CCNC: 30 U/L
BILIRUB SERPL-MCNC: 0.52 MG/DL (ref 0.3–1.2)
BILIRUBIN DIRECT: 0.14 MG/DL
BILIRUBIN, INDIRECT: 0.38 MG/DL (ref 0–1)
GLOBULIN: ABNORMAL G/DL (ref 1.5–3.8)
TOTAL PROTEIN: 6.3 G/DL (ref 6.4–8.3)

## 2018-04-23 ENCOUNTER — HOSPITAL ENCOUNTER (OUTPATIENT)
Age: 78
Setting detail: SPECIMEN
Discharge: HOME | End: 2018-04-23
Payer: MEDICARE

## 2018-04-23 LAB
ANION GAP SERPL CALCULATED.3IONS-SCNC: 11 MMOL/L (ref 9–17)
BUN BLDV-MCNC: 11 MG/DL (ref 8–23)
BUN/CREAT BLD: ABNORMAL (ref 9–20)
CALCIUM SERPL-MCNC: 8.7 MG/DL (ref 8.6–10.4)
CHLORIDE BLD-SCNC: 98 MMOL/L (ref 98–107)
CO2: 32 MMOL/L (ref 20–31)
CREAT SERPL-MCNC: 0.43 MG/DL (ref 0.7–1.2)
GFR AFRICAN AMERICAN: >60 ML/MIN
GFR NON-AFRICAN AMERICAN: >60 ML/MIN
GFR SERPL CREATININE-BSD FRML MDRD: ABNORMAL ML/MIN/{1.73_M2}
GFR SERPL CREATININE-BSD FRML MDRD: ABNORMAL ML/MIN/{1.73_M2}
GLUCOSE BLD-MCNC: 143 MG/DL (ref 70–99)
POTASSIUM SERPL-SCNC: 3.5 MMOL/L (ref 3.7–5.3)
SODIUM BLD-SCNC: 141 MMOL/L (ref 135–144)

## 2018-04-23 PROCEDURE — P9603 ONE-WAY ALLOW PRORATED MILES: HCPCS

## 2018-04-23 PROCEDURE — 80048 BASIC METABOLIC PNL TOTAL CA: CPT

## 2018-04-23 PROCEDURE — 36415 COLL VENOUS BLD VENIPUNCTURE: CPT

## 2018-04-27 ENCOUNTER — HOSPITAL ENCOUNTER (OUTPATIENT)
Age: 78
Setting detail: SPECIMEN
Discharge: HOME | End: 2018-04-27
Payer: MEDICARE

## 2018-04-27 LAB
ANION GAP SERPL CALCULATED.3IONS-SCNC: 11 MMOL/L (ref 9–17)
BUN BLDV-MCNC: 21 MG/DL (ref 8–23)
BUN/CREAT BLD: ABNORMAL (ref 9–20)
CALCIUM SERPL-MCNC: 8.9 MG/DL (ref 8.6–10.4)
CHLORIDE BLD-SCNC: 101 MMOL/L (ref 98–107)
CO2: 31 MMOL/L (ref 20–31)
CREAT SERPL-MCNC: 0.47 MG/DL (ref 0.7–1.2)
GFR AFRICAN AMERICAN: >60 ML/MIN
GFR NON-AFRICAN AMERICAN: >60 ML/MIN
GFR SERPL CREATININE-BSD FRML MDRD: ABNORMAL ML/MIN/{1.73_M2}
GFR SERPL CREATININE-BSD FRML MDRD: ABNORMAL ML/MIN/{1.73_M2}
GLUCOSE BLD-MCNC: 78 MG/DL (ref 70–99)
HCT VFR BLD CALC: 34 % (ref 40.7–50.3)
HEMOGLOBIN: 10.8 G/DL (ref 13–17)
MCH RBC QN AUTO: 31.9 PG (ref 25.2–33.5)
MCHC RBC AUTO-ENTMCNC: 31.8 G/DL (ref 28.4–34.8)
MCV RBC AUTO: 100.3 FL (ref 82.6–102.9)
NRBC AUTOMATED: 0 PER 100 WBC
PDW BLD-RTO: 14.7 % (ref 11.8–14.4)
PLATELET # BLD: 134 K/UL (ref 138–453)
PMV BLD AUTO: 10.8 FL (ref 8.1–13.5)
POTASSIUM SERPL-SCNC: 3.7 MMOL/L (ref 3.7–5.3)
RBC # BLD: 3.39 M/UL (ref 4.21–5.77)
SODIUM BLD-SCNC: 143 MMOL/L (ref 135–144)
WBC # BLD: 4.9 K/UL (ref 3.5–11.3)

## 2018-04-27 PROCEDURE — P9603 ONE-WAY ALLOW PRORATED MILES: HCPCS

## 2018-04-27 PROCEDURE — 80048 BASIC METABOLIC PNL TOTAL CA: CPT

## 2018-04-27 PROCEDURE — 85027 COMPLETE CBC AUTOMATED: CPT

## 2018-05-07 ENCOUNTER — HOSPITAL ENCOUNTER (OUTPATIENT)
Age: 78
Setting detail: SPECIMEN
Discharge: HOME | End: 2018-05-07
Payer: MEDICARE

## 2018-05-07 LAB — POTASSIUM SERPL-SCNC: 3.8 MMOL/L (ref 3.7–5.3)

## 2018-05-07 PROCEDURE — P9603 ONE-WAY ALLOW PRORATED MILES: HCPCS

## 2018-05-07 PROCEDURE — 36415 COLL VENOUS BLD VENIPUNCTURE: CPT

## 2018-05-07 PROCEDURE — 84132 ASSAY OF SERUM POTASSIUM: CPT

## 2018-05-09 ENCOUNTER — HOSPITAL ENCOUNTER (OUTPATIENT)
Age: 78
Setting detail: SPECIMEN
Discharge: HOME | End: 2018-05-09
Payer: MEDICARE

## 2018-05-09 LAB
ALBUMIN SERPL-MCNC: 3.2 G/DL (ref 3.5–5.2)
ALBUMIN/GLOBULIN RATIO: 1.1 (ref 1–2.5)
ALP BLD-CCNC: 140 U/L (ref 40–129)
ALT SERPL-CCNC: 7 U/L (ref 5–41)
ANION GAP SERPL CALCULATED.3IONS-SCNC: 12 MMOL/L (ref 9–17)
AST SERPL-CCNC: 29 U/L
BILIRUB SERPL-MCNC: 0.6 MG/DL (ref 0.3–1.2)
BUN BLDV-MCNC: 18 MG/DL (ref 8–23)
BUN/CREAT BLD: ABNORMAL (ref 9–20)
CALCIUM SERPL-MCNC: 8.8 MG/DL (ref 8.6–10.4)
CHLORIDE BLD-SCNC: 100 MMOL/L (ref 98–107)
CO2: 30 MMOL/L (ref 20–31)
CREAT SERPL-MCNC: 0.56 MG/DL (ref 0.7–1.2)
GFR AFRICAN AMERICAN: >60 ML/MIN
GFR NON-AFRICAN AMERICAN: >60 ML/MIN
GFR SERPL CREATININE-BSD FRML MDRD: ABNORMAL ML/MIN/{1.73_M2}
GFR SERPL CREATININE-BSD FRML MDRD: ABNORMAL ML/MIN/{1.73_M2}
GLUCOSE BLD-MCNC: 107 MG/DL (ref 70–99)
HCT VFR BLD CALC: 33.4 % (ref 40.7–50.3)
HEMOGLOBIN: 10.4 G/DL (ref 13–17)
MCH RBC QN AUTO: 31.6 PG (ref 25.2–33.5)
MCHC RBC AUTO-ENTMCNC: 31.1 G/DL (ref 28.4–34.8)
MCV RBC AUTO: 101.5 FL (ref 82.6–102.9)
NRBC AUTOMATED: 0 PER 100 WBC
PDW BLD-RTO: 15.1 % (ref 11.8–14.4)
PLATELET # BLD: 141 K/UL (ref 138–453)
PMV BLD AUTO: 10.3 FL (ref 8.1–13.5)
POTASSIUM SERPL-SCNC: 4 MMOL/L (ref 3.7–5.3)
RBC # BLD: 3.29 M/UL (ref 4.21–5.77)
SODIUM BLD-SCNC: 142 MMOL/L (ref 135–144)
TOTAL PROTEIN: 6.2 G/DL (ref 6.4–8.3)
WBC # BLD: 6 K/UL (ref 3.5–11.3)

## 2018-05-09 PROCEDURE — 80053 COMPREHEN METABOLIC PANEL: CPT

## 2018-05-09 PROCEDURE — 85027 COMPLETE CBC AUTOMATED: CPT

## 2018-05-09 PROCEDURE — 36415 COLL VENOUS BLD VENIPUNCTURE: CPT

## 2018-05-09 PROCEDURE — P9603 ONE-WAY ALLOW PRORATED MILES: HCPCS

## 2018-05-14 ENCOUNTER — HOSPITAL ENCOUNTER (OUTPATIENT)
Age: 78
Setting detail: SPECIMEN
Discharge: HOME | End: 2018-05-14
Payer: MEDICARE

## 2018-05-14 LAB
ALBUMIN SERPL-MCNC: 3.4 G/DL (ref 3.5–5.2)
ALBUMIN/GLOBULIN RATIO: ABNORMAL (ref 1–2.5)
ALP BLD-CCNC: 167 U/L (ref 40–129)
ALT SERPL-CCNC: 11 U/L (ref 5–41)
ANION GAP SERPL CALCULATED.3IONS-SCNC: 11 MMOL/L (ref 9–17)
AST SERPL-CCNC: 31 U/L
BILIRUB SERPL-MCNC: 0.55 MG/DL (ref 0.3–1.2)
BUN BLDV-MCNC: 18 MG/DL (ref 8–23)
BUN/CREAT BLD: 41 (ref 9–20)
CALCIUM SERPL-MCNC: 9.1 MG/DL (ref 8.6–10.4)
CHLORIDE BLD-SCNC: 97 MMOL/L (ref 98–107)
CO2: 31 MMOL/L (ref 20–31)
CREAT SERPL-MCNC: 0.44 MG/DL (ref 0.7–1.2)
GFR AFRICAN AMERICAN: >60 ML/MIN
GFR NON-AFRICAN AMERICAN: >60 ML/MIN
GFR SERPL CREATININE-BSD FRML MDRD: ABNORMAL ML/MIN/{1.73_M2}
GFR SERPL CREATININE-BSD FRML MDRD: ABNORMAL ML/MIN/{1.73_M2}
GLUCOSE BLD-MCNC: 148 MG/DL (ref 70–99)
HCT VFR BLD CALC: 33.5 % (ref 41–53)
HEMOGLOBIN: 11.3 G/DL (ref 13.5–17.5)
MCH RBC QN AUTO: 33.1 PG (ref 26–34)
MCHC RBC AUTO-ENTMCNC: 33.8 G/DL (ref 31–37)
MCV RBC AUTO: 98 FL (ref 80–100)
NRBC AUTOMATED: ABNORMAL PER 100 WBC
PDW BLD-RTO: 16.4 % (ref 11.5–14.5)
PLATELET # BLD: 179 K/UL (ref 130–400)
PMV BLD AUTO: 8 FL (ref 6–12)
POTASSIUM SERPL-SCNC: 4.4 MMOL/L (ref 3.7–5.3)
RBC # BLD: 3.42 M/UL (ref 4.5–5.9)
SODIUM BLD-SCNC: 139 MMOL/L (ref 135–144)
TOTAL PROTEIN: 6.6 G/DL (ref 6.4–8.3)
WBC # BLD: 8.1 K/UL (ref 3.5–11)

## 2018-05-14 PROCEDURE — 36415 COLL VENOUS BLD VENIPUNCTURE: CPT

## 2018-05-14 PROCEDURE — P9603 ONE-WAY ALLOW PRORATED MILES: HCPCS

## 2018-05-14 PROCEDURE — 85027 COMPLETE CBC AUTOMATED: CPT

## 2018-05-14 PROCEDURE — 80053 COMPREHEN METABOLIC PANEL: CPT

## 2018-05-15 ENCOUNTER — HOSPITAL ENCOUNTER (OUTPATIENT)
Age: 78
Setting detail: SPECIMEN
Discharge: HOME | End: 2018-05-15
Payer: MEDICARE

## 2018-05-15 LAB
ALBUMIN SERPL-MCNC: 3.1 G/DL (ref 3.5–5.2)
ALBUMIN/GLOBULIN RATIO: 1 (ref 1–2.5)
ALP BLD-CCNC: 148 U/L (ref 40–129)
ALT SERPL-CCNC: 9 U/L (ref 5–41)
ANION GAP SERPL CALCULATED.3IONS-SCNC: 11 MMOL/L (ref 9–17)
AST SERPL-CCNC: 29 U/L
BILIRUB SERPL-MCNC: 0.55 MG/DL (ref 0.3–1.2)
BUN BLDV-MCNC: 15 MG/DL (ref 8–23)
BUN/CREAT BLD: ABNORMAL (ref 9–20)
CALCIUM SERPL-MCNC: 9.1 MG/DL (ref 8.6–10.4)
CHLORIDE BLD-SCNC: 98 MMOL/L (ref 98–107)
CO2: 31 MMOL/L (ref 20–31)
CREAT SERPL-MCNC: 0.39 MG/DL (ref 0.7–1.2)
GFR AFRICAN AMERICAN: >60 ML/MIN
GFR NON-AFRICAN AMERICAN: >60 ML/MIN
GFR SERPL CREATININE-BSD FRML MDRD: ABNORMAL ML/MIN/{1.73_M2}
GFR SERPL CREATININE-BSD FRML MDRD: ABNORMAL ML/MIN/{1.73_M2}
GLUCOSE BLD-MCNC: 105 MG/DL (ref 70–99)
HCT VFR BLD CALC: 34.4 % (ref 40.7–50.3)
HEMOGLOBIN: 10.9 G/DL (ref 13–17)
MAGNESIUM: 1.8 MG/DL (ref 1.6–2.6)
MCH RBC QN AUTO: 32.6 PG (ref 25.2–33.5)
MCHC RBC AUTO-ENTMCNC: 31.7 G/DL (ref 28.4–34.8)
MCV RBC AUTO: 103 FL (ref 82.6–102.9)
NRBC AUTOMATED: 0 PER 100 WBC
PDW BLD-RTO: 15 % (ref 11.8–14.4)
PLATELET # BLD: 138 K/UL (ref 138–453)
PMV BLD AUTO: 10.3 FL (ref 8.1–13.5)
POTASSIUM SERPL-SCNC: 3.9 MMOL/L (ref 3.7–5.3)
RBC # BLD: 3.34 M/UL (ref 4.21–5.77)
SODIUM BLD-SCNC: 140 MMOL/L (ref 135–144)
TOTAL PROTEIN: 6.2 G/DL (ref 6.4–8.3)
WBC # BLD: 5.2 K/UL (ref 3.5–11.3)

## 2018-05-15 PROCEDURE — P9603 ONE-WAY ALLOW PRORATED MILES: HCPCS

## 2018-05-15 PROCEDURE — 36415 COLL VENOUS BLD VENIPUNCTURE: CPT

## 2018-05-15 PROCEDURE — 85027 COMPLETE CBC AUTOMATED: CPT

## 2018-05-15 PROCEDURE — 83735 ASSAY OF MAGNESIUM: CPT

## 2018-05-15 PROCEDURE — 80053 COMPREHEN METABOLIC PANEL: CPT

## 2018-05-21 ENCOUNTER — HOSPITAL ENCOUNTER (OUTPATIENT)
Age: 78
Setting detail: SPECIMEN
Discharge: HOME | End: 2018-05-21
Payer: MEDICARE

## 2018-05-21 LAB
ALBUMIN SERPL-MCNC: 3.2 G/DL (ref 3.5–5.2)
ALBUMIN/GLOBULIN RATIO: 1.2 (ref 1–2.5)
ALP BLD-CCNC: 134 U/L (ref 40–129)
ALT SERPL-CCNC: 7 U/L (ref 5–41)
ANION GAP SERPL CALCULATED.3IONS-SCNC: 11 MMOL/L (ref 9–17)
AST SERPL-CCNC: 30 U/L
BILIRUB SERPL-MCNC: 0.49 MG/DL (ref 0.3–1.2)
BUN BLDV-MCNC: 19 MG/DL (ref 8–23)
BUN/CREAT BLD: ABNORMAL (ref 9–20)
CALCIUM SERPL-MCNC: 8.9 MG/DL (ref 8.6–10.4)
CHLORIDE BLD-SCNC: 101 MMOL/L (ref 98–107)
CO2: 32 MMOL/L (ref 20–31)
CREAT SERPL-MCNC: 0.6 MG/DL (ref 0.7–1.2)
GFR AFRICAN AMERICAN: >60 ML/MIN
GFR NON-AFRICAN AMERICAN: >60 ML/MIN
GFR SERPL CREATININE-BSD FRML MDRD: ABNORMAL ML/MIN/{1.73_M2}
GFR SERPL CREATININE-BSD FRML MDRD: ABNORMAL ML/MIN/{1.73_M2}
GLUCOSE BLD-MCNC: 127 MG/DL (ref 70–99)
HCT VFR BLD CALC: 32.2 % (ref 40.7–50.3)
HEMOGLOBIN: 10.2 G/DL (ref 13–17)
MCH RBC QN AUTO: 32.5 PG (ref 25.2–33.5)
MCHC RBC AUTO-ENTMCNC: 31.7 G/DL (ref 28.4–34.8)
MCV RBC AUTO: 102.5 FL (ref 82.6–102.9)
NRBC AUTOMATED: 0 PER 100 WBC
PDW BLD-RTO: 15.1 % (ref 11.8–14.4)
PLATELET # BLD: 148 K/UL (ref 138–453)
PMV BLD AUTO: 10.6 FL (ref 8.1–13.5)
POTASSIUM SERPL-SCNC: 3.9 MMOL/L (ref 3.7–5.3)
RBC # BLD: 3.14 M/UL (ref 4.21–5.77)
SODIUM BLD-SCNC: 144 MMOL/L (ref 135–144)
TOTAL PROTEIN: 5.9 G/DL (ref 6.4–8.3)
WBC # BLD: 5.7 K/UL (ref 3.5–11.3)

## 2018-05-21 PROCEDURE — 36415 COLL VENOUS BLD VENIPUNCTURE: CPT

## 2018-05-21 PROCEDURE — 80053 COMPREHEN METABOLIC PANEL: CPT

## 2018-05-21 PROCEDURE — 85027 COMPLETE CBC AUTOMATED: CPT

## 2018-05-21 PROCEDURE — P9603 ONE-WAY ALLOW PRORATED MILES: HCPCS

## 2018-05-25 ENCOUNTER — HOSPITAL ENCOUNTER (OUTPATIENT)
Age: 78
Setting detail: SPECIMEN
Discharge: HOME | End: 2018-05-25
Payer: MEDICARE

## 2018-05-25 LAB
ABSOLUTE EOS #: 0.07 K/UL (ref 0–0.44)
ABSOLUTE IMMATURE GRANULOCYTE: 0.03 K/UL (ref 0–0.3)
ABSOLUTE LYMPH #: 1.19 K/UL (ref 1.1–3.7)
ABSOLUTE MONO #: 0.61 K/UL (ref 0.1–1.2)
ANION GAP SERPL CALCULATED.3IONS-SCNC: 13 MMOL/L (ref 9–17)
BASOPHILS # BLD: 0 % (ref 0–2)
BASOPHILS ABSOLUTE: <0.03 K/UL (ref 0–0.2)
BUN BLDV-MCNC: 14 MG/DL (ref 8–23)
BUN/CREAT BLD: ABNORMAL (ref 9–20)
CALCIUM SERPL-MCNC: 9.1 MG/DL (ref 8.6–10.4)
CHLORIDE BLD-SCNC: 100 MMOL/L (ref 98–107)
CO2: 30 MMOL/L (ref 20–31)
CREAT SERPL-MCNC: 0.6 MG/DL (ref 0.7–1.2)
DIFFERENTIAL TYPE: ABNORMAL
EOSINOPHILS RELATIVE PERCENT: 1 % (ref 1–4)
GFR AFRICAN AMERICAN: >60 ML/MIN
GFR NON-AFRICAN AMERICAN: >60 ML/MIN
GFR SERPL CREATININE-BSD FRML MDRD: ABNORMAL ML/MIN/{1.73_M2}
GFR SERPL CREATININE-BSD FRML MDRD: ABNORMAL ML/MIN/{1.73_M2}
GLUCOSE BLD-MCNC: 116 MG/DL (ref 70–99)
HCT VFR BLD CALC: 33.7 % (ref 40.7–50.3)
HEMOGLOBIN: 10.8 G/DL (ref 13–17)
IMMATURE GRANULOCYTES: 1 %
LYMPHOCYTES # BLD: 22 % (ref 24–43)
MCH RBC QN AUTO: 32.1 PG (ref 25.2–33.5)
MCHC RBC AUTO-ENTMCNC: 32 G/DL (ref 28.4–34.8)
MCV RBC AUTO: 100.3 FL (ref 82.6–102.9)
MONOCYTES # BLD: 11 % (ref 3–12)
NRBC AUTOMATED: 0 PER 100 WBC
PDW BLD-RTO: 14.7 % (ref 11.8–14.4)
PLATELET # BLD: 139 K/UL (ref 138–453)
PLATELET ESTIMATE: ABNORMAL
PMV BLD AUTO: 10.7 FL (ref 8.1–13.5)
POTASSIUM SERPL-SCNC: 3.5 MMOL/L (ref 3.7–5.3)
RBC # BLD: 3.36 M/UL (ref 4.21–5.77)
RBC # BLD: ABNORMAL 10*6/UL
SEG NEUTROPHILS: 65 % (ref 36–65)
SEGMENTED NEUTROPHILS ABSOLUTE COUNT: 3.57 K/UL (ref 1.5–8.1)
SODIUM BLD-SCNC: 143 MMOL/L (ref 135–144)
WBC # BLD: 5.5 K/UL (ref 3.5–11.3)
WBC # BLD: ABNORMAL 10*3/UL

## 2018-05-25 PROCEDURE — 85025 COMPLETE CBC W/AUTO DIFF WBC: CPT

## 2018-05-25 PROCEDURE — 36415 COLL VENOUS BLD VENIPUNCTURE: CPT

## 2018-05-25 PROCEDURE — 80048 BASIC METABOLIC PNL TOTAL CA: CPT

## 2018-05-25 PROCEDURE — P9603 ONE-WAY ALLOW PRORATED MILES: HCPCS

## 2018-05-29 ENCOUNTER — HOSPITAL ENCOUNTER (OUTPATIENT)
Age: 78
Setting detail: SPECIMEN
Discharge: HOME | End: 2018-05-29
Payer: MEDICARE

## 2018-05-29 LAB
ABSOLUTE EOS #: 0.04 K/UL (ref 0–0.44)
ABSOLUTE IMMATURE GRANULOCYTE: 0.03 K/UL (ref 0–0.3)
ABSOLUTE LYMPH #: 1.03 K/UL (ref 1.1–3.7)
ABSOLUTE MONO #: 0.69 K/UL (ref 0.1–1.2)
ANION GAP SERPL CALCULATED.3IONS-SCNC: 14 MMOL/L (ref 9–17)
BASOPHILS # BLD: 0 % (ref 0–2)
BASOPHILS ABSOLUTE: <0.03 K/UL (ref 0–0.2)
BUN BLDV-MCNC: 17 MG/DL (ref 8–23)
BUN/CREAT BLD: ABNORMAL (ref 9–20)
CALCIUM SERPL-MCNC: 8.7 MG/DL (ref 8.6–10.4)
CHLORIDE BLD-SCNC: 100 MMOL/L (ref 98–107)
CO2: 28 MMOL/L (ref 20–31)
CREAT SERPL-MCNC: 0.35 MG/DL (ref 0.7–1.2)
DIFFERENTIAL TYPE: ABNORMAL
EOSINOPHILS RELATIVE PERCENT: 1 % (ref 1–4)
GFR AFRICAN AMERICAN: >60 ML/MIN
GFR NON-AFRICAN AMERICAN: >60 ML/MIN
GFR SERPL CREATININE-BSD FRML MDRD: ABNORMAL ML/MIN/{1.73_M2}
GFR SERPL CREATININE-BSD FRML MDRD: ABNORMAL ML/MIN/{1.73_M2}
GLUCOSE BLD-MCNC: 122 MG/DL (ref 70–99)
HCT VFR BLD CALC: 33.5 % (ref 40.7–50.3)
HEMOGLOBIN: 10.7 G/DL (ref 13–17)
IMMATURE GRANULOCYTES: 0 %
LYMPHOCYTES # BLD: 15 % (ref 24–43)
MCH RBC QN AUTO: 33.1 PG (ref 25.2–33.5)
MCHC RBC AUTO-ENTMCNC: 31.9 G/DL (ref 28.4–34.8)
MCV RBC AUTO: 103.7 FL (ref 82.6–102.9)
MONOCYTES # BLD: 10 % (ref 3–12)
NRBC AUTOMATED: 0 PER 100 WBC
PDW BLD-RTO: 15.1 % (ref 11.8–14.4)
PLATELET # BLD: 126 K/UL (ref 138–453)
PLATELET ESTIMATE: ABNORMAL
PMV BLD AUTO: 10.8 FL (ref 8.1–13.5)
POTASSIUM SERPL-SCNC: 3.9 MMOL/L (ref 3.7–5.3)
RBC # BLD: 3.23 M/UL (ref 4.21–5.77)
RBC # BLD: ABNORMAL 10*6/UL
SEG NEUTROPHILS: 74 % (ref 36–65)
SEGMENTED NEUTROPHILS ABSOLUTE COUNT: 5.21 K/UL (ref 1.5–8.1)
SODIUM BLD-SCNC: 142 MMOL/L (ref 135–144)
WBC # BLD: 7 K/UL (ref 3.5–11.3)
WBC # BLD: ABNORMAL 10*3/UL

## 2018-05-29 PROCEDURE — 85025 COMPLETE CBC W/AUTO DIFF WBC: CPT

## 2018-05-29 PROCEDURE — P9603 ONE-WAY ALLOW PRORATED MILES: HCPCS

## 2018-05-29 PROCEDURE — 36415 COLL VENOUS BLD VENIPUNCTURE: CPT

## 2018-05-29 PROCEDURE — 80048 BASIC METABOLIC PNL TOTAL CA: CPT

## 2018-07-13 ENCOUNTER — TELEPHONE (OUTPATIENT)
Dept: ADMINISTRATIVE | Age: 78
End: 2018-07-13

## 2018-07-13 NOTE — TELEPHONE ENCOUNTER
Daughter, Elizabeth Chamorro called, states patient is in [facility] and he is being discharged. She has questions regarding medical equipment and would like to speak to triage. Please contact her @ [phone]. Dtr lives in [place] but is in [facility] for a couple of weeks. She would like to get him taken care of while here.

## 2018-08-03 ENCOUNTER — OFFICE VISIT (OUTPATIENT)
Dept: FAMILY MEDICINE CLINIC | Age: 78
End: 2018-08-03
Payer: MEDICARE

## 2018-08-03 VITALS
HEART RATE: 76 BPM | DIASTOLIC BLOOD PRESSURE: 63 MMHG | TEMPERATURE: 97.9 F | SYSTOLIC BLOOD PRESSURE: 100 MMHG | HEIGHT: 67 IN | BODY MASS INDEX: 25.9 KG/M2 | WEIGHT: 165 LBS

## 2018-08-03 DIAGNOSIS — E11.9 DIABETES MELLITUS TYPE 2, INSULIN DEPENDENT (HCC): Primary | ICD-10-CM

## 2018-08-03 DIAGNOSIS — Z79.4 DIABETES MELLITUS TYPE 2, INSULIN DEPENDENT (HCC): Primary | ICD-10-CM

## 2018-08-03 DIAGNOSIS — E78.2 MIXED HYPERLIPIDEMIA: ICD-10-CM

## 2018-08-03 PROBLEM — D69.6 THROMBOCYTOPENIA (HCC): Status: RESOLVED | Noted: 2018-02-22 | Resolved: 2018-08-03

## 2018-08-03 PROBLEM — A41.9 SEPSIS (HCC): Status: RESOLVED | Noted: 2018-02-21 | Resolved: 2018-08-03

## 2018-08-03 PROCEDURE — 99213 OFFICE O/P EST LOW 20 MIN: CPT | Performed by: FAMILY MEDICINE

## 2018-08-03 RX ORDER — RISPERIDONE 1 MG/1
1 TABLET, FILM COATED ORAL 2 TIMES DAILY
Qty: 180 TABLET | Refills: 2 | Status: SHIPPED | OUTPATIENT
Start: 2018-08-03 | End: 2018-08-13 | Stop reason: SDUPTHER

## 2018-08-03 ASSESSMENT — ENCOUNTER SYMPTOMS
SHORTNESS OF BREATH: 0
BACK PAIN: 0
RECTAL PAIN: 0
ABDOMINAL PAIN: 0

## 2018-08-03 NOTE — PROGRESS NOTES
Subjective:      Patient ID: Agus Pacheco is a [de-identified] y.o. male. With family - in wheelchair    Lives with son-in-law on [address]    Problem:  Generalized weakness  Weight loss due to caloric imbalance  Mixed depression and anxiety  Hx. of psychotic episode - admitted to [facility] in [place]  DM Type 1.5 (improved but not at goal) <A1c-9.0>  Early demetia reported by family (memory issues)  COPD  AAA (under watch)  BPH          Review of Systems   Constitutional: Positive for activity change, appetite change, fatigue and unexpected weight change. Eyes: Negative for visual disturbance. Respiratory: Negative for shortness of breath. Cardiovascular: Negative for chest pain. Gastrointestinal: Negative for abdominal pain and rectal pain. Musculoskeletal: Positive for gait problem. Negative for arthralgias and back pain. Skin: Negative for rash. Hematological: Negative for adenopathy. Psychiatric/Behavioral: Positive for decreased concentration. Negative for agitation and confusion. The patient is not nervous/anxious. Objective:   Physical Exam   Constitutional: He is oriented to person, place, and time. Weakness  Peripheral mm wasting  Greeted and recognized me   HENT:   Head: Normocephalic and atraumatic. Cardiovascular: Normal rate and regular rhythm. Pulmonary/Chest: Effort normal and breath sounds normal.   Abdominal: Soft. Musculoskeletal: Normal range of motion. He exhibits no edema. Neurological: He is alert and oriented to person, place, and time. Skin: Skin is warm and dry. Psychiatric: He has a normal mood and affect. His behavior is normal. Judgment and thought content normal.       Assessment:       Diagnosis Orders   1. Diabetes mellitus type 2, insulin dependent ([facility] [address]     2. Mixed hyperlipidemia             Plan:      See orders.     Cleaned up med list - see current list (prioritys: DM, COPD, nutrition, activity, continence, socialization)  Needs consistent therapy - needs coaching to walk with assistance to build strength (family needs to settle differences and move ahead). No labs at this time  Media has records from [facility] [employer] office visit - 2 w    Family has my phone number - needs to organize a list of concerns and forward to me a few days before next visit.           Summary of care

## 2018-08-03 NOTE — PROGRESS NOTES
Visit Information    Have you changed or started any medications since your last visit including any over-the-counter medicines, vitamins, or herbal medicines? no   Have you stopped taking any of your medications? Is so, why? -  no  Are you having any side effects from any of your medications? - no    Have you seen any other physician or provider since your last visit?  no   Have you had any other diagnostic tests since your last visit?  no   Have you been seen in the emergency room and/or had an admission in a hospital since we last saw you?  no   Have you had your routine dental cleaning in the past 6 months?  no     Do you have an active MyChart account? If no, what is the barrier?   No: declined    Patient Care Team:  Bridget Mayo DO as PCP - General (Family Medicine)  Carmina Austin MD as Consulting Physician (Urology)  Freddy Cisneros DO as Consulting Physician (Pulmonology)  Lucas Riggins RN as     Medical History Review  Past Medical, Family, and Social History reviewed and does not contribute to the patient presenting condition    Health Maintenance   Topic Date Due    Shingles Vaccine (1 of 2 - 2 Dose Series) 03/01/1988    Low dose CT lung screening  11/10/2016    Flu vaccine (1) 09/01/2018    Pneumococcal low/med risk (2 of 2 - PPSV23) 11/20/2018    DTaP/Tdap/Td vaccine (2 - Td) 11/20/2027

## 2018-08-13 ENCOUNTER — OFFICE VISIT (OUTPATIENT)
Dept: FAMILY MEDICINE CLINIC | Age: 78
End: 2018-08-13
Payer: MEDICARE

## 2018-08-13 VITALS — DIASTOLIC BLOOD PRESSURE: 60 MMHG | HEART RATE: 90 BPM | SYSTOLIC BLOOD PRESSURE: 90 MMHG

## 2018-08-13 DIAGNOSIS — R53.1 WEAKNESS: Primary | ICD-10-CM

## 2018-08-13 DIAGNOSIS — N13.8 BPH WITH OBSTRUCTION/LOWER URINARY TRACT SYMPTOMS: ICD-10-CM

## 2018-08-13 DIAGNOSIS — E46 MALNUTRITION, UNSPECIFIED TYPE (HCC): ICD-10-CM

## 2018-08-13 DIAGNOSIS — R26.81 GAIT INSTABILITY: ICD-10-CM

## 2018-08-13 DIAGNOSIS — E13.9 DIABETES 1.5, MANAGED AS TYPE 2 (HCC): ICD-10-CM

## 2018-08-13 DIAGNOSIS — N40.1 BPH WITH OBSTRUCTION/LOWER URINARY TRACT SYMPTOMS: ICD-10-CM

## 2018-08-13 PROCEDURE — 99213 OFFICE O/P EST LOW 20 MIN: CPT | Performed by: FAMILY MEDICINE

## 2018-08-13 RX ORDER — FINASTERIDE 5 MG/1
5 TABLET, FILM COATED ORAL DAILY
Qty: 90 TABLET | Refills: 3 | Status: SHIPPED | OUTPATIENT
Start: 2018-08-13

## 2018-08-13 RX ORDER — RISPERIDONE 1 MG/1
1 TABLET, FILM COATED ORAL 2 TIMES DAILY
Qty: 180 TABLET | Refills: 1 | Status: SHIPPED | OUTPATIENT
Start: 2018-08-13

## 2018-08-13 ASSESSMENT — ENCOUNTER SYMPTOMS
COUGH: 0
ABDOMINAL PAIN: 0

## 2018-08-13 NOTE — PROGRESS NOTES
Subjective:      Patient ID: Milly Guzman is a [de-identified] y.o. male. HPI     Review progress since last visit. Meds reviewed  BP low - (hold metoprolol)  Tired a lot  Weakness persists - lives with daughter and son-in-law. Anahy Ramirez here (other daughter)    Review of Systems   Constitutional: Positive for fatigue. Respiratory: Negative for cough. Cardiovascular: Negative for chest pain. Gastrointestinal: Negative for abdominal pain. Genitourinary: Negative for dysuria. Musculoskeletal: Positive for arthralgias and gait problem. Hematological: Does not bruise/bleed easily. Psychiatric/Behavioral: Positive for dysphoric mood. Objective:   Physical Exam   Constitutional: He appears well-developed and well-nourished. BP low - 90/60   HENT:   Head: Normocephalic and atraumatic. Eyes: Conjunctivae are normal.   Cardiovascular: Normal rate and regular rhythm. Pulmonary/Chest: Effort normal and breath sounds normal.   Vitals reviewed. Assessment:       Diagnosis Orders   1. Weakness  DME Order for Wheel Chair as OP   2. Malnutrition, unspecified type [facility])  DME Order for Wheel Chair as OP   3. Gait instability  DME Order for Wheel Chair as OP   4. Diabetes 1.5, managed as type 2 (HCC)  insulin glargine (LANTUS SOLOSTAR) 100 UNIT/ML injection pen           Plan:      Hold BP medication - metoprolol (daughter agreed). Blood glucose readings -  \"good at home\"  Appetite good - discussing food likes    OK to stop Mevacor (TC was 135 previously) - no gain seen at this time for ongoing lipid management via oral medication. [facility]acy[facility] - [place] preferred    Wheel chair - ordered  Hospital Bed - ordered    Meds renewed    Weigh at home - weekly  PT home - encourage familty to continue this for strengthening and Mobility benefits.         Recheck office visit - 6-8 w             Kip Monterroso,

## 2018-08-13 NOTE — PATIENT INSTRUCTIONS
Thank you for letting us take care of you today. We hope all your questions were addressed. If a question was overlooked or something else comes to mind after you return home, please contact a member of your Care Team listed below. Please make sure you have a routine office visit set up to follow-up on [address]. Your Care Team at [place] [address] is Team #2  Ebony Bro DO (Faculty)  Shona Pineda MD (Resident)  Kelechi Ferguson MD (Resident)  Jose Sheets MD (Resident)  Golden Velazco MD (Resident)  Estella Graham MD (Resident)  RAZ Awad., Indira Landry, [address] ([address])  Olvin Pantoja RN, ([address] [facility])  Eyad Van, Ph.D., (Behavioral Services)  Arya Trimble, [address] [place] [address] (Clinical Pharmacist)     Office phone number: [phone]    If you need to get in right away due to illness, please be advised we have \"Same Day\" appointments available Monday-Friday. Please call us at [phone] option #3 to schedule your \"Same Day\" appointment.

## 2018-08-13 NOTE — PROGRESS NOTES
Visit Information    Have you changed or started any medications since your last visit including any over-the-counter medicines, vitamins, or herbal medicines? no   Have you stopped taking any of your medications? Is so, why? -  no  Are you having any side effects from any of your medications? - no    Have you seen any other physician or provider since your last visit?  no   Have you had any other diagnostic tests since your last visit?  no   Have you been seen in the emergency room and/or had an admission in a hospital since we last saw you?  no   Have you had your routine dental cleaning in the past 6 months?  yes -      Do you have an active MyChart account? If no, what is the barrier?   No:     Patient Care Team:  Елена Young DO as PCP - General (Family Medicine)  Kimi Fritz MD as Consulting Physician (Urology)  Lee Vasquez DO as Consulting Physician (Pulmonology)  Shanel Box RN as     Medical History Review  Past Medical, Family, and Social History reviewed and does contribute to the patient presenting condition    Health Maintenance   Topic Date Due    Shingles Vaccine (1 of 2 - 2 Dose Series) 03/01/1988    Low dose CT lung screening  11/10/2016    Flu vaccine (1) 09/01/2018    Pneumococcal low/med risk (2 of 2 - PPSV23) 11/20/2018    DTaP/Tdap/Td vaccine (2 - Td) 11/20/2027

## 2018-08-24 ENCOUNTER — TELEPHONE (OUTPATIENT)
Dept: FAMILY MEDICINE CLINIC | Age: 78
End: 2018-08-24

## 2018-08-24 DIAGNOSIS — R29.898 WEAKNESS OF BOTH LOWER EXTREMITIES: Primary | ICD-10-CM

## 2018-09-04 ENCOUNTER — HOSPITAL ENCOUNTER (OUTPATIENT)
Dept: PHYSICAL THERAPY | Age: 78
Setting detail: THERAPIES SERIES
Discharge: HOME | End: 2018-09-04
Payer: MEDICARE

## 2018-09-07 ENCOUNTER — TELEPHONE (OUTPATIENT)
Dept: FAMILY MEDICINE CLINIC | Age: 78
End: 2018-09-07

## 2018-09-11 ENCOUNTER — TELEPHONE (OUTPATIENT)
Dept: ADMINISTRATIVE | Age: 78
End: 2018-09-11

## 2018-09-14 ENCOUNTER — HOSPITAL ENCOUNTER (OUTPATIENT)
Age: 78
Setting detail: SPECIMEN
Discharge: HOME | End: 2018-09-14
Payer: MEDICARE

## 2018-09-14 LAB
-: ABNORMAL
AMORPHOUS: ABNORMAL
BACTERIA: ABNORMAL
BILIRUBIN URINE: NEGATIVE
CASTS UA: ABNORMAL /LPF (ref 0–2)
COLOR: YELLOW
COMMENT UA: ABNORMAL
CRYSTALS, UA: ABNORMAL /HPF
EPITHELIAL CELLS UA: ABNORMAL /HPF (ref 0–5)
GLUCOSE URINE: NEGATIVE
KETONES, URINE: NEGATIVE
LEUKOCYTE ESTERASE, URINE: ABNORMAL
MUCUS: ABNORMAL
NITRITE, URINE: POSITIVE
OTHER OBSERVATIONS UA: ABNORMAL
PH UA: 8.5 (ref 5–8)
PROTEIN UA: ABNORMAL
RBC UA: ABNORMAL /HPF (ref 0–2)
RENAL EPITHELIAL, UA: ABNORMAL /HPF
SPECIFIC GRAVITY UA: 1 (ref 1–1.03)
TRICHOMONAS: ABNORMAL
TURBIDITY: ABNORMAL
URINE HGB: ABNORMAL
UROBILINOGEN, URINE: NORMAL
WBC UA: ABNORMAL /HPF (ref 0–5)
YEAST: ABNORMAL

## 2018-09-14 PROCEDURE — 87086 URINE CULTURE/COLONY COUNT: CPT

## 2018-09-14 PROCEDURE — 87186 SC STD MICRODIL/AGAR DIL: CPT

## 2018-09-14 PROCEDURE — 87088 URINE BACTERIA CULTURE: CPT

## 2018-09-14 PROCEDURE — 81001 URINALYSIS AUTO W/SCOPE: CPT

## 2018-09-16 LAB
CULTURE: ABNORMAL
Lab: ABNORMAL
ORGANISM: ABNORMAL
SPECIMEN DESCRIPTION: ABNORMAL
STATUS: ABNORMAL

## 2018-12-10 ENCOUNTER — HOSPITAL ENCOUNTER (OUTPATIENT)
Age: 78
Setting detail: SPECIMEN
Discharge: HOME | End: 2018-12-10
Payer: MEDICARE

## 2018-12-10 LAB
ALBUMIN SERPL-MCNC: 3.2 G/DL (ref 3.5–5.2)
ALBUMIN/GLOBULIN RATIO: 1.1 (ref 1–2.5)
ALP BLD-CCNC: 94 U/L (ref 40–129)
ALT SERPL-CCNC: 20 U/L (ref 5–41)
ANION GAP SERPL CALCULATED.3IONS-SCNC: 14 MMOL/L (ref 9–17)
AST SERPL-CCNC: 21 U/L
BILIRUB SERPL-MCNC: 0.5 MG/DL (ref 0.3–1.2)
BUN BLDV-MCNC: 19 MG/DL (ref 8–23)
BUN/CREAT BLD: ABNORMAL (ref 9–20)
CALCIUM SERPL-MCNC: 8.9 MG/DL (ref 8.6–10.4)
CHLORIDE BLD-SCNC: 104 MMOL/L (ref 98–107)
CO2: 25 MMOL/L (ref 20–31)
CREAT SERPL-MCNC: 0.45 MG/DL (ref 0.7–1.2)
GFR AFRICAN AMERICAN: >60 ML/MIN
GFR NON-AFRICAN AMERICAN: >60 ML/MIN
GFR SERPL CREATININE-BSD FRML MDRD: ABNORMAL ML/MIN/{1.73_M2}
GFR SERPL CREATININE-BSD FRML MDRD: ABNORMAL ML/MIN/{1.73_M2}
GLUCOSE BLD-MCNC: 119 MG/DL (ref 70–99)
HCT VFR BLD CALC: 38.3 % (ref 40.7–50.3)
HEMOGLOBIN: 12.1 G/DL (ref 13–17)
MCH RBC QN AUTO: 31.3 PG (ref 25.2–33.5)
MCHC RBC AUTO-ENTMCNC: 31.6 G/DL (ref 28.4–34.8)
MCV RBC AUTO: 99 FL (ref 82.6–102.9)
NRBC AUTOMATED: 0 PER 100 WBC
PDW BLD-RTO: 12.7 % (ref 11.8–14.4)
PLATELET # BLD: ABNORMAL K/UL (ref 138–453)
PLATELET, FLUORESCENCE: 121 K/UL (ref 138–453)
PLATELET, IMMATURE FRACTION: 4.3 % (ref 1.1–10.3)
PMV BLD AUTO: ABNORMAL FL (ref 8.1–13.5)
POTASSIUM SERPL-SCNC: 3.9 MMOL/L (ref 3.7–5.3)
RBC # BLD: 3.87 M/UL (ref 4.21–5.77)
SODIUM BLD-SCNC: 143 MMOL/L (ref 135–144)
TOTAL PROTEIN: 6.2 G/DL (ref 6.4–8.3)
WBC # BLD: 6.9 K/UL (ref 3.5–11.3)

## 2018-12-10 PROCEDURE — P9603 ONE-WAY ALLOW PRORATED MILES: HCPCS

## 2018-12-10 PROCEDURE — 85027 COMPLETE CBC AUTOMATED: CPT

## 2018-12-10 PROCEDURE — 36415 COLL VENOUS BLD VENIPUNCTURE: CPT

## 2018-12-10 PROCEDURE — 85055 RETICULATED PLATELET ASSAY: CPT

## 2018-12-10 PROCEDURE — 80053 COMPREHEN METABOLIC PANEL: CPT

## 2019-07-18 ENCOUNTER — TELEPHONE (OUTPATIENT)
Dept: FAMILY MEDICINE CLINIC | Age: 79
End: 2019-07-18

## 2019-11-13 ENCOUNTER — TELEPHONE (OUTPATIENT)
Dept: FAMILY MEDICINE CLINIC | Age: 79
End: 2019-11-13

## 2021-05-24 NOTE — PROGRESS NOTES
Adwoa Joshi RN entered room to check bath light. She then came and got writer to inform RN pt had fallen. Writer and Doris Solomon went into pt room. Pt sitting on floor between floor and wall infront of the shower. RN asked pt how he got to the bathroom, pt was unsure. Juan R Uribe, [address] [place] [address] assisted pt to standing position and back to bed utilizing gait belt. Pt denies hitting head or losing consciousness. Vitals taken. Pt a/o x4, very SOB. Tele sitter initiated, bed alarm activated, Dr. Kayla Patel at bedside.      Electronically signed by Orlando Jordan RN on 2/22/2018 at 8:26 AM The Service to allergy order in work queue [phone] requested on 5/12/2021 has been cancelled as unable to contact. Ordering provider has been notified..    Please contact patient if further communication is needed.

## 2021-08-20 NOTE — PROGRESS NOTES
Detail Level: Detailed HISTORY: Reason for exam:->fall Ordering Physician Provided Reason for Exam: fall Acuity: Acute Type of Exam: Initial Mechanism of Injury: Pt states that he got dizzy and fell off toilet today. Left hip discomfort Relevant Medical/Surgical History: Pt states that he got dizzy and fell off toilet today. Left hip discomfort Additional history of hypertension, bladder cancer, gastroesophageal reflux disease, asthma, tobacco abuse, chronic obstructive pulmonary disease and respiratory failure, and multiple drug resistant organisms. FINDINGS: Overlying ECG monitor leads. Mildly enlarged but stable appearing cardiac silhouette. Uncoiled and calcified thoracic aorta, similar by comparison. Mild basilar atelectasis or scarring, best seen right lateral base. No consolidation or sizable pleural effusion. No pneumothorax. No obvious rib fracture. Some loss mid thoracic vertebral body height of indeterminate age. Mild kyphoscoliosis thoracic spine with some osteopenia and DJD. Clips status post cholecystectomy. No acute cardiopulmonary disease. Bibasilar atelectasis or scarring. Some loss mid thoracic vertebral body height, possibly chronic. Correlate clinically. Xr Pelvis (1-2 Views)    Result Date: 2/21/2018  EXAMINATION: SINGLE VIEW OF THE PELVIS 2/21/2018 4:09 pm COMPARISON: CT angiography of the abdomen and pelvis dated 07/19/2011 HISTORY: ORDERING SYSTEM PROVIDED HISTORY: fall TECHNOLOGIST PROVIDED HISTORY: Reason for exam:->fall Ordering Physician Provided Reason for Exam: fall Acuity: Acute Type of Exam: Initial Mechanism of Injury: Pt states that he got dizzy and fell off toilet today. Left hip discomfort Relevant Medical/Surgical History: Pt states that he got dizzy and fell off toilet today. Left hip discomfort FINDINGS: Pelvic bones are intact without evidence of acute fracture or displacement. Proximal femurs appear to be grossly intact.   There is an unchanged nonaggressive chondroid lesion in the Quality 130: Documentation Of Current Medications In The Medical Record: Current Medications Documented Quality 431: Preventive Care And Screening: Unhealthy Alcohol Use - Screening: Patient screened for unhealthy alcohol use using a single question and scores less than 2 times per year Quality 110: Preventive Care And Screening: Influenza Immunization: Influenza immunization was not ordered or administered, reason not given Quality 226: Preventive Care And Screening: Tobacco Use: Screening And Cessation Intervention: Patient screened for tobacco use and is an ex/non-smoker enlargement of the bilateral adrenal glands can be seen with adrenal hyperplasia. Mild bilateral perinephric stranding. Fluid density lesions within both kidneys most likely representing renal cysts, the largest which is partially visualized at the midpole of the right kidney measuring up to 3.3 cm on image 144, series 5. Possible punctate 1 mm nonobstructing calculus on image 148, series 5. 1.3 cm fat containing right adrenal mass on image 111, series 5, consistent with a right adrenal myelolipoma. Soft Tissues/Bones: Mild moderate diffuse degenerative changes throughout the spine. Moderate thoracic dextroscoliosis. 1. No clear evidence for pulmonary embolus within the limitations of this study. 2. Opacity in the right posterolateral trachea which may represent mucoid secretion or aspirated material. 3. Respiratory motion and dependent atelectasis within both lungs. Moderate emphysema. 4. Partially visualized AAA measuring 2.9 x 3.1 cm. Follow-up guidelines provided below. 5. Fatty liver. Prior cholecystectomy. 6. Adrenal hyperplasia. Right adrenal myelolipoma measuring up to 1.3 cm. 7. Multiple probable bilateral renal cysts, some which are partially visualized. 8. Coronary artery disease. Atherosclerotic calcification and atheromatous plaque of the aorta. RECOMMENDATIONS: Managing Abdominal Aortic Aneurysms 2.6-2.9 cm: Every 5 years* 3.0-3.4 cm: Every 3 years. 3.5-3.9 cm: Every 1 year. 4.0-4.4 cm: Every 1 year. Recommend vascular consultation. 4.5-5.4 cm: Every 6 months. Recommend vascular consultation. Greater than or equal to 5.5 cm: Referral to vascular surgeon. *For abdominal aortas with maximum diameter of 2.6-2.9 cm meeting criteria for AAA (>50% of proximal normal segment). Reference: J Vasc Surg.  2009 Oct;50(4 Suppl):S2-49     Vl Venous Duplex Upper Extremity Right    Result Date: 2/27/2018    [facility]  Vascular Upper Extremities Veins Procedure   Patient Name  [facility] Date of Study           02/26/2018                Yasir Ch   Date of Birth 1940  Gender                  Male   Age           76 year(s)  Race                       Room Number   2003        Height:                 7.09 inch, 18 cm   Corporate ID  [id number]  Weight:                 198 pounds, 89.8 kg  #   Patient Acct  [de-identified]   BSA:        0.4 m^2     BMI:       2771.91  #                                                            kg/m^2   MR #          [id number]      Sonographer             Kwasi Mathew   Accession #   [id number]   Interpreting Physician  Zhang Root   Referring                 Referring Physician     Paola Nash  Nurse  Practitioner  Procedure Type of Study:   Veins: Upper Extremities Veins, Venous Scan Upper Right. Indications for Study:R/O DVT. Patient Status: In Patient. Technical Quality:Limited visualization. Limitation reason:IV lines and patient movement. Conclusions   Summary   Simultaneous real time imaging utilizing B-Mode, color doppler and  spectral waveform analysis was performed on the right upper extremity for  venous examination of the deep and superficial systems. Findings are:   Right:  No evidence of deep or superficial venous thrombosis. Signature   ----------------------------------------------------------------  Electronically signed by Kwasi Mathew(Sonographer) on  02/26/2018 11:32 AM  ----------------------------------------------------------------   ----------------------------------------------------------------  Electronically signed by Brendan Stout(Interpreting  physician) on 02/27/2018 01:46 AM  ----------------------------------------------------------------  Findings:   Right Impression:  The internal jugular, subclavian, axillary, brachial, radial, and ulnar  veins demonstrate normal compressibility and doppler signals. Normal compressibility of the cephalic vein. Normal compressibility of the basilic vein.   Velocities are +-------------------------+-----------------------+------------------------+ ! Brachial                 !Phasic                 !                        ! +-------------------------+-----------------------+------------------------+        Physical Examination:        Physical Exam   Constitutional: He is oriented to person, place, and time and well-developed, well-nourished, and in no distress. Vital signs are normal.   HENT:   Head: Normocephalic and atraumatic. Eyes: EOM are normal.   Neck: Normal range of motion. Cardiovascular: Normal rate, regular rhythm, S1 normal and S2 normal.  Exam reveals distant heart sounds. Pulses:       Radial pulses are 2+ on the right side, and 2+ on the left side. Pulmonary/Chest: Effort normal and breath sounds normal.   Abdominal: Soft. Bowel sounds are normal.   Musculoskeletal: Normal range of motion. Neurological: He is oriented to person, place, and time. Skin: Skin is warm and dry.          Assessment:        Primary Problem  Sepsis [facility])    Active Hospital Problems    Diagnosis Date Noted    Bacteremia [R78.81]     Thrombocytopenia ([facility] [D69.6] 02/22/2018    Sepsis ([facility] [A41.9] 02/21/2018    Diabetes mellitus type 2, insulin dependent ([facility] [E11.9, Z79.4] 02/21/2018    Hypertension [I10]     Hyperlipidemia [E78.5]     COPD (chronic obstructive pulmonary disease) ([facility] [J44.9]        Plan:        Acute on chronic hypoxic & hypercapneic respiratory failure, resolving  - Patient extubated, on 4 LPM  - COPD on home O2  - LLB airspace disease with partial clearing     Septicemia, 2/2 presumed pneumonia vs skin lesion source  - Chest radiograph without acute pathology; CT without evidence of PE  - LUIS: No Valvular vegetation, or thrombus, EF 50%  - (-) influenza, Strep pneumo, Legionella, Mycoplasma, MRSA nasal swab  - UA (-) LE, bacteria  - Sputum culture, urine culture pending  - Blood cultures 2/2 MSSA; second set blood cultures 2/2 (+) S aureus; third set

## 2023-09-13 NOTE — PROGRESS NOTES
obstructive pulmonary disease) ([facility] [address] Depression; Diverticulitis; GERD (gastroesophageal reflux disease); Hard of hearing; History of kidney stones; History of nasal obstruction; History of smoking; Hoarseness; Hyperlipidemia; Hypertension; Hypertrophy of nasal turbinates; Irritable bowel; MDRO (multiple drug resistant organisms) resistance; Mild obstructive sleep apnea; On supplemental oxygen therapy; Osteoarthritis; Paralysis of vocal cords; Restless legs syndrome; SOB (shortness of breath); Type II or unspecified type diabetes mellitus without mention of complication, not stated as uncontrolled; and Wears glasses. Social History:   reports that he quit smoking about 2 years ago. His smoking use included Cigarettes. He has a 100.00 pack-year smoking history. He has never used smokeless tobacco. He reports that he does not drink alcohol or use drugs. Family History:   Family History   Problem Relation Age of Onset    Diabetes Mother     Other Other      Testicular rhabomyosarcoma        Vitals:  BP (!) 160/78   Pulse 109   Temp 98.2 °F (36.8 °C) (Oral)   Resp 24   Ht 5' 7\" (1.702 m)   Wt 200 lb 9.9 oz (91 kg)   SpO2 93%   BMI 31.42 kg/m²   Temp (24hrs), Av.3 °F (36.8 °C), Min:98.1 °F (36.7 °C), Max:98.5 °F (36.9 °C)    Recent Labs      18   1652  18   2022  18   0903  18   1127   POCGLU  205*  251*  271*  254*       I/O (24Hr): Intake/Output Summary (Last 24 hours) at 18 1331  Last data filed at 18 7874   Gross per 24 hour   Intake             5038 ml   Output             1100 ml   Net             3938 ml       Labs:    [unfilled]    Lab Results   Component Value Date/Time    SPECIAL  2018 08:45 AM     2CC L WRIST PURPLE ONLY Performed at [facility]: NARAYAN SALDANA [address] [facility].  [address] [phone] 2018 08:45 AM     Lab Results   Component Value Date/Time    CULTURE NO GROWTH 1 DAY 2018 08:45 AM    CULTURE No Physical Exam   Constitutional: He is oriented to person, place, and time. No distress. Overweight gentleman sitting in chair, comfortable in no distress   HENT:   Head: Normocephalic and atraumatic. Eyes: EOM are normal. Right eye exhibits no discharge. Left eye exhibits no discharge. Cardiovascular: Intact distal pulses. Distant heart sounds   Pulmonary/Chest: Effort normal. No respiratory distress. He has no wheezes. Distant without appreciable wheezes   Abdominal: Soft. There is no tenderness (No flank pain, no suprapubic tenderness). There is no rebound and no guarding. Neurological: He is alert and oriented to person, place, and time. Skin: Skin is warm. He is not diaphoretic.        Assessment:        Primary Problem  Sepsis [facility])    Active Hospital Problems    Diagnosis Date Noted    Thrombocytopenia ([facility] [address] [D69.6] 02/22/2018    Sepsis ([facility] [address] [A41.9] 02/21/2018    Diabetes mellitus type 2, insulin dependent ([facility] [address] [E11.9, Z79.4] 02/21/2018    Hypertension [I10]     Hyperlipidemia [E78.5]     COPD (chronic obstructive pulmonary disease) ([facility]) [J44.9]        Plan:        Septicemia, 2/2 pneumonia vs UTI  - Hx of bladder CA with cyst removals, per pt and family hx of UTIs  - Chest radiograph without acute pathology; CT without evidence of PE  - 2D echo mild LV hypertrophy, EF 50-55%, mild pulmonary HTN  - (-) influenza, Strep pneumo, Legionella, MRSA nasal swab  - Pending Mycoplasma  - Blood cultures (+) S aureus x2  - ID consulted, LUIS this am by cardiology  - Lactic acid 4.1 -> 2.4 -> 3.7  - Sputum culture, urine culture pending  - On vancomycin, Levaquin     Acute on chronic hypoxic respiratory failure  - COPD on home O2  - RT treat and eval  - Duonebs  - No respiratory distress with good O2 sat on 3L NC this morning    S/p fall  - Patient lightheaded, per family poor fluid intake  - No LOC, no head injury  - Likely 2/2 orthostatic hypotension, pending orthostatics  - NS bolus,

## (undated) DEVICE — CORD ES L10FT BLU MPLR FT SWCH DISP ACMI

## (undated) DEVICE — DUP USE 128098 CLEANSER EXIDINE CHG 4OZ

## (undated) DEVICE — ELECTRODE PT RET AD L9FT HI MOIST COND ADH HYDRGEL CORDED

## (undated) DEVICE — HYPODERMIC SAFETY NEEDLE: Brand: MAGELLAN

## (undated) DEVICE — ST CHARLES CYSTO PACK: Brand: MEDLINE INDUSTRIES, INC.

## (undated) DEVICE — SOLUTION IV IRRIG WATER 1000ML POUR BRL 2F7114

## (undated) DEVICE — Z INACTIVE USE 2635503 SOLUTION IRRIG 3000ML ST H2O USP UROMATIC PLAS CONT